# Patient Record
Sex: MALE | Race: WHITE | NOT HISPANIC OR LATINO | Employment: OTHER | URBAN - METROPOLITAN AREA
[De-identification: names, ages, dates, MRNs, and addresses within clinical notes are randomized per-mention and may not be internally consistent; named-entity substitution may affect disease eponyms.]

---

## 2017-04-05 ENCOUNTER — TRANSCRIBE ORDERS (OUTPATIENT)
Dept: ADMINISTRATIVE | Facility: HOSPITAL | Age: 62
End: 2017-04-05

## 2017-04-05 DIAGNOSIS — I61.9 BRAIN BLEED (HCC): Primary | ICD-10-CM

## 2017-04-17 ENCOUNTER — HOSPITAL ENCOUNTER (OUTPATIENT)
Dept: MRI IMAGING | Facility: HOSPITAL | Age: 62
Discharge: HOME | End: 2017-04-17
Payer: COMMERCIAL

## 2017-04-17 DIAGNOSIS — I61.9 BRAIN BLEED (HCC): ICD-10-CM

## 2017-04-17 PROCEDURE — 70553 MRI BRAIN STEM W/O & W/DYE: CPT

## 2017-04-17 PROCEDURE — A9585 GADOBUTROL INJECTION: HCPCS | Performed by: RADIOLOGY

## 2017-04-17 RX ADMIN — GADOBUTROL 10 ML: 604.72 INJECTION INTRAVENOUS at 12:32

## 2018-05-16 ENCOUNTER — TELEPHONE (OUTPATIENT)
Dept: UROLOGY | Facility: AMBULATORY SURGERY CENTER | Age: 63
End: 2018-05-16

## 2018-05-16 NOTE — TELEPHONE ENCOUNTER
Complaint/Diagnosis - ELEVATED PSA - ALREADY HAD PROSTATE BIOPSY IN 2017 FROM DR Peter Adhikari - I HAVE RESULTS IN HAND  Insurance- RUDY - PT CALLED ALREADY TO VERIFY WE ARE IN NETWORK  History of Cancer? NO  If yes, what kind? Previous urologist?YES  DR Toan Tong                  Records requested/where? RECORDS RECEIVED  Outside testing/where? PT IS OKAY WITH GOING TO [place] OFFICE AS WELL   - ONLY WANTS TO SEE Alexis Mcmillan

## 2018-06-04 ENCOUNTER — OFFICE VISIT (OUTPATIENT)
Dept: UROLOGY | Facility: HOSPITAL | Age: 63
End: 2018-06-04
Payer: COMMERCIAL

## 2018-06-04 VITALS — HEART RATE: 60 BPM | SYSTOLIC BLOOD PRESSURE: 110 MMHG | DIASTOLIC BLOOD PRESSURE: 84 MMHG

## 2018-06-04 DIAGNOSIS — R97.20 ELEVATED PSA: Primary | ICD-10-CM

## 2018-06-04 PROCEDURE — 99244 OFF/OP CNSLTJ NEW/EST MOD 40: CPT | Performed by: UROLOGY

## 2018-06-04 RX ORDER — CLONIDINE HYDROCHLORIDE 0.1 MG/1
0.1 TABLET ORAL 2 TIMES DAILY
Refills: 2 | COMMUNITY
Start: 2018-04-03

## 2018-06-04 RX ORDER — LORAZEPAM 0.5 MG/1
0.5 TABLET ORAL EVERY 8 HOURS
COMMUNITY

## 2018-06-04 RX ORDER — WARFARIN SODIUM 1 MG/1
5 TABLET ORAL 2 TIMES DAILY
Refills: 1 | COMMUNITY
Start: 2018-05-17 | End: 2020-01-30 | Stop reason: SDUPTHER

## 2018-06-04 RX ORDER — BIOTIN 1 MG
TABLET ORAL
COMMUNITY

## 2018-06-04 RX ORDER — WARFARIN SODIUM 5 MG/1
5 TABLET ORAL DAILY
Refills: 1 | COMMUNITY
Start: 2018-05-21

## 2018-06-04 RX ORDER — FLUOXETINE HYDROCHLORIDE 20 MG/1
CAPSULE ORAL
Refills: 2 | COMMUNITY
Start: 2018-03-15

## 2018-06-04 RX ORDER — LISINOPRIL 20 MG/1
20 TABLET ORAL DAILY
Refills: 1 | COMMUNITY
Start: 2018-05-21

## 2018-06-04 RX ORDER — GLIPIZIDE 2.5 MG/1
10 TABLET, EXTENDED RELEASE ORAL 2 TIMES DAILY
Refills: 3 | COMMUNITY
Start: 2018-05-12 | End: 2020-11-19 | Stop reason: DRUGHIGH

## 2018-06-04 RX ORDER — FENOFIBRATE 54 MG/1
160 TABLET ORAL DAILY
Refills: 1 | COMMUNITY
Start: 2018-05-21

## 2018-06-04 RX ORDER — THIAMINE MONONITRATE (VIT B1) 100 MG
TABLET ORAL
COMMUNITY

## 2018-06-04 RX ORDER — LEVETIRACETAM 1000 MG/1
TABLET ORAL
COMMUNITY
Start: 2018-02-15 | End: 2018-06-27 | Stop reason: SDUPTHER

## 2018-06-04 NOTE — PROGRESS NOTES
Assessment/Plan:    Elevated PSA  We discussed prostate cancer screening and PSA at depth  We discussed that the MDX confirm test he did showed up 47% chance of having Lexx 7 or greater cancer  We discussed options including continue to observe the PSA or repeating a prostate biopsy  After lengthy discussion the patient wishes to observe the PSA  We will repeat it in July  If the PSA is continuing to rise to greater than 10 we will proceed with biopsy  He will need to come off of Coumadin for the biopsy and we will need discuss this further with his primary doctor  Diagnoses and all orders for this visit:    Elevated PSA  -     PSA Total, Diagnostic; Future    Other orders  -     aspirin 81 MG tablet; Take 81 mg by mouth  -     cloNIDine (CATAPRES) 0 1 mg tablet; Take 0 1 mg by mouth 2 (two) times a day  -     fenofibrate (TRICOR) 54 MG tablet; Take 54 mg by mouth daily  -     FLUoxetine (PROzac) 20 mg capsule; TAKE 1 CAPSULE BY ORAL ROUTE EVERY DAY IN THE MORNING  -     glipiZIDE (GLUCOTROL XL) 2 5 mg 24 hr tablet; TAKE 1 TABLET BY ORAL ROUTE EVERY DAY WITH BREAKFAST  -     lisinopril (ZESTRIL) 20 mg tablet; Take 20 mg by mouth daily  -     levETIRAcetam (KEPPRA) 1000 MG tablet; by oral route take 0 5 tablet in am and 1 tablet in pm daily  -     LORazepam (ATIVAN) 0 5 mg tablet; Take 0 5 mg by mouth every 8 (eight) hours  -     metFORMIN (GLUCOPHAGE) 500 mg tablet; Take 500 mg by mouth  -     thiamine (VITAMIN B1) 100 mg tablet; i po daily  -     Cholecalciferol (VITAMIN D3) 1000 units CAPS; i po daily  -     warfarin (COUMADIN) 5 mg tablet; Take 5 mg by mouth daily  -     warfarin (COUMADIN) 1 mg tablet; Take 1 mg by mouth 2 (two) times a day          Total visit time was 60 minutes of which over 50% was spent on counseling  Subjective:     Patient ID: Tania Santiago is a 60 y o  male    24-year-old male presents for evaluation of elevated PSA    The patient has a history of elevated PSA and was previously seeing another urologist who has retired  He underwent prostate biopsy in March of 2016 when his PSA was over 4  This was an 8 core biopsy which was negative for cancer  The patient's PSA continued to climb  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  During aggressive anticoagulation of this clot he did suffer a hemorrhagic stroke  The patient is currently on Coumadin and aspirin  Since his PSA continued to climb he subsequently had a confrimMDX  test done on his previous biopsy from 2016  This demonstrated a 79% chance of prostate cancer on repeat biopsy, 47% chance of Lexx 7 or greater cancer  The patient subsequently underwent a prostate MRI that did not show any significant lesions  His repeat PSA done in April was over 9  The patient denies any new bone pain or gross hematuria  He has no other complaints  The following portions of the patient's history were reviewed and updated as appropriate: allergies, current medications, past family history, past medical history, past social history, past surgical history and problem list     Review of Systems   Constitutional: Negative  HENT: Negative  Eyes: Negative  Respiratory: Negative  Cardiovascular: Negative  Gastrointestinal: Negative  Endocrine: Negative  Genitourinary:        As noted per HPI   Musculoskeletal: Negative  Skin: Negative  Allergic/Immunologic: Negative  Neurological: Negative  Hematological: Negative  Psychiatric/Behavioral: Negative  Urinary Incontinence Screening      Most Recent Value   Urinary Incontinence   Urinary Incontinence? No   Incomplete emptying? No   Urinary frequency? No   Urinary urgency? No   Urinary hesitancy? No   Dysuria (painful difficult urination)? No   Nocturia (waking up to use the bathroom)?  -- [1-2 x]   Straining (having to push to go)?   No   Weak stream?  No   Intermittent stream?  No Post void dribbling? No          Objective:    Physical Exam   Constitutional: He is oriented to person, place, and time  He appears well-developed and well-nourished  Neck: Normal range of motion  Cardiovascular: Intact distal pulses  Pulmonary/Chest: Effort normal    Abdominal: Soft  Bowel sounds are normal  He exhibits no distension and no mass  There is no tenderness  There is no rebound and no guarding  Genitourinary: Rectum normal    Genitourinary Comments: 30 g, smooth, no nodules, nontender   Musculoskeletal: Normal range of motion  Neurological: He is alert and oriented to person, place, and time  Skin: Skin is warm and dry  Psychiatric: He has a normal mood and affect  Vitals reviewed          Results  No results found for: PSA  No results found for: GLUCOSE, CALCIUM, NA, K, CO2, CL, BUN, CREATININE  No results found for: WBC, HGB, HCT, MCV, PLT    No results found for this or any previous visit (from the past 1 hour(s)) ]

## 2018-06-04 NOTE — ASSESSMENT & PLAN NOTE
We discussed prostate cancer screening and PSA at depth  We discussed that the MDX confirm test he did showed up 47% chance of having [place] 7 or greater cancer  We discussed options including continue to observe the PSA or repeating a prostate biopsy  After lengthy discussion the patient wishes to observe the PSA  We will repeat it in July  If the PSA is continuing to rise to greater than 10 we will proceed with biopsy  He will need to come off of Coumadin for the biopsy and we will need discuss this further with his primary doctor

## 2018-06-04 NOTE — LETTER
June 4, 2018     Franchesca Law MD  [facility]  [address]     Patient: Robinson Pompa   YOB: 1955   Date of Visit: 6/4/2018       Dear Dr Dayanara Vasquez: Thank you for referring Robinson Pomap to me for evaluation  Below are my notes for this consultation  If you have questions, please do not hesitate to call me  I look forward to following your patient along with you  Sincerely,        Arturo Wright MD        CC: No Recipients  Arturo Wright MD  6/4/2018 12:34 PM  Sign at close encounter  Assessment/Plan:    No problem-specific Assessment & Plan notes found for this encounter  Diagnoses and all orders for this visit:    Elevated PSA  -     PSA Total, Diagnostic; Future    Other orders  -     aspirin 81 MG tablet; Take 81 mg by mouth  -     cloNIDine (CATAPRES) 0 1 mg tablet; Take 0 1 mg by mouth 2 (two) times a day  -     fenofibrate (TRICOR) 54 MG tablet; Take 54 mg by mouth daily  -     FLUoxetine (PROzac) 20 mg capsule; TAKE 1 CAPSULE BY ORAL ROUTE EVERY DAY IN THE MORNING  -     glipiZIDE (GLUCOTROL XL) 2 5 mg 24 hr tablet; TAKE 1 TABLET BY ORAL ROUTE EVERY DAY WITH BREAKFAST  -     lisinopril (ZESTRIL) 20 mg tablet; Take 20 mg by mouth daily  -     levETIRAcetam (KEPPRA) 1000 MG tablet; by oral route take 0 5 tablet in am and 1 tablet in pm daily  -     LORazepam (ATIVAN) 0 5 mg tablet; Take 0 5 mg by mouth every 8 (eight) hours  -     metFORMIN (GLUCOPHAGE) 500 mg tablet; Take 500 mg by mouth  -     thiamine (VITAMIN B1) 100 mg tablet; i po daily  -     Cholecalciferol (VITAMIN D3) 1000 units CAPS; i po daily  -     warfarin (COUMADIN) 5 mg tablet; Take 5 mg by mouth daily  -     warfarin (COUMADIN) 1 mg tablet; Take 1 mg by mouth 2 (two) times a day          Total visit time was 60 minutes of which over 50% was spent on counseling      Subjective:     Patient ID: Robinson Pompa is a 60 y o  male    59-year-old male presents for evaluation of elevated PSA  The patient has a history of elevated PSA and was previously seeing another urologist who has retired  He underwent prostate biopsy in March of 2016 when his PSA was over 4  This was an 8 core biopsy which was negative for cancer  The patient's PSA continued to climb  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  During aggressive anticoagulation of this clot he did suffer a hemorrhagic stroke  The patient is currently on Coumadin and aspirin  Since his PSA continued to climb he subsequently had a confrimMDX  test done on his previous biopsy from 2016  This demonstrated a 79% chance of prostate cancer on repeat biopsy, 47% chance of Lexx 7 or greater cancer  The patient subsequently underwent a prostate MRI that did not show any significant lesions  His repeat PSA done in April was over 9  The patient denies any new bone pain or gross hematuria  He has no other complaints  The following portions of the patient's history were reviewed and updated as appropriate: allergies, current medications, past family history, past medical history, past social history, past surgical history and problem list     Review of Systems   Constitutional: Negative  HENT: Negative  Eyes: Negative  Respiratory: Negative  Cardiovascular: Negative  Gastrointestinal: Negative  Endocrine: Negative  Genitourinary:        As noted per HPI   Musculoskeletal: Negative  Skin: Negative  Allergic/Immunologic: Negative  Neurological: Negative  Hematological: Negative  Psychiatric/Behavioral: Negative  Urinary Incontinence Screening      Most Recent Value   Urinary Incontinence   Urinary Incontinence? No   Incomplete emptying? No   Urinary frequency? No   Urinary urgency? No   Urinary hesitancy? No   Dysuria (painful difficult urination)?   No   Nocturia (waking up to use the bathroom)?  -- [1-2 x]   Straining (having to push to go)? No   Weak stream?  No   Intermittent stream?  No   Post void dribbling? No          Objective:    Physical Exam   Constitutional: He is oriented to person, place, and time  He appears well-developed and well-nourished  Neck: Normal range of motion  Cardiovascular: Intact distal pulses  Pulmonary/Chest: Effort normal    Abdominal: Soft  Bowel sounds are normal  He exhibits no distension and no mass  There is no tenderness  There is no rebound and no guarding  Genitourinary: Rectum normal    Genitourinary Comments: 30 g, smooth, no nodules, nontender   Musculoskeletal: Normal range of motion  Neurological: He is alert and oriented to person, place, and time  Skin: Skin is warm and dry  Psychiatric: He has a normal mood and affect  Vitals reviewed          Results  No results found for: PSA  No results found for: GLUCOSE, CALCIUM, NA, K, CO2, CL, BUN, CREATININE  No results found for: WBC, HGB, HCT, MCV, PLT    No results found for this or any previous visit (from the past 1 hour(s)) ]

## 2018-06-27 ENCOUNTER — TELEPHONE (OUTPATIENT)
Dept: NEUROLOGY | Facility: CLINIC | Age: 63
End: 2018-06-27

## 2018-06-27 DIAGNOSIS — G40.009 EPILEPSY WITH SEIZURES OF LOCALIZED ONSET (HCC): Primary | ICD-10-CM

## 2018-06-27 RX ORDER — LEVETIRACETAM 1000 MG/1
TABLET ORAL
Qty: 45 TABLET | Refills: 5 | Status: SHIPPED | OUTPATIENT
Start: 2018-06-27 | End: 2018-07-12 | Stop reason: SDUPTHER

## 2018-07-12 ENCOUNTER — OFFICE VISIT (OUTPATIENT)
Dept: NEUROLOGY | Facility: CLINIC | Age: 63
End: 2018-07-12
Payer: COMMERCIAL

## 2018-07-12 VITALS
WEIGHT: 228.3 LBS | SYSTOLIC BLOOD PRESSURE: 100 MMHG | HEART RATE: 71 BPM | HEIGHT: 71 IN | BODY MASS INDEX: 31.96 KG/M2 | DIASTOLIC BLOOD PRESSURE: 70 MMHG | RESPIRATION RATE: 18 BRPM

## 2018-07-12 DIAGNOSIS — G40.009 EPILEPSY WITH SEIZURES OF LOCALIZED ONSET (HCC): ICD-10-CM

## 2018-07-12 DIAGNOSIS — G40.009 LOCALIZATION-RELATED (FOCAL) (PARTIAL) IDIOPATHIC EPILEPSY AND EPILEPTIC SYNDROMES WITH SEIZURES OF LOCALIZED ONSET, NOT INTRACTABLE, WITHOUT STATUS EPILEPTICUS (HCC): Primary | ICD-10-CM

## 2018-07-12 PROBLEM — Z86.79 HISTORY OF CEREBRAL HEMORRHAGE: Status: ACTIVE | Noted: 2018-07-12

## 2018-07-12 PROCEDURE — 99213 OFFICE O/P EST LOW 20 MIN: CPT | Performed by: PSYCHIATRY & NEUROLOGY

## 2018-07-12 RX ORDER — LEVETIRACETAM 1000 MG/1
TABLET ORAL
Qty: 45 TABLET | Refills: 0 | Status: CANCELLED | OUTPATIENT
Start: 2018-07-12

## 2018-07-12 RX ORDER — LEVETIRACETAM 1000 MG/1
TABLET ORAL
Qty: 45 TABLET | Refills: 5 | Status: SHIPPED | OUTPATIENT
Start: 2018-07-12 | End: 2019-01-24 | Stop reason: SDUPTHER

## 2018-07-12 RX ORDER — LEVETIRACETAM 1000 MG/1
TABLET ORAL
Qty: 45 TABLET | Refills: 5 | Status: SHIPPED | OUTPATIENT
Start: 2018-07-12 | End: 2019-08-22 | Stop reason: SDUPTHER

## 2018-07-12 NOTE — LETTER
July 12, 2018     Blanca Del Real MD  [facility]  [address] [place] [address]     Patient: Cullen Hawkins   YOB: 1955   Date of Visit: 7/12/2018       Dear Dr Tavarez Ada: Thank you for referring Cullen Hawkins to me for evaluation  Below are my notes for this consultation  If you have questions, please do not hesitate to call me  I look forward to following your patient along with you  Sincerely,        Luis Cuellar MD        CC: No Recipients  Luis Cuellar MD  7/12/2018  9:59 AM  Sign at close encounter  Patient is here today for a follow up for his stroke    Patient ID: Cullen Hawkins is a 60 y o  male  Assessment/Plan:    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 localized onset, not intractable, without status epilepticus ([facility] )  Continues to do extremely well with no evident recurrences since his last recorded event on July 2, 2017  Patient related a question with regard to the possibility of reducing or discontinuing his AED  I discussed with him and his wife the fact that in my opinion it is too early to consider doing so given his underlying remote substrate (spontaneous right hemisphere hemorrhage in December 2016) and the fact that it is only been 1 year that he has been free of events  Both agree to the appropriateness of continuing   --continue Keppra 500 mg in a m  and a 1000 mg in p m  daily  --he states that he has upcoming blood work to be performed  I have asked that blood work included CBC, CMP and Keppra level  He will follow up in 6 months  Subjective:    HPI  Patient, now 60years of age, presents with his wife  He continues is follow-up here that given his historical seizure disorder, characterized as partial in onset and referable to his past spontaneous right hemisphere intracerebral hemorrhage  That hemorrhage occurred in December of 2016    He has continued for seizure control on Keppra 500 mg in a m  and 1000 mg in p m  daily  He has had no reported further events since that of July 2, 2017  He expresses no symptoms that would suggest any problems with adverse side effects from his Keppra      Past Medical History:   Diagnosis Date    Diabetes ([facility] )     DVT (deep venous thrombosis) ([place] [address] ) 2016    Dyslipidemia     Factor V deficiency ([place] [address] )     High blood pressure     Lobar cerebral hemorrhage (HCC)     Multiple pulmonary emboli (HCC)     Seizures ([place] [address] ) 07/2017    Stroke ([place] [address] ) 12/14/2016     Past Surgical History:   Procedure Laterality Date    IVC FILTER INSERTION      IVC umbrella     Social History     Social History    Marital status: /Civil Union     Spouse name: N/A    Number of children: N/A    Years of education: N/A     Social History Main Topics    Smoking status: Former Smoker     Quit date: 1989    Smokeless tobacco: Never Used    Alcohol use No      Comment: not for 1/1/2 years    Drug use: No    Sexual activity: Not Asked     Other Topics Concern    None     Social History Narrative    None     Family History   Problem Relation Age of Onset    Kidney disease Father     Hypertension Father     Diabetes Father     Hypertension Mother     Hypertension Brother      Allergies   Allergen Reactions    Penicillins Hives     hives, swelling- from youth       Current Outpatient Prescriptions:     aspirin 81 MG tablet, Take 81 mg by mouth, Disp: , Rfl:     Cholecalciferol (VITAMIN D3) 1000 units CAPS, i po daily, Disp: , Rfl:     cloNIDine (CATAPRES) 0 1 mg tablet, Take 0 1 mg by mouth 2 (two) times a day, Disp: , Rfl: 2    fenofibrate (TRICOR) 54 MG tablet, Take 54 mg by mouth daily, Disp: , Rfl: 1    FLUoxetine (PROzac) 20 mg capsule, TAKE 1 CAPSULE BY ORAL ROUTE EVERY DAY IN THE MORNING, Disp: , Rfl: 2    glipiZIDE (GLUCOTROL XL) 2 5 mg 24 hr tablet, TAKE 1 TABLET BY ORAL ROUTE EVERY DAY WITH BREAKFAST, Disp: , Rfl: 3   lisinopril (ZESTRIL) 20 mg tablet, Take 20 mg by mouth daily, Disp: , Rfl: 1    LORazepam (ATIVAN) 0 5 mg tablet, Take 0 5 mg by mouth every 8 (eight) hours, Disp: , Rfl:     metFORMIN (GLUCOPHAGE) 500 mg tablet, Take 500 mg by mouth 2 (two) times a day  , Disp: , Rfl:     thiamine (VITAMIN B1) 100 mg tablet, i po daily, Disp: , Rfl:     warfarin (COUMADIN) 1 mg tablet, Take 1 mg by mouth 2 (two) times a day, Disp: , Rfl: 1    warfarin (COUMADIN) 5 mg tablet, Take 5 mg by mouth daily, Disp: , Rfl: 1    levETIRAcetam (KEPPRA) 1000 MG tablet, By oral route 0 5 tablet in the a m  and 1 tablets in p m  daily, Disp: 45 tablet, Rfl: 5    levETIRAcetam (KEPPRA) 1000 MG tablet, The oral route take 0 5 tablet at a m  and 1 tablet in p m  daily, Disp: 45 tablet, Rfl: 5    Objective:    Blood pressure 100/70, pulse 71, resp  rate 18, height 5' 11" (1 803 m), weight 104 kg (228 lb 4 8 oz)  Physical Exam  Lungs clear to auscultation  Rhythm regular  Neurological Exam  Alert  Pleasantly interactive  Fully oriented  No dysarthria  Unremarkable spontaneous gait  Cranial nerves 2-12 tested and grossly intact except for a facial asymmetry with slight droop 1 right at rest but with slight droop on the left with smile along with his dense confrontational left homonymous hemianopsia, all of which is unchanged from the past   No lateralized extremity weakness with gross motor testing  Muscle stretch reflexes slightly increased on left as compared to right, also unchanged  ROS:    Review of Systems   Constitutional: Positive for fatigue  Negative for appetite change and fever  HENT: Negative  Negative for hearing loss, tinnitus, trouble swallowing and voice change  Eyes: Negative  Negative for photophobia and pain  Respiratory: Negative  Negative for shortness of breath  Cardiovascular: Negative  Negative for palpitations  Gastrointestinal: Negative  Negative for nausea and vomiting     Endocrine: Negative  Negative for cold intolerance and heat intolerance  Genitourinary: Negative  Negative for dysuria, frequency and urgency  Musculoskeletal: Negative  Negative for myalgias and neck pain  Skin: Negative  Negative for rash  Allergic/Immunologic: Negative  Neurological: Negative  Negative for dizziness, tremors, seizures, syncope, facial asymmetry, speech difficulty, weakness, light-headedness, numbness and headaches  Hematological: Negative  Does not bruise/bleed easily  Psychiatric/Behavioral: Negative  Negative for confusion, hallucinations and sleep disturbance  I personally reviewed the ROS that was entered by the medical assistant

## 2018-07-12 NOTE — ASSESSMENT & PLAN NOTE
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

## 2018-07-12 NOTE — PROGRESS NOTES
Patient is here today for a follow up for his stroke    Patient ID: Shirlene Olszewski is a 60 y o  male  Assessment/Plan:    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 localized onset, not intractable, without status epilepticus ([facility] [address] )  Continues to do extremely well with no evident recurrences since his last recorded event on July 2, 2017  Patient related a question with regard to the possibility of reducing or discontinuing his AED  I discussed with him and his wife the fact that in my opinion it is too early to consider doing so given his underlying remote substrate (spontaneous right hemisphere hemorrhage in December 2016) and the fact that it is only been 1 year that he has been free of events  Both agree to the appropriateness of continuing   --continue Keppra 500 mg in a m  and a 1000 mg in p m  daily  --he states that he has upcoming blood work to be performed  I have asked that blood work included CBC, CMP and Keppra level  He will follow up in 6 months  Subjective:    HPI  Patient, now 60years of age, presents with his wife  He continues is follow-up here that given his historical seizure disorder, characterized as partial in onset and referable to his past spontaneous right hemisphere intracerebral hemorrhage  That hemorrhage occurred in December of 2016  He has continued for seizure control on Keppra 500 mg in a m  and 1000 mg in p m  daily  He has had no reported further events since that of July 2, 2017  He expresses no symptoms that would suggest any problems with adverse side effects from his Keppra      Past Medical History:   Diagnosis Date    Diabetes ([facility] [address] )     DVT (deep venous thrombosis) ([facility] [address] ) 2016    Dyslipidemia     Factor V deficiency ([facility] [address] )     High blood pressure     Lobar cerebral hemorrhage (HCC)     Multiple pulmonary emboli (HCC)     Seizures ([facility] [address] ) 07/2017    [facility] 12[facility]14[facility]2016     Past Surgical History:   Procedure Laterality Date    IVC FILTER INSERTION      IVC umbrella     Social History     Social History    Marital status: /Civil Union     Spouse name: N/A    Number of children: N/A    Years of education: N/A     Social History Main Topics    Smoking status: Former Smoker     Quit date: 1989    Smokeless tobacco: Never Used    Alcohol use No      Comment: not for 1/1/2 years    Drug use: No    Sexual activity: Not Asked     Other Topics Concern    None     Social History Narrative    None     Family History   Problem Relation Age of Onset    Kidney disease Father     Hypertension Father     Diabetes Father     Hypertension Mother     Hypertension Brother      Allergies   Allergen Reactions    Penicillins Hives     hives, swelling- from youth       Current Outpatient Prescriptions:     aspirin 81 MG tablet, Take 81 mg by mouth, Disp: , Rfl:     Cholecalciferol (VITAMIN D3) 1000 units CAPS, i po daily, Disp: , Rfl:     cloNIDine (CATAPRES) 0 1 mg tablet, Take 0 1 mg by mouth 2 (two) times a day, Disp: , Rfl: 2    fenofibrate (TRICOR) 54 MG tablet, Take 54 mg by mouth daily, Disp: , Rfl: 1    FLUoxetine (PROzac) 20 mg capsule, TAKE 1 CAPSULE BY ORAL ROUTE EVERY DAY IN THE MORNING, Disp: , Rfl: 2    glipiZIDE (GLUCOTROL XL) 2 5 mg 24 hr tablet, TAKE 1 TABLET BY ORAL ROUTE EVERY DAY WITH BREAKFAST, Disp: , Rfl: 3    lisinopril (ZESTRIL) 20 mg tablet, Take 20 mg by mouth daily, Disp: , Rfl: 1    LORazepam (ATIVAN) 0 5 mg tablet, Take 0 5 mg by mouth every 8 (eight) hours, Disp: , Rfl:     metFORMIN (GLUCOPHAGE) 500 mg tablet, Take 500 mg by mouth 2 (two) times a day  , Disp: , Rfl:     thiamine (VITAMIN B1) 100 mg tablet, i po daily, Disp: , Rfl:     warfarin (COUMADIN) 1 mg tablet, Take 1 mg by mouth 2 (two) times a day, Disp: , Rfl: 1    warfarin (COUMADIN) 5 mg tablet, Take 5 mg by mouth daily, Disp: , Rfl: 1    levETIRAcetam (KEPPRA) 1000 MG tablet, By oral route 0 5 tablet in the a m  and 1 tablets in p m  daily, Disp: 45 tablet, Rfl: 5    levETIRAcetam (KEPPRA) 1000 MG tablet, The oral route take 0 5 tablet at a m  and 1 tablet in p m  daily, Disp: 45 tablet, Rfl: 5    Objective:    Blood pressure 100/70, pulse 71, resp  rate 18, height 5' 11" (1 803 m), weight 104 kg (228 lb 4 8 oz)  Physical Exam  Lungs clear to auscultation  Rhythm regular  Neurological Exam  Alert  Pleasantly interactive  Fully oriented  No dysarthria  Unremarkable spontaneous gait  Cranial nerves 2-12 tested and grossly intact except for a facial asymmetry with slight droop 1 right at rest but with slight droop on the left with smile along with his dense confrontational left homonymous hemianopsia, all of which is unchanged from the past   No lateralized extremity weakness with gross motor testing  Muscle stretch reflexes slightly increased on left as compared to right, also unchanged  ROS:    Review of Systems   Constitutional: Positive for fatigue  Negative for appetite change and fever  HENT: Negative  Negative for hearing loss, tinnitus, trouble swallowing and voice change  Eyes: Negative  Negative for photophobia and pain  Respiratory: Negative  Negative for shortness of breath  Cardiovascular: Negative  Negative for palpitations  Gastrointestinal: Negative  Negative for nausea and vomiting  Endocrine: Negative  Negative for cold intolerance and heat intolerance  Genitourinary: Negative  Negative for dysuria, frequency and urgency  Musculoskeletal: Negative  Negative for myalgias and neck pain  Skin: Negative  Negative for rash  Allergic/Immunologic: Negative  Neurological: Negative  Negative for dizziness, tremors, seizures, syncope, facial asymmetry, speech difficulty, weakness, light-headedness, numbness and headaches  Hematological: Negative  Does not bruise/bleed easily  Psychiatric/Behavioral: Negative    Negative for confusion, hallucinations and sleep disturbance  I personally reviewed the ROS that was entered by the medical assistant

## 2018-07-23 ENCOUNTER — OFFICE VISIT (OUTPATIENT)
Dept: UROLOGY | Facility: HOSPITAL | Age: 63
End: 2018-07-23
Payer: COMMERCIAL

## 2018-07-23 ENCOUNTER — TELEPHONE (OUTPATIENT)
Dept: UROLOGY | Facility: HOSPITAL | Age: 63
End: 2018-07-23

## 2018-07-23 VITALS
HEART RATE: 70 BPM | BODY MASS INDEX: 32.9 KG/M2 | HEIGHT: 71 IN | DIASTOLIC BLOOD PRESSURE: 70 MMHG | SYSTOLIC BLOOD PRESSURE: 120 MMHG | WEIGHT: 235 LBS

## 2018-07-23 DIAGNOSIS — R97.20 ELEVATED PSA: Primary | ICD-10-CM

## 2018-07-23 PROCEDURE — 99213 OFFICE O/P EST LOW 20 MIN: CPT | Performed by: UROLOGY

## 2018-07-23 RX ORDER — CIPROFLOXACIN 500 MG/1
500 TABLET, FILM COATED ORAL 2 TIMES DAILY
Qty: 6 TABLET | Refills: 0 | Status: SHIPPED | OUTPATIENT
Start: 2018-07-23 | End: 2018-07-26

## 2018-07-23 NOTE — TELEPHONE ENCOUNTER
Called and left message at Dr Ingrid Longo office to see if Sandee James could stop coumadin prior to prostate biopsy on 9/6/8 or if he needed bridging orders

## 2018-07-23 NOTE — ASSESSMENT & PLAN NOTE
We reviewed his PSA results  They have climb to over 10  We discussed options including continued observation of the PSA or proceeding with biopsy  The patient wishes to proceed with biopsy and I think this is very reasonable  Risks and benefits were discussed  Will contact his primary doctor regarding his Coumadin  We will schedule this in the near future

## 2018-07-23 NOTE — PROGRESS NOTES
Assessment/Plan:    Elevated PSA  We reviewed his PSA results  They have climb to over 10  We discussed options including continued observation of the PSA or proceeding with biopsy  The patient wishes to proceed with biopsy and I think this is very reasonable  Risks and benefits were discussed  Will contact his primary doctor regarding his Coumadin  We will schedule this in the near future  Diagnoses and all orders for this visit:    Elevated PSA  -     ciprofloxacin (CIPRO) 500 mg tablet; Take 1 tablet (500 mg total) by mouth 2 (two) times a day for 3 days Start medicine the day before planned procedure  -     bisacodyl (DULCOLAX) 5 mg EC tablet; Take 2 tablets (10 mg total) by mouth once for 1 dose Take the day before procedure  Total visit time was 25 minutes of which over 50% was spent on counseling  Subjective:     Patient ID: Maya No is a 60 y o  male    79-year-old male with history of elevated PSA presents for follow-up  He denies any changes to urination or new gross hematuria  He has no new complaints  He is here to review his new his PSA result  The following portions of the patient's history were reviewed and updated as appropriate: allergies, current medications, past family history, past medical history, past social history, past surgical history and problem list     Review of Systems   Constitutional: Negative  HENT: Negative  Eyes: Negative  Respiratory: Negative  Cardiovascular: Negative  Gastrointestinal: Negative  Endocrine: Negative  Genitourinary:        As noted per HPI   Musculoskeletal: Negative  Skin: Negative  Allergic/Immunologic: Negative  Neurological: Negative  Hematological: Negative  Psychiatric/Behavioral: Negative  AUA SYMPTOM SCORE      Most Recent Value   AUA SYMPTOM SCORE   How often have you had a sensation of not emptying your bladder completely after you finished urinating?   1   How often have you had to urinate again less than two hours after you finished urinating? 0   How often have you found you stopped and started again several times when you urinate? 2   How often have you found it difficult to postpone urination? 0   How often have you had a weak urinary stream?  1   How often have you had to push or strain to begin urination? 1   How many times did you most typically get up to urinate from the time you went to bed at night until the time you got up in the morning? 2   Quality of Life: If you were to spend the rest of your life with your urinary condition just the way it is now, how would you feel about that?  1   AUA SYMPTOM SCORE  7          Urinary Incontinence Screening      Most Recent Value   Urinary Incontinence   Urinary Incontinence? No   Incomplete emptying? No   Urinary frequency? No   Urinary urgency? No   Urinary hesitancy? No   Dysuria (painful difficult urination)? No   Nocturia (waking up to use the bathroom)? Yes [2 times]   Straining (having to push to go)? No   Weak stream?  No   Intermittent stream?  No   Post void dribbling? Yes          Objective:    Physical Exam   Constitutional: He is oriented to person, place, and time  He appears well-developed and well-nourished  Neck: Normal range of motion  Cardiovascular: Intact distal pulses  Pulmonary/Chest: Effort normal    Abdominal: Soft  Bowel sounds are normal  He exhibits no distension and no mass  There is no tenderness  There is no rebound and no guarding  Musculoskeletal: Normal range of motion  Neurological: He is alert and oriented to person, place, and time  Skin: Skin is warm and dry  Psychiatric: He has a normal mood and affect  Vitals reviewed          Results  No results found for: PSA  No results found for: GLUCOSE, CALCIUM, NA, K, CO2, CL, BUN, CREATININE  No results found for: WBC, HGB, HCT, MCV, PLT    No results found for this or any previous visit (from the past 1 hour(s)) ]

## 2018-08-24 ENCOUNTER — TELEPHONE (OUTPATIENT)
Dept: UROLOGY | Facility: HOSPITAL | Age: 63
End: 2018-08-24

## 2018-08-24 NOTE — TELEPHONE ENCOUNTER
Jose Villeda called about the lovenox shots that Alysa Calderon needs prior to his prostate biopsy    I explaind a little to her but I told her it would be better to talk with the PCP since they were the ones to prescribe it

## 2018-09-06 ENCOUNTER — PROCEDURE VISIT (OUTPATIENT)
Dept: UROLOGY | Facility: AMBULATORY SURGERY CENTER | Age: 63
End: 2018-09-06
Payer: COMMERCIAL

## 2018-09-06 VITALS
HEART RATE: 84 BPM | SYSTOLIC BLOOD PRESSURE: 140 MMHG | BODY MASS INDEX: 32.93 KG/M2 | HEIGHT: 71 IN | WEIGHT: 235.2 LBS | DIASTOLIC BLOOD PRESSURE: 84 MMHG

## 2018-09-06 DIAGNOSIS — R97.20 ELEVATED PROSTATE SPECIFIC ANTIGEN (PSA): ICD-10-CM

## 2018-09-06 DIAGNOSIS — C61 MALIGNANT NEOPLASM OF PROSTATE (HCC): Primary | ICD-10-CM

## 2018-09-06 DIAGNOSIS — R97.20 ELEVATED PSA: Primary | ICD-10-CM

## 2018-09-06 PROCEDURE — 88344 IMHCHEM/IMCYTCHM EA MLT ANTB: CPT | Performed by: PATHOLOGY

## 2018-09-06 PROCEDURE — 76872 US TRANSRECTAL: CPT | Performed by: UROLOGY

## 2018-09-06 PROCEDURE — 76942 ECHO GUIDE FOR BIOPSY: CPT | Performed by: UROLOGY

## 2018-09-06 PROCEDURE — G0416 PROSTATE BIOPSY, ANY MTHD: HCPCS | Performed by: PATHOLOGY

## 2018-09-06 PROCEDURE — 55700 PR BIOPSY OF PROSTATE,NEEDLE/PUNCH: CPT | Performed by: UROLOGY

## 2018-09-06 NOTE — PROGRESS NOTES
Biopsy prostate     Date/Time 9/6/2018 11:14 AM     Performed by  Jean Marie Polanco     Authorized by Jean Marie Polanco          Office TRUS-guided Prostate Biopsy Procedure Note    Indication    Elevated PSA    Informed consent   The risks, benefits and alternatives to TRUS-guided prostate biopsy were conveyed to the patient prior to performing the procedure  A discussion of the risks of the procedure included, but was not limited to: pain, hematuria, hematochezia, hematospermia, infection, and the possibility of a non-diagnostic biopsy  The patient was given the opportunity to have his questions answered and there was no perceived barrier to education  Prophylaxis   The patient underwent bowel prep and startedCiprofloxacin yesterday and will continue to complete antibiotic course through tomorrow  Local anesthesia  Topical 2% lidocaine jelly was applied liberally to the anus and rectum and allowed to dwell for at least 5 min prior to starting the procedure  After insertion of the TRUS probe, 20 mL of 1% lidocaine solution was injected with ultrasound guidance at the prostatic apex and at the junction of the prostate and seminal vesicles  The anesthetic was allowed to dwell for at least 2 minutes prior to biopsy  Transrectal ultrasonography  The patient was placed in the left lateral decubitus position  After an attentive digital rectal examination, a 7 5 mHz sidefire ultrasound probe was gently inserted into the rectum and biplanar imaging of the prostate was done with the findings noted below  Images were taken of any abnormal findings and also to document prostate size  Ultrasound Findings    The seminal vesicles were normal in size, shape, and echotexture  No mass or cyst was identified  The prostate had heterogeneous echogenicity with no discrete masses  Prostate volume was calculated as 28 cm3      TRUS-guided needle biopsy  Using an 18 gauge biopsy needle and ultrasound guidance, the following biopsies were taken:    1 core(s) from the left lateral base  1 core(s) from the left lateral mid-gland  1 core(s) from the right middle base  1 core(s) from the right lateral base  1 core(s) from the left lateral mid-gland  1 core(s) from the left middle mid-gland  1 core(s) from the right middle mid-gland  1 core(s) from the right lateral mid-gland  1 core(s) from the left lateral apex  1 core(s) from the left middle apex  1 core(s) from the right middle apex  1 core(s) from the right lateral apex    Total number of cores: 12                Complications  There were no procedural complications  Disposition  The patient was dismissed to home after 30 minutes of observation in stable condition  Post-procedure instructions: Today he underwent an uncomplicated transrectal ultrasound-guided biopsy of the prostate, following a periprosthetic nerve block  I reviewed the normal postprocedure a course including bleeding per recutm, hematuria, and hematospermia  I instructed him to complete his course of antibiotics as prescribed  Instructed him to call with fever greater than 101, chills, nausea, vomiting, poorly controlled pain  His followup was scheduled in approximately 2 weeks' time to review the pathology

## 2018-09-07 ENCOUNTER — TELEPHONE (OUTPATIENT)
Dept: UROLOGY | Facility: HOSPITAL | Age: 63
End: 2018-09-07

## 2018-09-07 NOTE — TELEPHONE ENCOUNTER
Patient called stating he was doing well after Prostate BX but asked for a call back  He said he is doing well, no fever or blood in urine    Asked if he could go for walks and told him he was able

## 2018-09-11 ENCOUNTER — TELEPHONE (OUTPATIENT)
Dept: UROLOGY | Facility: AMBULATORY SURGERY CENTER | Age: 63
End: 2018-09-11

## 2018-09-11 NOTE — TELEPHONE ENCOUNTER
Oralee Dress from Pathology called  Patient's results for biopsy are in  He wanted to let Dr Lew Shelby know

## 2018-09-13 DIAGNOSIS — R56.9 PARTIAL SEIZURES (HCC): Primary | ICD-10-CM

## 2018-09-13 RX ORDER — LEVETIRACETAM 1000 MG/1
TABLET ORAL
Qty: 135 TABLET | Refills: 1 | Status: SHIPPED | OUTPATIENT
Start: 2018-09-13 | End: 2018-10-25 | Stop reason: ALTCHOICE

## 2018-09-17 ENCOUNTER — TELEPHONE (OUTPATIENT)
Dept: UROLOGY | Facility: HOSPITAL | Age: 63
End: 2018-09-17

## 2018-09-17 NOTE — TELEPHONE ENCOUNTER
Patient called for results of his prostate biopsy  He has an appt next Monday and doesn't want to wait  Explained that we don't give results over the phone, but I will discuss with Dr Ivett Menendez  Called Mirna Hopkins back after discussing with Kim Cr that is not something that the office does of the phone that results are discussed in person    He said alright will see Dr Ivett Menendez next week

## 2018-09-24 ENCOUNTER — OFFICE VISIT (OUTPATIENT)
Dept: UROLOGY | Facility: HOSPITAL | Age: 63
End: 2018-09-24
Payer: COMMERCIAL

## 2018-09-24 VITALS
SYSTOLIC BLOOD PRESSURE: 145 MMHG | BODY MASS INDEX: 32.48 KG/M2 | WEIGHT: 232 LBS | DIASTOLIC BLOOD PRESSURE: 88 MMHG | HEART RATE: 62 BPM | HEIGHT: 71 IN

## 2018-09-24 DIAGNOSIS — C61 PROSTATE CANCER (HCC): Primary | ICD-10-CM

## 2018-09-24 PROCEDURE — 99215 OFFICE O/P EST HI 40 MIN: CPT | Performed by: UROLOGY

## 2018-09-24 NOTE — ASSESSMENT & PLAN NOTE
The patient was recently discovered to have clinical stage T1c prostate cancer  His PSA was 10 1  His biopsy shows right [place] Score 3+4= 7 disease  Metastatic work up was not done due to low risk  I had a lengthy discussion with him about the implications of clinically localized prostate cancer and the treatment options of observation, hormonal therapy, radiation therapy, radical prostatectomy and ablative therapy  I did the best I could to describe the relative pros and cons of each approach  With regard to surgery, the nature of the operation, the risks of infection, hemorrhage, impotence, incontinence, thromboembolic disease and other surgical complications were reviewed with him in detail  We talked about open and robotic approaches  With regard to radiation, differences between external beam, interstitial therapy and combined forms of radiation were reviewed  I have spent a total time of 40 minutes with the patient taking a history, doing an exam and treatment planning, 30 minutes of this time was spent in discussing his diagnosis of prostate cancer  After our lengthy discussion the following decision was made: We will proceed with molecular testing to see whether his cancer is more consistent with intermediate risk or low risk disease  If he has low risk disease we would opt for active surveillance  If his disease looks more aggressive we will need to further discuss both surgery and radiation  We discussed that he is at higher risk for surgical complication given his medical comorbidities

## 2018-09-24 NOTE — LETTER
September 24, 2018     Gabby Falcon MD  [facility] [address]  [address] [place] [address] Dr    Patient: Jordan De Jesus   YOB: 1955   Date of Visit: 9/24/2018       Dear Dr Sin Salazar: Thank you for referring Jordan De Jesus to me for evaluation  Below are my notes for this consultation  If you have questions, please do not hesitate to call me  I look forward to following your patient along with you  Sincerely,        Aiyana Ahn MD        CC: No Recipients  Aiyana Ahn MD  9/24/2018 11:57 AM  Sign at close encounter  Assessment/Plan:    Prostate cancer [facility])  The patient was recently discovered to have clinical stage T1c prostate cancer  His PSA was 10 1  His biopsy shows right [place] Score 3+4= 7 disease  Metastatic work up was not done due to low risk  I had a lengthy discussion with him about the implications of clinically localized prostate cancer and the treatment options of observation, hormonal therapy, radiation therapy, radical prostatectomy and ablative therapy  I did the best I could to describe the relative pros and cons of each approach  With regard to surgery, the nature of the operation, the risks of infection, hemorrhage, impotence, incontinence, thromboembolic disease and other surgical complications were reviewed with him in detail  We talked about open and robotic approaches  With regard to radiation, differences between external beam, interstitial therapy and combined forms of radiation were reviewed  I have spent a total time of 40 minutes with the patient taking a history, doing an exam and treatment planning, 30 minutes of this time was spent in discussing his diagnosis of prostate cancer  After our lengthy discussion the following decision was made: We will proceed with molecular testing to see whether his cancer is more consistent with intermediate risk or low risk disease    If he has low risk disease we would opt for active surveillance  If his disease looks more aggressive we will need to further discuss both surgery and radiation  We discussed that he is at higher risk for surgical complication given his medical comorbidities  Diagnoses and all orders for this visit:    Prostate cancer ([facility] [address] )          Total visit time was 40 minutes of which over 50% was spent on counseling  Subjective:     Patient ID: Nando Roberson is a 58 y o  male    39-year-old male presents for follow-up after prostate biopsy  He did well after the procedure and has no new complaints  He is here with his wife to discuss the results  The following portions of the patient's history were reviewed and updated as appropriate: allergies, current medications, past family history, past medical history, past social history, past surgical history and problem list     Review of Systems   Constitutional: Negative  HENT: Negative  Eyes: Negative  Respiratory: Negative  Cardiovascular: Negative  Gastrointestinal: Negative  Endocrine: Negative  Genitourinary:        As noted per HPI   Musculoskeletal: Negative  Skin: Negative  Allergic/Immunologic: Negative  Neurological: Negative  Hematological: Negative  Psychiatric/Behavioral: Negative  Objective:    Physical Exam   Constitutional: He is oriented to person, place, and time  He appears well-developed and well-nourished  Neck: Normal range of motion  Cardiovascular: Intact distal pulses  Pulmonary/Chest: Effort normal    Abdominal: Soft  Bowel sounds are normal  He exhibits no distension and no mass  There is no tenderness  There is no rebound and no guarding  Musculoskeletal: Normal range of motion  Neurological: He is alert and oriented to person, place, and time  Skin: Skin is warm and dry  Psychiatric: He has a normal mood and affect  Vitals reviewed          Results  No results found for: PSA  No results found for: GLUCOSE, CALCIUM, NA, K, CO2, CL, BUN, CREATININE  No results found for: WBC, HGB, HCT, MCV, PLT    No results found for this or any previous visit (from the past 1 hour(s)) ]

## 2018-09-24 NOTE — PROGRESS NOTES
Assessment/Plan:    Prostate cancer [facility])  The patient was recently discovered to have clinical stage T1c prostate cancer  His PSA was 10 1  His biopsy shows right [place] Score 3+4= 7 disease  Metastatic work up was not done due to low risk  I had a lengthy discussion with him about the implications of clinically localized prostate cancer and the treatment options of observation, hormonal therapy, radiation therapy, radical prostatectomy and ablative therapy  I did the best I could to describe the relative pros and cons of each approach  With regard to surgery, the nature of the operation, the risks of infection, hemorrhage, impotence, incontinence, thromboembolic disease and other surgical complications were reviewed with him in detail  We talked about open and robotic approaches  With regard to radiation, differences between external beam, interstitial therapy and combined forms of radiation were reviewed  I have spent a total time of 40 minutes with the patient taking a history, doing an exam and treatment planning, 30 minutes of this time was spent in discussing his diagnosis of prostate cancer  After our lengthy discussion the following decision was made: We will proceed with molecular testing to see whether his cancer is more consistent with intermediate risk or low risk disease  If he has low risk disease we would opt for active surveillance  If his disease looks more aggressive we will need to further discuss both surgery and radiation  We discussed that he is at higher risk for surgical complication given his medical comorbidities  Diagnoses and all orders for this visit:    Prostate cancer ([facility] [address] )          Total visit time was 40 minutes of which over 50% was spent on counseling  Subjective:     Patient ID: Cullen Hawkins is a 58 y o  male    69-year-old male presents for follow-up after prostate biopsy  He did well after the procedure and has no new complaints    He is here with his wife to discuss the results  The following portions of the patient's history were reviewed and updated as appropriate: allergies, current medications, past family history, past medical history, past social history, past surgical history and problem list     Review of Systems   Constitutional: Negative  HENT: Negative  Eyes: Negative  Respiratory: Negative  Cardiovascular: Negative  Gastrointestinal: Negative  Endocrine: Negative  Genitourinary:        As noted per HPI   Musculoskeletal: Negative  Skin: Negative  Allergic/Immunologic: Negative  Neurological: Negative  Hematological: Negative  Psychiatric/Behavioral: Negative  Objective:    Physical Exam   Constitutional: He is oriented to person, place, and time  He appears well-developed and well-nourished  Neck: Normal range of motion  Cardiovascular: Intact distal pulses  Pulmonary/Chest: Effort normal    Abdominal: Soft  Bowel sounds are normal  He exhibits no distension and no mass  There is no tenderness  There is no rebound and no guarding  Musculoskeletal: Normal range of motion  Neurological: He is alert and oriented to person, place, and time  Skin: Skin is warm and dry  Psychiatric: He has a normal mood and affect  Vitals reviewed          Results  No results found for: PSA  No results found for: GLUCOSE, CALCIUM, NA, K, CO2, CL, BUN, CREATININE  No results found for: WBC, HGB, HCT, MCV, PLT    No results found for this or any previous visit (from the past 1 hour(s)) ]

## 2018-10-08 LAB — SCAN RESULT: NORMAL

## 2018-10-25 ENCOUNTER — OFFICE VISIT (OUTPATIENT)
Dept: UROLOGY | Facility: HOSPITAL | Age: 63
End: 2018-10-25
Payer: COMMERCIAL

## 2018-10-25 VITALS
DIASTOLIC BLOOD PRESSURE: 70 MMHG | BODY MASS INDEX: 32.9 KG/M2 | WEIGHT: 235 LBS | SYSTOLIC BLOOD PRESSURE: 110 MMHG | HEIGHT: 71 IN

## 2018-10-25 DIAGNOSIS — C61 PROSTATE CA (HCC): Primary | ICD-10-CM

## 2018-10-25 PROCEDURE — 99215 OFFICE O/P EST HI 40 MIN: CPT | Performed by: UROLOGY

## 2018-10-26 NOTE — ASSESSMENT & PLAN NOTE
Had a lengthy discussion with the patient and his family  We reviewed his Prolaris test results  His cancer appears to be slightly less aggressive then intermediate risk disease  His 10 year prostate cancer specific mortality with conservative treatment was 3 4%  This is just out of the 3 2% range recommended for active surveillance  We again discussed options including active surveillance, surgery, and radiation therapy  Risks and benefits of all these options were discussed  We discussed his molecular testing risk was just outside of normal parameters for active surveillance  We discussed this would still be a reasonable option given his comorbidities  We discussed that with surgery he does have a higher risk of complications including DVT and pulmonary embolism given his history of factor 5 Leiden mutation  Finally we discussed that radiation treatments would also be a reasonable option  The patient is really hoping to get CyberKnife treatments  At this point however he would like to continue to observe cancer  We will get a PSA now and he will follow up in 4 months with repeat PSA testing

## 2018-10-26 NOTE — PROGRESS NOTES
Assessment/Plan:    Prostate CA [facility])  Had a lengthy discussion with the patient and his family  We reviewed his Prolaris test results  His cancer appears to be slightly less aggressive then intermediate risk disease  His 10 year prostate cancer specific mortality with conservative treatment was 3 4%  This is just out of the 3 2% range recommended for active surveillance  We again discussed options including active surveillance, surgery, and radiation therapy  Risks and benefits of all these options were discussed  We discussed his molecular testing risk was just outside of normal parameters for active surveillance  We discussed this would still be a reasonable option given his comorbidities  We discussed that with surgery he does have a higher risk of complications including DVT and pulmonary embolism given his history of factor 5 Leiden mutation  Finally we discussed that radiation treatments would also be a reasonable option  The patient is really hoping to get CyberKnife treatments  At this point however he would like to continue to observe cancer  We will get a PSA now and he will follow up in 4 months with repeat PSA testing  Diagnoses and all orders for this visit:    Prostate CA ([facility] )  -     PSA Total, Diagnostic; Future  -     PSA Total, Diagnostic; Future          Total visit time was 40 minutes of which over 50% was spent on counseling  Subjective:     Patient ID: Jeanne Foss is a 58 y o  male    78-year-old male presents for follow-up of intermediate risk prostate cancer  Patient is here with his wife and daughter to further discuss treatment options and review his molecular testing results  He has no new complaints            The following portions of the patient's history were reviewed and updated as appropriate: allergies, current medications, past family history, past medical history, past social history, past surgical history and problem list     Review of Systems Constitutional: Negative  HENT: Negative  Eyes: Negative  Respiratory: Negative  Cardiovascular: Negative  Gastrointestinal: Negative  Endocrine: Negative  Genitourinary:        As noted per HPI   Musculoskeletal: Negative  Skin: Negative  Allergic/Immunologic: Negative  Neurological: Negative  Hematological: Negative  Psychiatric/Behavioral: Negative  Objective:    Physical Exam   Constitutional: He is oriented to person, place, and time  He appears well-developed and well-nourished  Neck: Normal range of motion  Cardiovascular: Intact distal pulses  Pulmonary/Chest: Effort normal    Abdominal: Soft  Bowel sounds are normal  He exhibits no distension and no mass  There is no tenderness  There is no rebound and no guarding  Musculoskeletal: Normal range of motion  Neurological: He is alert and oriented to person, place, and time  Skin: Skin is warm and dry  Psychiatric: He has a normal mood and affect  Vitals reviewed          Results  No results found for: PSA  No results found for: GLUCOSE, CALCIUM, NA, K, CO2, CL, BUN, CREATININE  No results found for: WBC, HGB, HCT, MCV, PLT    No results found for this or any previous visit (from the past 1 hour(s)) ]

## 2018-11-01 ENCOUNTER — TELEPHONE (OUTPATIENT)
Dept: UROLOGY | Facility: AMBULATORY SURGERY CENTER | Age: 63
End: 2018-11-01

## 2018-11-01 NOTE — TELEPHONE ENCOUNTER
Patient called about how many DVP's the doctor has done, explained that we don't keep track, but he has done many  He is just trying to see if he wasn't surgery or radiation

## 2018-11-27 NOTE — TELEPHONE ENCOUNTER
Pt's daughter Alen Galvez asking for a recommendation from Dr Lise Bahena for Radiation/Oncologist   [phone]

## 2018-11-28 NOTE — TELEPHONE ENCOUNTER
Called Shira back and left number for [facility] radiation oncology #[phone] - gave Dr Perez's name and that the practise that she is in are all competent

## 2018-12-28 ENCOUNTER — TELEPHONE (OUTPATIENT)
Dept: UROLOGY | Facility: MEDICAL CENTER | Age: 63
End: 2018-12-28

## 2019-01-24 ENCOUNTER — OFFICE VISIT (OUTPATIENT)
Dept: NEUROLOGY | Facility: CLINIC | Age: 64
End: 2019-01-24
Payer: COMMERCIAL

## 2019-01-24 VITALS
DIASTOLIC BLOOD PRESSURE: 80 MMHG | RESPIRATION RATE: 18 BRPM | HEIGHT: 69 IN | HEART RATE: 74 BPM | BODY MASS INDEX: 34.96 KG/M2 | WEIGHT: 236 LBS | SYSTOLIC BLOOD PRESSURE: 110 MMHG

## 2019-01-24 DIAGNOSIS — G40.009 LOCALIZATION-RELATED (FOCAL) (PARTIAL) IDIOPATHIC EPILEPSY AND EPILEPTIC SYNDROMES WITH SEIZURES OF LOCALIZED ONSET, NOT INTRACTABLE, WITHOUT STATUS EPILEPTICUS (HCC): Primary | ICD-10-CM

## 2019-01-24 PROCEDURE — 99213 OFFICE O/P EST LOW 20 MIN: CPT | Performed by: PSYCHIATRY & NEUROLOGY

## 2019-01-24 NOTE — LETTER
January 24, 2019     Anu Chavira MD  [facility] [address]  [address]    Patient: Carmen Black   YOB: 1955   Date of Visit: 1/24/2019       Dear Dr Jyoti Mariano: Thank you for referring Carmen Black to me for evaluation  Below are my notes for this consultation  If you have questions, please do not hesitate to call me  I look forward to following your patient along with you  Sincerely,        Holley Padgett MD        CC: No Recipients  Holley Padgett MD  1/24/2019  9:32 AM  Sign at close encounter  Patient is here today for his seizures    Patient ID: Carmen Black is a 58 y o  male  Assessment/Plan:    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 localized onset, not intractable, without status epilepticus ([facility] )  With focus established by a right hemisphere intra cerebral hemorrhage occurring in the presence of venous side thrombolysis December 2016  With stable, unchanged residual neurologic findings  Continues to do well with no seizure or seizure-like symptoms reported the since his last visit  Last recorded event was July 2, 2017  Reports no adverse side effects from his levetiracetam   --continue levetiracetam 500 mg in a m  And 1000 mg in p m  daily  --to complete his recent blood work, ALT, AST and levetiracetam level  He will follow up in six months or p r n     Subjective:    HPI  The patient, 58years of age, continues his ongoing care here with his historical seizure disorder, characterized as partial in origin and referable to a right hemisphere focus established in the aftermath of a past intra cerebral hemorrhage occurring in 2016 (December)  He has continued since last seen on levetiracetam 500 mg in a m  And a 1000 mg in p m  daily  He has had no symptoms to suggest any seizure or seizure-like events    He reports no symptoms to suggest any adverse side effects  He did have recent blood work performed and those results were reviewed  CBC with hemoglobin 14 7, hematocrit 43, white count 8 4 platelet count 929  BMP unremarkable except for a creatinine of 1 42 with a calculated GFR at 50  Unfortunately, recently diagnosed with prostate cancer and is now working with the [address] [place] [address] where he is now still tentatively scheduled for CyberKnife        Past Medical History:   Diagnosis Date    Diabetes ([facility] )     DVT (deep venous thrombosis) ([place] [address] ) 2016    Dyslipidemia     Factor V deficiency ([facility] )     Hypertension     Lobar cerebral hemorrhage ([facility] )     Multiple pulmonary emboli ([facility] )     Seizures ([facility] ) 07/2017    Stroke ([place] [address] ) 12/14/2016     Past Surgical History:   Procedure Laterality Date    IVC FILTER INSERTION      IVC umbrella    PROSTATE BIOPSY Bilateral 09/06/2018    Dr Jennifer Paez      Social History     Social History    Marital status: /Civil Union     Spouse name: N/A    Number of children: N/A    Years of education: N/A     Social History Main Topics    Smoking status: Former Smoker     Quit date: 1989    Smokeless tobacco: Never Used    Alcohol use No      Comment: not for 1/1/2 years    Drug use: No    Sexual activity: Not Asked     Other Topics Concern    None     Social History Narrative    None     Family History   Problem Relation Age of Onset    Kidney disease Father     Hypertension Father     Diabetes Father     Heart attack Father     Stroke Father     Hypertension Mother     Heart attack Mother     Hypertension Brother      Allergies   Allergen Reactions    Penicillins Hives     Other reaction(s): Unknown  hives, swelling- from youth  hives, swelling- from youth  hives, swelling- from youth       Current Outpatient Prescriptions:     aspirin 81 MG tablet, Take 81 mg by mouth, Disp: , Rfl:     Cholecalciferol (VITAMIN D3) 1000 units CAPS, i po daily, Disp: , Rfl:     cloNIDine (CATAPRES) 0 1 mg tablet, Take 0 1 mg by mouth 2 (two) times a day, Disp: , Rfl: 2    fenofibrate (TRICOR) 54 MG tablet, Take 54 mg by mouth daily, Disp: , Rfl: 1    FLUoxetine (PROzac) 20 mg capsule, TAKE 1 CAPSULE BY ORAL ROUTE EVERY DAY IN THE MORNING, Disp: , Rfl: 2    glipiZIDE (GLUCOTROL XL) 2 5 mg 24 hr tablet, TAKE 1 TABLET BY ORAL ROUTE EVERY DAY WITH BREAKFAST, Disp: , Rfl: 3    levETIRAcetam (KEPPRA) 1000 MG tablet, By oral route 0 5 tablet in the a m  and 1 tablets in p m  daily, Disp: 45 tablet, Rfl: 5    lisinopril (ZESTRIL) 20 mg tablet, Take 20 mg by mouth daily, Disp: , Rfl: 1    LORazepam (ATIVAN) 0 5 mg tablet, Take 0 5 mg by mouth every 8 (eight) hours, Disp: , Rfl:     metFORMIN (GLUCOPHAGE) 500 mg tablet, Take 500 mg by mouth 2 (two) times a day  , Disp: , Rfl:     thiamine (VITAMIN B1) 100 mg tablet, i po daily, Disp: , Rfl:     warfarin (COUMADIN) 5 mg tablet, Take 5 mg by mouth daily, Disp: , Rfl: 1    warfarin (COUMADIN) 1 mg tablet, Take 1 mg by mouth 2 (two) times a day, Disp: , Rfl: 1    Objective:    Blood pressure 110/80, pulse 74, resp  rate 18, height 5' 9" (1 753 m), weight 107 kg (236 lb)  Physical Exam  Lungs clear to auscultation  Rhythm regular  Neurological Exam  Alert  Pleasantly interactive  Fully oriented  Appeared in good mood  No dysarthria  No obvious symbolic language difficulties in general conversation  Unremarkable spontaneous gait  Cranial nerves 2-12 tested and grossly intact except for a slight right facial droop at rest but with a droop on left with smile and the presence by confrontation of a dense left homonymous hemianopsia, all unchanged  Accurate finger-to-nose bilaterally  Good symmetrical strength throughout the four extremities  Muscle stretch reflexes again slightly increased on the left as compared to right, unchanged from the past     ROS:    Review of Systems   Constitutional: Negative  Negative for appetite change and fever     HENT: Negative  Negative for hearing loss, tinnitus, trouble swallowing and voice change  Eyes: Negative  Negative for photophobia and pain  Respiratory: Negative  Negative for shortness of breath  Cardiovascular: Negative  Negative for palpitations  Gastrointestinal: Negative  Negative for nausea and vomiting  Endocrine: Negative  Negative for cold intolerance and heat intolerance  Genitourinary: Positive for frequency  Negative for dysuria and urgency  Musculoskeletal: Negative  Negative for myalgias and neck pain  Skin: Negative  Negative for rash  Allergic/Immunologic: Negative  Neurological: Negative  Negative for dizziness, tremors, seizures, syncope, facial asymmetry, speech difficulty, weakness, light-headedness, numbness and headaches  Hematological: Bruises/bleeds easily  Psychiatric/Behavioral: Negative for confusion and hallucinations  The patient is nervous/anxious  I personally reviewed the ROS that was entered by the medical assistant  *Please note this document was created using voice recognition software and may contain sound-alike word errors  *

## 2019-01-24 NOTE — PROGRESS NOTES
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 localized onset, not intractable, without status epilepticus ([facility] )  With focus established by a right hemisphere intra cerebral hemorrhage occurring in the presence of venous side thrombolysis December 2016  With stable, unchanged residual neurologic findings  Continues to do well with no seizure or seizure-like symptoms reported the since his last visit  Last recorded event was July 2, 2017  Reports no adverse side effects from his levetiracetam   --continue levetiracetam 500 mg in a m  And 1000 mg in p m  daily  --to complete his recent blood work, ALT, AST and levetiracetam level  He will follow up in six months or p r n     Subjective:    HPI  The patient, 58years of age, continues his ongoing care here with his historical seizure disorder, characterized as partial in origin and referable to a right hemisphere focus established in the aftermath of a past intra cerebral hemorrhage occurring in 2016 (December)  He has continued since last seen on levetiracetam 500 mg in a m  And a 1000 mg in p m  daily  He has had no symptoms to suggest any seizure or seizure-like events  He reports no symptoms to suggest any adverse side effects  He did have recent blood work performed and those results were reviewed  CBC with hemoglobin 14 7, hematocrit 43, white count 8 4 platelet count 211  BMP unremarkable except for a creatinine of 1 42 with a calculated GFR at 50  Unfortunately, recently diagnosed with prostate cancer and is now working with the [address] [place] [address] where he is now still tentatively scheduled for CyberKnife        Past Medical History:   Diagnosis Date    Diabetes ([facility] )     DVT (deep venous thrombosis) ([facility] ) 2016    Dyslipidemia     Factor V deficiency ([facility] )     Hypertension     [facility] hemorrhage ([facility] )     [facility]     Seizures ([facility] ) 07/2017    Stroke ([facility] ) 12/14/2016     Past Surgical History:   Procedure Laterality Date    IVC FILTER INSERTION      IVC umbrella    PROSTATE BIOPSY Bilateral 09/06/2018    Dr Jennifer Paez      Social History     Social History    Marital status: /Civil Union     Spouse name: N/A    Number of children: N/A    Years of education: N/A     Social History Main Topics    Smoking status: Former Smoker     Quit date: 1989    Smokeless tobacco: Never Used    Alcohol use No      Comment: not for 1/1/2 years    Drug use: No    Sexual activity: Not Asked     Other Topics Concern    None     Social History Narrative    None     Family History   Problem Relation Age of Onset    Kidney disease Father     Hypertension Father     Diabetes Father     Heart attack Father     Stroke Father     Hypertension Mother     Heart attack Mother     Hypertension Brother      Allergies   Allergen Reactions    Penicillins Hives     Other reaction(s): Unknown  hives, swelling- from youth  hives, swelling- from youth  hives, swelling- from youth       Current Outpatient Prescriptions:     aspirin 81 MG tablet, Take 81 mg by mouth, Disp: , Rfl:     Cholecalciferol (VITAMIN D3) 1000 units CAPS, i po daily, Disp: , Rfl:     cloNIDine (CATAPRES) 0 1 mg tablet, Take 0 1 mg by mouth 2 (two) times a day, Disp: , Rfl: 2    fenofibrate (TRICOR) 54 MG tablet, Take 54 mg by mouth daily, Disp: , Rfl: 1    FLUoxetine (PROzac) 20 mg capsule, TAKE 1 CAPSULE BY ORAL ROUTE EVERY DAY IN THE MORNING, Disp: , Rfl: 2    glipiZIDE (GLUCOTROL XL) 2 5 mg 24 hr tablet, TAKE 1 TABLET BY ORAL ROUTE EVERY DAY WITH BREAKFAST, Disp: , Rfl: 3    levETIRAcetam (KEPPRA) 1000 MG tablet, By oral route 0 5 tablet in the a m  and 1 tablets in p m  daily, Disp: 45 tablet, Rfl: 5    lisinopril (ZESTRIL) 20 mg tablet, Take 20 mg by mouth daily, Disp: , Rfl: 1    LORazepam (ATIVAN) 0 5 mg tablet, Take 0 5 mg by mouth every 8 (eight) hours, Disp: , Rfl:     metFORMIN (GLUCOPHAGE) 500 mg tablet, Take 500 mg by mouth 2 (two) times a day  , Disp: , Rfl:     thiamine (VITAMIN B1) 100 mg tablet, i po daily, Disp: , Rfl:     warfarin (COUMADIN) 5 mg tablet, Take 5 mg by mouth daily, Disp: , Rfl: 1    warfarin (COUMADIN) 1 mg tablet, Take 1 mg by mouth 2 (two) times a day, Disp: , Rfl: 1    Objective:    Blood pressure 110/80, pulse 74, resp  rate 18, height 5' 9" (1 753 m), weight 107 kg (236 lb)  Physical Exam  Lungs clear to auscultation  Rhythm regular  Neurological Exam  Alert  Pleasantly interactive  Fully oriented  Appeared in good mood  No dysarthria  No obvious symbolic language difficulties in general conversation  Unremarkable spontaneous gait  Cranial nerves 2-12 tested and grossly intact except for a slight right facial droop at rest but with a droop on left with smile and the presence by confrontation of a dense left homonymous hemianopsia, all unchanged  Accurate finger-to-nose bilaterally  Good symmetrical strength throughout the four extremities  Muscle stretch reflexes again slightly increased on the left as compared to right, unchanged from the past     ROS:    Review of Systems   Constitutional: Negative  Negative for appetite change and fever  HENT: Negative  Negative for hearing loss, tinnitus, trouble swallowing and voice change  Eyes: Negative  Negative for photophobia and pain  Respiratory: Negative  Negative for shortness of breath  Cardiovascular: Negative  Negative for palpitations  Gastrointestinal: Negative  Negative for nausea and vomiting  Endocrine: Negative  Negative for cold intolerance and heat intolerance  Genitourinary: Positive for frequency  Negative for dysuria and urgency  Musculoskeletal: Negative  Negative for myalgias and neck pain  Skin: Negative  Negative for rash  Allergic/Immunologic: Negative  Neurological: Negative  Negative for dizziness, tremors, seizures, syncope, facial asymmetry, speech difficulty, weakness, light-headedness, numbness and headaches  Hematological: Bruises/bleeds easily  Psychiatric/Behavioral: Negative for confusion and hallucinations  The patient is nervous/anxious  I personally reviewed the ROS that was entered by the medical assistant  *Please note this document was created using voice recognition software and may contain sound-alike word errors  *

## 2019-01-24 NOTE — ASSESSMENT & PLAN NOTE
With focus established by a right hemisphere intra cerebral hemorrhage occurring in the presence of venous side thrombolysis December 2016  With stable, unchanged residual neurologic findings  Continues to do well with no seizure or seizure-like symptoms reported the since his last visit  Last recorded event was July 2, 2017  Reports no adverse side effects from his levetiracetam   --continue levetiracetam 500 mg in a m  And 1000 mg in p m  daily  --to complete his recent blood work, ALT, AST and levetiracetam level

## 2019-05-08 ENCOUNTER — TELEPHONE (OUTPATIENT)
Dept: NEUROLOGY | Facility: CLINIC | Age: 64
End: 2019-05-08

## 2019-06-25 ENCOUNTER — TELEPHONE (OUTPATIENT)
Dept: NEUROLOGY | Facility: CLINIC | Age: 64
End: 2019-06-25

## 2019-07-25 ENCOUNTER — OFFICE VISIT (OUTPATIENT)
Dept: NEUROLOGY | Facility: CLINIC | Age: 64
End: 2019-07-25
Payer: MEDICARE

## 2019-07-25 VITALS
HEART RATE: 78 BPM | DIASTOLIC BLOOD PRESSURE: 72 MMHG | WEIGHT: 231 LBS | BODY MASS INDEX: 32.34 KG/M2 | RESPIRATION RATE: 16 BRPM | HEIGHT: 71 IN | SYSTOLIC BLOOD PRESSURE: 104 MMHG

## 2019-07-25 DIAGNOSIS — G40.009 LOCALIZATION-RELATED (FOCAL) (PARTIAL) IDIOPATHIC EPILEPSY AND EPILEPTIC SYNDROMES WITH SEIZURES OF LOCALIZED ONSET, NOT INTRACTABLE, WITHOUT STATUS EPILEPTICUS (HCC): Primary | ICD-10-CM

## 2019-07-25 PROCEDURE — 99213 OFFICE O/P EST LOW 20 MIN: CPT | Performed by: PSYCHIATRY & NEUROLOGY

## 2019-07-25 RX ORDER — ATORVASTATIN CALCIUM 10 MG/1
10 TABLET, FILM COATED ORAL DAILY
Refills: 3 | COMMUNITY
Start: 2019-07-22

## 2019-07-25 NOTE — PROGRESS NOTES
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 localized onset, not intractable, without status epilepticus ([facility] )  Again, focus established by 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  Continues to do well with no seizure or seizure-like symptoms reported  Last recorded event July 2, 2017  Continues on levetiracetam 500 milligrams in the morning and a 1000 milligrams in the evening daily and reports no adverse side effects  Stable neurologic examination with unchanged residual neurologic findings  --continue levetiracetam 1000 milligram tablets taking half tablet in the morning and a full tablet in the evening daily  --with his modestly reduced calculated GFR, follow-up levetiracetam level  He will follow up in 6 months or p r n     Subjective:    HPI  Patient, 58years of age, is followed for his historical seizure disorder, characterized as partial in origin and referable to a right hemisphere focus established in the aftermath of a past intra cerebral hemorrhage occurring in December 2016 (in conjunction with venous side thrombolysis)  Today was again accompanied by his wife  He has continued on his levetiracetam 500 milligrams in t last recorded event July 2, 2017  he morning and a 1000 milligrams in the evening daily  He has had no seizure or seizure-like symptoms since his last appointment  He reports no symptoms that would suggest any adverse side effects from the levetiracetam   Last recorded event July 2, 2017  Blood work from this past April reviewed  CBC with hemoglobin 13 3, hematocrit 38 6, white count 5 2 and platelet count 991  CMP with creatinine 1 35 and a modestly compromised calculated GFR 55       Past Medical History:   Diagnosis Date    Diabetes ([facility] )     DVT (deep venous thrombosis) ([facility] ) 2016    Dyslipidemia     Factor V deficiency ([facility] )     Hypertension     Lobar cerebral hemorrhage ([facility] )     Multiple pulmonary emboli (HCC)     Seizures ([facility] ) 2017    Stroke ([facility] ) 2016     Past Surgical History:   Procedure Laterality Date    IVC FILTER INSERTION      IVC umbrella    PROSTATE BIOPSY Bilateral 2018    Dr Lacie Hale      Social History     Socioeconomic History    Marital status: /Civil Union     Spouse name: None    Number of children: None    Years of education: None    Highest education level: None   Occupational History    None   Social Needs    Financial resource strain: None    Food insecurity:     Worry: None     Inability: None    Transportation needs:     Medical: None     Non-medical: None   Tobacco Use    Smoking status: Former Smoker     Last attempt to quit:      Years since quittin 5    Smokeless tobacco: Never Used   Substance and Sexual Activity    Alcohol use: No     Comment: not for 1/1/2 years    Drug use: No    Sexual activity: None   Lifestyle    Physical activity:     Days per week: None     Minutes per session: None    Stress: None   Relationships    Social connections:     Talks on phone: None     Gets together: None     Attends Adventism service: None     Active member of club or organization: None     Attends meetings of clubs or organizations: None     Relationship status: None    Intimate partner violence:     Fear of current or ex partner: None     Emotionally abused: None     Physically abused: None     Forced sexual activity: None   Other Topics Concern    None   Social History Narrative    None     Family History   Problem Relation Age of Onset    Kidney disease Father     Hypertension Father     Diabetes Father     Heart attack Father     Stroke Father     Hypertension Mother     Heart attack Mother     Hypertension Brother      Allergies   Allergen Reactions    Penicillins Hives     Other reaction(s): Unknown  hives, swelling- from youth  hives, swelling- from youth  hives, swelling- from youth       Current Outpatient Medications:     aspirin 81 MG tablet, Take 81 mg by mouth, Disp: , Rfl:     Cholecalciferol (VITAMIN D3) 1000 units CAPS, i po daily, Disp: , Rfl:     cloNIDine (CATAPRES) 0 1 mg tablet, Take 0 1 mg by mouth 2 (two) times a day, Disp: , Rfl: 2    fenofibrate (TRICOR) 54 MG tablet, Take 160 mg by mouth daily , Disp: , Rfl: 1    FLUoxetine (PROzac) 20 mg capsule, TAKE 1 CAPSULE BY ORAL ROUTE EVERY DAY IN THE MORNING, Disp: , Rfl: 2    glipiZIDE (GLUCOTROL XL) 2 5 mg 24 hr tablet, 10 mg , Disp: , Rfl: 3    levETIRAcetam (KEPPRA) 1000 MG tablet, By oral route 0 5 tablet in the a m  and 1 tablets in p m  daily, Disp: 45 tablet, Rfl: 5    lisinopril (ZESTRIL) 20 mg tablet, Take 20 mg by mouth daily, Disp: , Rfl: 1    LORazepam (ATIVAN) 0 5 mg tablet, Take 0 5 mg by mouth every 8 (eight) hours, Disp: , Rfl:     metFORMIN (GLUCOPHAGE) 500 mg tablet, Take 500 mg by mouth 2 (two) times a day  , Disp: , Rfl:     thiamine (VITAMIN B1) 100 mg tablet, i po daily, Disp: , Rfl:     warfarin (COUMADIN) 5 mg tablet, Take 5 mg by mouth daily Everyday, Disp: , Rfl: 1    atorvastatin (LIPITOR) 10 mg tablet, Take 10 mg by mouth daily, Disp: , Rfl: 3    warfarin (COUMADIN) 1 mg tablet, Take 5 mg by mouth 2 (two) times a day , Disp: , Rfl: 1    Objective:    Blood pressure 104/72, pulse 78, resp  rate 16, height 5' 11" (1 803 m), weight 105 kg (231 lb)  Physical Exam  Head normocephalic  Eyes nonicteric  No audible anterior neck bruits  Lungs clear to auscultation  Rhythm regular  GI (abdomen) soft nontender  Bowel sounds present  No significant lower extremity edema  Neurological Exam  Alert  Pleasantly interactive  Fully oriented  Gait independent    Cranial nerves 2-12 tested and grossly intact except for the following:  Slight right facial asymmetry at rest but with droop on left with smile; by confrontation a persistent dense left homonymous hemianopsia  Both findings unchanged from the past   Funduscopic examination with bilaterally marginated discs  Good symmetrical strength throughout the 4 extremities  Muscle stretch reflexes slightly increased on the left as compared to right, unchanged from prior examinations  ROS:    Review of Systems   Constitutional: Negative  Negative for appetite change and fever  HENT: Negative  Negative for hearing loss, tinnitus, trouble swallowing and voice change  Eyes: Negative  Negative for photophobia and pain  Respiratory: Negative  Negative for shortness of breath  Cardiovascular: Negative  Negative for palpitations  Gastrointestinal: Negative  Negative for nausea and vomiting  Endocrine: Negative  Negative for cold intolerance and heat intolerance  Genitourinary: Negative  Negative for dysuria, frequency and urgency  Musculoskeletal: Negative  Negative for myalgias and neck pain  Skin: Negative  Negative for rash  Allergic/Immunologic: Negative  Neurological: Positive for light-headedness  Negative for dizziness, tremors, seizures, syncope, facial asymmetry, speech difficulty, weakness, numbness and headaches  Hematological: Negative  Does not bruise/bleed easily  Psychiatric/Behavioral: Negative  Negative for confusion, hallucinations and sleep disturbance  I personally reviewed the ROS that was entered by the medical assistant  *Please note this document was created using voice recognition software and may contain sound-alike word errors  *

## 2019-07-25 NOTE — LETTER
July 25, 2019     Amira Borrero MD  [facility] [address]  [address] [place] [address]    Patient: Harsha Avila   YOB: 1955   Date of Visit: 7/25/2019       Dear Dr Ayden Hernandez: Thank you for referring Harsha Avila to me for evaluation  Below are my notes for this consultation  If you have questions, please do not hesitate to call me  I look forward to following your patient along with you  Sincerely,        Thom Ayala MD        CC: No Recipients  Thom Ayala MD  7/25/2019  1:05 PM  Sign at close encounter  Patient ID: Harsha Avila is a 58 y o  male  Assessment/Plan:    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 localized onset, not intractable, without status epilepticus ([facility] )  Again, focus established by 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  Continues to do well with no seizure or seizure-like symptoms reported  Last recorded event July 2, 2017  Continues on levetiracetam 500 milligrams in the morning and a 1000 milligrams in the evening daily and reports no adverse side effects  Stable neurologic examination with unchanged residual neurologic findings  --continue levetiracetam 1000 milligram tablets taking half tablet in the morning and a full tablet in the evening daily  --with his modestly reduced calculated GFR, follow-up levetiracetam level  He will follow up in 6 months or p r n     Subjective:    HPI  Patient, 58years of age, is followed for his historical seizure disorder, characterized as partial in origin and referable to a right hemisphere focus established in the aftermath of a past intra cerebral hemorrhage occurring in December 2016 (in conjunction with venous side thrombolysis)  Today was again accompanied by his wife    He has continued on his levetiracetam 500 milligrams in t last recorded event 2017  he morning and a 1000 milligrams in the evening daily  He has had no seizure or seizure-like symptoms since his last appointment  He reports no symptoms that would suggest any adverse side effects from the levetiracetam   Last recorded event 2017  Blood work from this past April reviewed  CBC with hemoglobin 13 3, hematocrit 38 6, white count 5 2 and platelet count 385  CMP with creatinine 1 35 and a modestly compromised calculated GFR 55       Past Medical History:   Diagnosis Date    Diabetes ([place] [address] )     DVT (deep venous thrombosis) ([place] [address] )     Dyslipidemia     Factor V deficiency ([place] [address] )     Hypertension     Lobar cerebral hemorrhage ([place] [address] )     Multiple pulmonary emboli (HCC)     Seizures ([place] [address] ) 2017    Stroke ([place] [address] ) 2016     Past Surgical History:   Procedure Laterality Date    IVC FILTER INSERTION      IVC umbrella    PROSTATE BIOPSY Bilateral 2018    Dr Allison S[place]      Social History     Socioeconomic History    Marital status: /Civil Union     Spouse name: None    Number of children: None    Years of education: None    Highest education level: None   Occupational History    None   Social Needs    Financial resource strain: None    Food insecurity:     Worry: None     Inability: None    Transportation needs:     Medical: None     Non-medical: None   Tobacco Use    Smoking status: Former Smoker     Last attempt to quit:      Years since quittin 5    Smokeless tobacco: Never Used   Substance and Sexual Activity    Alcohol use: No     Comment: not for 1 years    Drug use: No    Sexual activity: None   Lifestyle    Physical activity:     Days per week: None     Minutes per session: None    Stress: None   Relationships    Social connections:     Talks on phone: None     Gets together: None     Attends Zoroastrian service: None     Active member of club or organization: None     Attends meetings of clubs or organizations: None Relationship status: None    Intimate partner violence:     Fear of current or ex partner: None     Emotionally abused: None     Physically abused: None     Forced sexual activity: None   Other Topics Concern    None   Social History Narrative    None     Family History   Problem Relation Age of Onset    Kidney disease Father     Hypertension Father     Diabetes Father     Heart attack Father     Stroke Father     Hypertension Mother     Heart attack Mother     Hypertension Brother      Allergies   Allergen Reactions    Penicillins Hives     Other reaction(s): Unknown  hives, swelling- from youth  hives, swelling- from youth  hives, swelling- from youth       Current Outpatient Medications:     aspirin 81 MG tablet, Take 81 mg by mouth, Disp: , Rfl:     Cholecalciferol (VITAMIN D3) 1000 units CAPS, i po daily, Disp: , Rfl:     cloNIDine (CATAPRES) 0 1 mg tablet, Take 0 1 mg by mouth 2 (two) times a day, Disp: , Rfl: 2    fenofibrate (TRICOR) 54 MG tablet, Take 160 mg by mouth daily , Disp: , Rfl: 1    FLUoxetine (PROzac) 20 mg capsule, TAKE 1 CAPSULE BY ORAL ROUTE EVERY DAY IN THE MORNING, Disp: , Rfl: 2    glipiZIDE (GLUCOTROL XL) 2 5 mg 24 hr tablet, 10 mg , Disp: , Rfl: 3    levETIRAcetam (KEPPRA) 1000 MG tablet, By oral route 0 5 tablet in the a m  and 1 tablets in p m  daily, Disp: 45 tablet, Rfl: 5    lisinopril (ZESTRIL) 20 mg tablet, Take 20 mg by mouth daily, Disp: , Rfl: 1    LORazepam (ATIVAN) 0 5 mg tablet, Take 0 5 mg by mouth every 8 (eight) hours, Disp: , Rfl:     metFORMIN (GLUCOPHAGE) 500 mg tablet, Take 500 mg by mouth 2 (two) times a day  , Disp: , Rfl:     thiamine (VITAMIN B1) 100 mg tablet, i po daily, Disp: , Rfl:     warfarin (COUMADIN) 5 mg tablet, Take 5 mg by mouth daily Everyday, Disp: , Rfl: 1    atorvastatin (LIPITOR) 10 mg tablet, Take 10 mg by mouth daily, Disp: , Rfl: 3    warfarin (COUMADIN) 1 mg tablet, Take 5 mg by mouth 2 (two) times a day , Disp: , Rfl: 1    Objective:    Blood pressure 104/72, pulse 78, resp  rate 16, height 5' 11" (1 803 m), weight 105 kg (231 lb)  Physical Exam  Head normocephalic  Eyes nonicteric  No audible anterior neck bruits  Lungs clear to auscultation  Rhythm regular  GI (abdomen) soft nontender  Bowel sounds present  No significant lower extremity edema  Neurological Exam  Alert  Pleasantly interactive  Fully oriented  Gait independent  Cranial nerves 2-12 tested and grossly intact except for the following:  Slight right facial asymmetry at rest but with droop on left with smile; by confrontation a persistent dense left homonymous hemianopsia  Both findings unchanged from the past   Funduscopic examination with bilaterally marginated discs  Good symmetrical strength throughout the 4 extremities  Muscle stretch reflexes slightly increased on the left as compared to right, unchanged from prior examinations  ROS:    Review of Systems   Constitutional: Negative  Negative for appetite change and fever  HENT: Negative  Negative for hearing loss, tinnitus, trouble swallowing and voice change  Eyes: Negative  Negative for photophobia and pain  Respiratory: Negative  Negative for shortness of breath  Cardiovascular: Negative  Negative for palpitations  Gastrointestinal: Negative  Negative for nausea and vomiting  Endocrine: Negative  Negative for cold intolerance and heat intolerance  Genitourinary: Negative  Negative for dysuria, frequency and urgency  Musculoskeletal: Negative  Negative for myalgias and neck pain  Skin: Negative  Negative for rash  Allergic/Immunologic: Negative  Neurological: Positive for light-headedness  Negative for dizziness, tremors, seizures, syncope, facial asymmetry, speech difficulty, weakness, numbness and headaches  Hematological: Negative  Does not bruise/bleed easily  Psychiatric/Behavioral: Negative    Negative for confusion, hallucinations and sleep disturbance  I personally reviewed the ROS that was entered by the medical assistant  *Please note this document was created using voice recognition software and may contain sound-alike word errors  *

## 2019-07-25 NOTE — ASSESSMENT & PLAN NOTE
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

## 2019-08-22 DIAGNOSIS — G40.009 EPILEPSY WITH SEIZURES OF LOCALIZED ONSET (HCC): ICD-10-CM

## 2019-08-22 RX ORDER — LEVETIRACETAM 1000 MG/1
TABLET ORAL
Qty: 135 TABLET | Refills: 1 | Status: SHIPPED | OUTPATIENT
Start: 2019-08-22 | End: 2020-02-09

## 2020-01-29 ENCOUNTER — TELEPHONE (OUTPATIENT)
Dept: NEUROLOGY | Facility: CLINIC | Age: 65
End: 2020-01-29

## 2020-01-30 ENCOUNTER — OFFICE VISIT (OUTPATIENT)
Dept: NEUROLOGY | Facility: CLINIC | Age: 65
End: 2020-01-30
Payer: MEDICARE

## 2020-01-30 VITALS
SYSTOLIC BLOOD PRESSURE: 138 MMHG | WEIGHT: 238.6 LBS | DIASTOLIC BLOOD PRESSURE: 82 MMHG | RESPIRATION RATE: 18 BRPM | BODY MASS INDEX: 33.4 KG/M2 | HEART RATE: 78 BPM | HEIGHT: 71 IN

## 2020-01-30 DIAGNOSIS — G40.009 LOCALIZATION-RELATED (FOCAL) (PARTIAL) IDIOPATHIC EPILEPSY AND EPILEPTIC SYNDROMES WITH SEIZURES OF LOCALIZED ONSET, NOT INTRACTABLE, WITHOUT STATUS EPILEPTICUS (HCC): Primary | ICD-10-CM

## 2020-01-30 PROCEDURE — 99213 OFFICE O/P EST LOW 20 MIN: CPT | Performed by: PSYCHIATRY & NEUROLOGY

## 2020-01-30 RX ORDER — WARFARIN SODIUM 1 MG/1
TABLET ORAL
COMMUNITY
Start: 2019-10-07

## 2020-01-30 RX ORDER — WARFARIN SODIUM 4 MG/1
4 TABLET ORAL
COMMUNITY
Start: 2019-09-17

## 2020-01-30 NOTE — PROGRESS NOTES
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 localized onset, not intractable, without status epilepticus ([facility] )  With focus established by 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  Continues to do well  Remains on levetiracetam with no reported seizure or seizure-like symptoms since his last recorded event on July 2, 2017  Reports no symptoms to suggest any adverse side effects from the levetiracetam   --continue levetiracetam 1000 mg tablets taking 1/2 tablet in the morning and 1 full tablet in the evening daily  --recheck BMP, attention creatinine and GFR along with the levetiracetam level given his compromised GFR  He will follow up at years end  Subjective:    HPI  The patient, now 71years of age, follows for his seizure disorder, characterized as partial in onset and referable to a right hemisphere focus  That focus was established in the aftermath of an intra cerebral hemorrhage occurring in December 2016 (in conjunction with venous side thrombolysis)  He was again accompanied by his wife  He has continued on levetiracetam 500 mg in the morning and 1000 mg in the evening daily  He has had no seizure or seizure-like symptoms to report  Last recorded event July 2, 2017  He reports no symptoms that would suggest any adverse side effects from the levetiracetam     Recent blood work results reviewed:  CBC with hemoglobin 13 5, hematocrit 39 5, white count 6 4 and platelet count 514; CMP with AST 20 and ALT 24 and creatinine mildly compromised at 1 53 with a calculated GFR 47       Past Medical History:   Diagnosis Date    Diabetes ([facility] )     DVT (deep venous thrombosis) ([place] [address] ) 2016    Dyslipidemia     Factor V deficiency ([place] [address] )     Hypertension     Lobar cerebral hemorrhage (HCC)     Multiple pulmonary emboli ([facility] )     Seizures ([facility] ) 2017    Stroke ([place] [address] ) 2016     Past Surgical History:   Procedure Laterality Date    IVC FILTER INSERTION      IVC umbrella    PROSTATE BIOPSY Bilateral 2018    Dr Renetta Looney History     Socioeconomic History    Marital status: /Civil Union     Spouse name: None    Number of children: None    Years of education: None    Highest education level: None   Occupational History    None   Social Needs    Financial resource strain: None    Food insecurity:     Worry: None     Inability: None    Transportation needs:     Medical: None     Non-medical: None   Tobacco Use    Smoking status: Former Smoker     Last attempt to quit:      Years since quittin 0    Smokeless tobacco: Never Used   Substance and Sexual Activity    Alcohol use: No     Comment: not for  years    Drug use: No    Sexual activity: None   Lifestyle    Physical activity:     Days per week: None     Minutes per session: None    Stress: None   Relationships    Social connections:     Talks on phone: None     Gets together: None     Attends Bahai service: None     Active member of club or organization: None     Attends meetings of clubs or organizations: None     Relationship status: None    Intimate partner violence:     Fear of current or ex partner: None     Emotionally abused: None     Physically abused: None     Forced sexual activity: None   Other Topics Concern    None   Social History Narrative    None     Family History   Problem Relation Age of Onset    Kidney disease Father     Hypertension Father     Diabetes Father     Heart attack Father     Stroke Father     Hypertension Mother     Heart attack Mother     Hypertension Brother      Allergies   Allergen Reactions    Penicillins Hives     Other reaction(s): Unknown  hives, swelling- from youth  hives, swelling- from youth  hives, swelling- from youth       Current Outpatient Medications:    aspirin 81 MG tablet, Take 81 mg by mouth, Disp: , Rfl:     atorvastatin (LIPITOR) 10 mg tablet, Take 10 mg by mouth daily, Disp: , Rfl: 3    Cholecalciferol (VITAMIN D3) 1000 units CAPS, i po daily, Disp: , Rfl:     cloNIDine (CATAPRES) 0 1 mg tablet, Take 0 1 mg by mouth 2 (two) times a day, Disp: , Rfl: 2    Cyanocobalamin (VITAMIN B-12 PO), use as directed, Disp: , Rfl:     fenofibrate (TRICOR) 54 MG tablet, Take 160 mg by mouth daily , Disp: , Rfl: 1    FLUoxetine (PROzac) 20 mg capsule, TAKE 1 CAPSULE BY ORAL ROUTE EVERY DAY IN THE MORNING, Disp: , Rfl: 2    glipiZIDE (GLUCOTROL XL) 2 5 mg 24 hr tablet, 10 mg , Disp: , Rfl: 3    levETIRAcetam (KEPPRA) 1000 MG tablet, By oral route 0 5 tablet in the a m  and 1 tablets in p m  daily, Disp: 135 tablet, Rfl: 1    lisinopril (ZESTRIL) 20 mg tablet, Take 20 mg by mouth daily, Disp: , Rfl: 1    LORazepam (ATIVAN) 0 5 mg tablet, Take 0 5 mg by mouth every 8 (eight) hours, Disp: , Rfl:     metFORMIN (GLUCOPHAGE) 500 mg tablet, Take 500 mg by mouth 2 (two) times a day  , Disp: , Rfl:     thiamine (VITAMIN B1) 100 mg tablet, i po daily, Disp: , Rfl:     warfarin (COUMADIN) 1 mg tablet, take daily per warfarin protocol, Disp: , Rfl:     warfarin (COUMADIN) 4 mg tablet, TAKE 1 TABLET BY MOUTH EVERY DAY, Disp: , Rfl:     warfarin (COUMADIN) 5 mg tablet, Take 5 mg by mouth daily Everyday, Disp: , Rfl: 1    Objective:    Blood pressure 138/82, pulse 78, resp  rate 18, height 5' 11" (1 803 m), weight 108 kg (238 lb 9 6 oz)  Physical Exam  Head normocephalic  Eyes nonicteric  No audible anterior neck bruits  Lungs clear to auscultation  Rhythm regular  GI (abdomen) soft nontender  Bowel sounds present  No significant lower extremity edema  Neurological Exam  Alert  Very pleasantly interactive and appropriately conversational   Fully oriented  Unremarkable spontaneous gait    Cranial nerves 2-12 tested and grossly intact except for the following: Confrontational dense left homonymous hemianopsia/modest right facial asymmetry at rest but withdrew on left with smile, findings unchanged from prior examination  Funduscopic examination with marginated discs bilaterally  Essentially full symmetrical strength throughout the 4 extremities with no upper extremity drift  With pin testing related a subtle reduction in pin on left as compared to right  Muscle stretch reflexes slightly increased on left as compared to right  ROS:    Review of Systems   Constitutional: Negative  Negative for appetite change and fever  HENT: Negative  Negative for hearing loss, tinnitus, trouble swallowing and voice change  Eyes: Negative  Negative for photophobia and pain  Respiratory: Negative  Negative for shortness of breath  Cardiovascular: Negative  Negative for palpitations  Gastrointestinal: Negative  Negative for nausea and vomiting  Endocrine: Negative  Negative for cold intolerance and heat intolerance  Genitourinary: Negative  Negative for dysuria, frequency and urgency  Musculoskeletal: Negative  Negative for myalgias and neck pain  Skin: Negative  Negative for rash  Allergic/Immunologic: Negative  Neurological: Negative  Negative for dizziness, tremors, seizures, syncope, facial asymmetry, speech difficulty, weakness, light-headedness, numbness and headaches  Hematological: Bruises/bleeds easily  Psychiatric/Behavioral: Negative  Negative for confusion, hallucinations and sleep disturbance  I personally reviewed the ROS as entered by the medical assistant  *Please note this document was created using voice recognition software and may contain sound-alike word errors  *

## 2020-01-30 NOTE — LETTER
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 localized onset, not intractable, without status epilepticus ([facility] [address] )  With focus established by 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  Continues to do well  Remains on levetiracetam with no reported seizure or seizure-like symptoms since his last recorded event on July 2, 2017  Reports no symptoms to suggest any adverse side effects from the levetiracetam   --continue levetiracetam 1000 mg tablets taking 1/2 tablet in the morning and 1 full tablet in the evening daily  --recheck BMP, attention creatinine and GFR along with the levetiracetam level given his compromised GFR  He will follow up at years end  Subjective:    HPI  The patient, now 71years of age, follows for his seizure disorder, characterized as partial in onset and referable to a right hemisphere focus  That focus was established in the aftermath of an intra cerebral hemorrhage occurring in December 2016 (in conjunction with venous side thrombolysis)  He was again accompanied by his wife  He has continued on levetiracetam 500 mg in the morning and 1000 mg in the evening daily  He has had no seizure or seizure-like symptoms to report    Last recorded event 2017  He reports no symptoms that would suggest any adverse side effects from the levetiracetam     Recent blood work results reviewed:  CBC with hemoglobin 13 5, hematocrit 39 5, white count 6 4 and platelet count 070; CMP with AST 20 and ALT 24 and creatinine mildly compromised at 1 53 with a calculated GFR 47       Past Medical History:   Diagnosis Date    Diabetes ([place] [address] )     DVT (deep venous thrombosis) ([place] [address] )     Dyslipidemia     Factor V deficiency ([place] [address] )     Hypertension     Lobar cerebral hemorrhage ([place] [address] )     Multiple pulmonary emboli (HCC)     Seizures ([place] [address] ) 2017    Stroke ([place] [address] ) 2016     Past Surgical History:   Procedure Laterality Date    IVC FILTER INSERTION      IVC umbrella    PROSTATE BIOPSY Bilateral 2018    Dr Anitha Bolden History     Socioeconomic History    Marital status: /Civil Union     Spouse name: None    Number of children: None    Years of education: None    Highest education level: None   Occupational History    None   Social Needs    Financial resource strain: None    Food insecurity:     Worry: None     Inability: None    Transportation needs:     Medical: None     Non-medical: None   Tobacco Use    Smoking status: Former Smoker     Last attempt to quit:      Years since quittin 0    Smokeless tobacco: Never Used   Substance and Sexual Activity    Alcohol use: No     Comment: not for 12 years    Drug use: No    Sexual activity: None   Lifestyle    Physical activity:     Days per week: None     Minutes per session: None    Stress: None   Relationships    Social connections:     Talks on phone: None     Gets together: None     Attends Christianity service: None     Active member of club or organization: None     Attends meetings of clubs or organizations: None     Relationship status: None    Intimate partner violence:     Fear of current or ex partner: None     Emotionally abused: None Physically abused: None     Forced sexual activity: None   Other Topics Concern    None   Social History Narrative    None     Family History   Problem Relation Age of Onset    Kidney disease Father     Hypertension Father     Diabetes Father     Heart attack Father     Stroke Father     Hypertension Mother     Heart attack Mother     Hypertension Brother      Allergies   Allergen Reactions    Penicillins Hives     Other reaction(s): Unknown  hives, swelling- from youth  hives, swelling- from youth  hives, swelling- from youth       Current Outpatient Medications:     aspirin 81 MG tablet, Take 81 mg by mouth, Disp: , Rfl:     atorvastatin (LIPITOR) 10 mg tablet, Take 10 mg by mouth daily, Disp: , Rfl: 3    Cholecalciferol (VITAMIN D3) 1000 units CAPS, i po daily, Disp: , Rfl:     cloNIDine (CATAPRES) 0 1 mg tablet, Take 0 1 mg by mouth 2 (two) times a day, Disp: , Rfl: 2    Cyanocobalamin (VITAMIN B-12 PO), use as directed, Disp: , Rfl:     fenofibrate (TRICOR) 54 MG tablet, Take 160 mg by mouth daily , Disp: , Rfl: 1    FLUoxetine (PROzac) 20 mg capsule, TAKE 1 CAPSULE BY ORAL ROUTE EVERY DAY IN THE MORNING, Disp: , Rfl: 2    glipiZIDE (GLUCOTROL XL) 2 5 mg 24 hr tablet, 10 mg , Disp: , Rfl: 3    levETIRAcetam (KEPPRA) 1000 MG tablet, By oral route 0 5 tablet in the a m  and 1 tablets in p m  daily, Disp: 135 tablet, Rfl: 1    lisinopril (ZESTRIL) 20 mg tablet, Take 20 mg by mouth daily, Disp: , Rfl: 1    LORazepam (ATIVAN) 0 5 mg tablet, Take 0 5 mg by mouth every 8 (eight) hours, Disp: , Rfl:     metFORMIN (GLUCOPHAGE) 500 mg tablet, Take 500 mg by mouth 2 (two) times a day  , Disp: , Rfl:     thiamine (VITAMIN B1) 100 mg tablet, i po daily, Disp: , Rfl:     warfarin (COUMADIN) 1 mg tablet, take daily per warfarin protocol, Disp: , Rfl:     warfarin (COUMADIN) 4 mg tablet, TAKE 1 TABLET BY MOUTH EVERY DAY, Disp: , Rfl:     warfarin (COUMADIN) 5 mg tablet, Take 5 mg by mouth daily Everyday, Disp: , Rfl: 1    Objective:    Blood pressure 138/82, pulse 78, resp  rate 18, height 5' 11" (1 803 m), weight 108 kg (238 lb 9 6 oz)  Physical Exam  Head normocephalic  Eyes nonicteric  No audible anterior neck bruits  Lungs clear to auscultation  Rhythm regular  GI (abdomen) soft nontender  Bowel sounds present  No significant lower extremity edema  Neurological Exam  Alert  Very pleasantly interactive and appropriately conversational   Fully oriented  Unremarkable spontaneous gait  Cranial nerves 2-12 tested and grossly intact except for the following:  Confrontational dense left homonymous hemianopsia/modest right facial asymmetry at rest but withdrew on left with smile, findings unchanged from prior examination  Funduscopic examination with marginated discs bilaterally  Essentially full symmetrical strength throughout the 4 extremities with no upper extremity drift  With pin testing related a subtle reduction in pin on left as compared to right  Muscle stretch reflexes slightly increased on left as compared to right  ROS:    Review of Systems   Constitutional: Negative  Negative for appetite change and fever  HENT: Negative  Negative for hearing loss, tinnitus, trouble swallowing and voice change  Eyes: Negative  Negative for photophobia and pain  Respiratory: Negative  Negative for shortness of breath  Cardiovascular: Negative  Negative for palpitations  Gastrointestinal: Negative  Negative for nausea and vomiting  Endocrine: Negative  Negative for cold intolerance and heat intolerance  Genitourinary: Negative  Negative for dysuria, frequency and urgency  Musculoskeletal: Negative  Negative for myalgias and neck pain  Skin: Negative  Negative for rash  Allergic/Immunologic: Negative  Neurological: Negative    Negative for dizziness, tremors, seizures, syncope, facial asymmetry, speech difficulty, weakness, light-headedness, numbness and headaches  Hematological: Bruises/bleeds easily  Psychiatric/Behavioral: Negative  Negative for confusion, hallucinations and sleep disturbance  I personally reviewed the ROS as entered by the medical assistant  *Please note this document was created using voice recognition software and may contain sound-alike word errors  *

## 2020-01-30 NOTE — ASSESSMENT & PLAN NOTE
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

## 2020-02-09 DIAGNOSIS — G40.009 EPILEPSY WITH SEIZURES OF LOCALIZED ONSET (HCC): ICD-10-CM

## 2020-02-09 RX ORDER — LEVETIRACETAM 1000 MG/1
TABLET ORAL
Qty: 135 TABLET | Refills: 1 | Status: SHIPPED | OUTPATIENT
Start: 2020-02-09 | End: 2020-08-10

## 2020-02-13 ENCOUNTER — TELEPHONE (OUTPATIENT)
Dept: NEUROLOGY | Facility: CLINIC | Age: 65
End: 2020-02-13

## 2020-02-13 NOTE — TELEPHONE ENCOUNTER
Patient calling regarding his keppra level  He advised that he has received his results  I did let him know that we have not yet received these  He is going to fax these over to us  Patient did state that he took medication approximately 1 hour prior to having blood work completed

## 2020-02-17 NOTE — TELEPHONE ENCOUNTER
Patient calling again regarding below  He is concerned as his Keppra level was 12 1 in the end of August 2019  Recent level in February 2020 was 22 9  He denies change in dose  States he did take his medication 1 hour before lab was drawn  Wants to confirm lab is acceptable  Please advise       [phone]  Okay to leave detailed message

## 2020-02-17 NOTE — TELEPHONE ENCOUNTER
Both levels are fine  It depends on the time of the day when the levels are drawn as well as a number of other factors but both are within the appropriate reference range

## 2020-02-17 NOTE — TELEPHONE ENCOUNTER
Called  Patient back to make him aware of Dr Jolynn Lainez' message  Yifan Lovett, wife (ok per communication form), concerned about kidney function because she saw that creatinine was elevated  "Should we be concerned? "Please advise       Yifan Lovett requesting to speak with her regarding Kelvin Vasquez as she takes care of everything for him    [phone]  Papo Sheppard to leave detailed message

## 2020-02-17 NOTE — TELEPHONE ENCOUNTER
Spoke by phone with patient's wife  Reviewed the fact that the levetiracetam level is perfectly fine  He has a history of a mildly compromised creatinine and still does  She will have her  a discuss specifically the creatinine circumstance with the PCP

## 2020-08-08 DIAGNOSIS — G40.009 EPILEPSY WITH SEIZURES OF LOCALIZED ONSET (HCC): ICD-10-CM

## 2020-08-10 RX ORDER — LEVETIRACETAM 1000 MG/1
TABLET ORAL
Qty: 135 TABLET | Refills: 1 | Status: SHIPPED | OUTPATIENT
Start: 2020-08-10 | End: 2020-11-19 | Stop reason: SDUPTHER

## 2020-11-19 ENCOUNTER — OFFICE VISIT (OUTPATIENT)
Dept: NEUROLOGY | Facility: CLINIC | Age: 65
End: 2020-11-19
Payer: MEDICARE

## 2020-11-19 VITALS
SYSTOLIC BLOOD PRESSURE: 153 MMHG | TEMPERATURE: 97.3 F | RESPIRATION RATE: 18 BRPM | HEART RATE: 77 BPM | BODY MASS INDEX: 33.15 KG/M2 | DIASTOLIC BLOOD PRESSURE: 82 MMHG | WEIGHT: 236.8 LBS | HEIGHT: 71 IN

## 2020-11-19 DIAGNOSIS — G47.9 SLEEP DISTURBANCE: ICD-10-CM

## 2020-11-19 DIAGNOSIS — G40.009 LOCALIZATION-RELATED (FOCAL) (PARTIAL) IDIOPATHIC EPILEPSY AND EPILEPTIC SYNDROMES WITH SEIZURES OF LOCALIZED ONSET, NOT INTRACTABLE, WITHOUT STATUS EPILEPTICUS (HCC): Primary | ICD-10-CM

## 2020-11-19 DIAGNOSIS — G40.009 EPILEPSY WITH SEIZURES OF LOCALIZED ONSET (HCC): ICD-10-CM

## 2020-11-19 PROCEDURE — 99213 OFFICE O/P EST LOW 20 MIN: CPT | Performed by: PSYCHIATRY & NEUROLOGY

## 2020-11-19 RX ORDER — LEVETIRACETAM 1000 MG/1
TABLET ORAL
Qty: 135 TABLET | Refills: 1 | Status: SHIPPED | OUTPATIENT
Start: 2020-11-19 | End: 2021-05-25 | Stop reason: SDUPTHER

## 2020-11-19 RX ORDER — SILDENAFIL 100 MG/1
TABLET, FILM COATED ORAL
COMMUNITY
Start: 2020-09-03

## 2020-11-19 RX ORDER — GLIPIZIDE 10 MG/1
10 TABLET, FILM COATED, EXTENDED RELEASE ORAL
COMMUNITY
Start: 2020-09-16

## 2021-02-08 ENCOUNTER — TRANSCRIBE ORDERS (OUTPATIENT)
Dept: ADMINISTRATIVE | Facility: HOSPITAL | Age: 66
End: 2021-02-08

## 2021-02-08 DIAGNOSIS — N18.31 CHRONIC KIDNEY DISEASE (CKD) STAGE G3A/A1, MODERATELY DECREASED GLOMERULAR FILTRATION RATE (GFR) BETWEEN 45-59 ML/MIN/1.73 SQUARE METER AND ALBUMINURIA CREATININE RATIO LESS THAN 30 MG/G (HCC): Primary | ICD-10-CM

## 2021-03-10 DIAGNOSIS — Z23 ENCOUNTER FOR IMMUNIZATION: ICD-10-CM

## 2021-03-23 ENCOUNTER — HOSPITAL ENCOUNTER (OUTPATIENT)
Dept: ULTRASOUND IMAGING | Facility: HOSPITAL | Age: 66
Discharge: HOME | End: 2021-03-23
Payer: MEDICARE

## 2021-03-23 DIAGNOSIS — N18.31 CHRONIC KIDNEY DISEASE (CKD) STAGE G3A/A1, MODERATELY DECREASED GLOMERULAR FILTRATION RATE (GFR) BETWEEN 45-59 ML/MIN/1.73 SQUARE METER AND ALBUMINURIA CREATININE RATIO LESS THAN 30 MG/G (HCC): ICD-10-CM

## 2021-03-23 PROCEDURE — 76770 US EXAM ABDO BACK WALL COMP: CPT

## 2021-04-02 ENCOUNTER — TELEPHONE (OUTPATIENT)
Dept: UROLOGY | Facility: MEDICAL CENTER | Age: 66
End: 2021-04-02

## 2021-04-02 NOTE — TELEPHONE ENCOUNTER
Patient of Dr Jad Emanuel in [facility]    Patient's wife called stating patient was seen by Nephrologist and stated he had an Ultrasound done and he was to follow up with Urology  Patient has kidney cysts and wants to know how to proceed       Patient can be reached at [phone] (U)

## 2021-04-05 NOTE — TELEPHONE ENCOUNTER
Patient last seen by Dr Rob Lisa in 2018 for Prostate cancer  Never followed up  After molecular testing results  Patient has been seeing neurology and had u/s completed and patient had renal ultrasound completed which shows few b/l renal cysts   Please advise follow up appointment

## 2021-04-05 NOTE — TELEPHONE ENCOUNTER
Patient has not been seen since 2018 when he was diagnosed with  Intermediate risk prostate cancer  Per review of his Care everywhere documentation he had appears he underwent CyberKnife intervention in 2019  Patient will need any this consultation with physician for transfer of care prostate cancer  Renal cysts can be reviewed at this time

## 2021-04-05 NOTE — TELEPHONE ENCOUNTER
Called Jane huerta with [facility] in the background and scheduled with dr Kelly Hensley in [address] [place] [address] for 4/27 emaled directions to them

## 2021-04-27 ENCOUNTER — OFFICE VISIT (OUTPATIENT)
Dept: UROLOGY | Facility: MEDICAL CENTER | Age: 66
End: 2021-04-27
Payer: MEDICARE

## 2021-04-27 VITALS
SYSTOLIC BLOOD PRESSURE: 116 MMHG | BODY MASS INDEX: 30.8 KG/M2 | HEIGHT: 71 IN | DIASTOLIC BLOOD PRESSURE: 80 MMHG | HEART RATE: 65 BPM | WEIGHT: 220 LBS

## 2021-04-27 DIAGNOSIS — N28.1 RENAL CYST: ICD-10-CM

## 2021-04-27 DIAGNOSIS — Z85.46 HISTORY OF PROSTATE CANCER: Primary | ICD-10-CM

## 2021-04-27 LAB
SL AMB  POCT GLUCOSE, UA: NORMAL
SL AMB LEUKOCYTE ESTERASE,UA: NORMAL
SL AMB POCT BILIRUBIN,UA: NORMAL
SL AMB POCT BLOOD,UA: NORMAL
SL AMB POCT CLARITY,UA: CLEAR
SL AMB POCT COLOR,UA: YELLOW
SL AMB POCT KETONES,UA: NORMAL
SL AMB POCT NITRITE,UA: NORMAL
SL AMB POCT PH,UA: 6
SL AMB POCT SPECIFIC GRAVITY,UA: 1.01
SL AMB POCT URINE PROTEIN: NORMAL
SL AMB POCT UROBILINOGEN: 0.2

## 2021-04-27 PROCEDURE — 81003 URINALYSIS AUTO W/O SCOPE: CPT | Performed by: UROLOGY

## 2021-04-27 PROCEDURE — 99214 OFFICE O/P EST MOD 30 MIN: CPT | Performed by: UROLOGY

## 2021-04-27 RX ORDER — REPAGLINIDE 1 MG/1
TABLET ORAL
COMMUNITY
Start: 2021-03-12

## 2021-04-27 NOTE — PROGRESS NOTES
HISTORY:    History of prostate cancer, detected in 2018  Underwent CyberKnife at [facility]  PSA low ever since  PSA in February 2021 is 0 3    Renal cysts on recent ultrasound done as part of nephrology workup  Simple cyst on left, thinly septated cyst on right  Moderate renal insufficiency chronic  Prior info: The patient was recently discovered to have clinical stage T1c prostate cancer  His PSA was 10 1  His biopsy shows right Lexx Score 3+4= 7 disease  Metastatic work up was not done due to low risk             ASSESSMENT / PLAN:    1  Renal cysts were seen on CT scan in 2016 at [facility]  These do not need further evaluation at present, simple cyst, and one thin septation is no risk for cancer    He sees Radiation Oncology at [facility] every six months, just virtual visit    We will see him back in one year for a exam and rectal       The following portions of the patient's history were reviewed and updated as appropriate: allergies, current medications, past family history, past medical history, past social history, past surgical history and problem list     Review of Systems   All other systems reviewed and are negative  Objective:     Physical Exam  Constitutional:       General: He is not in acute distress  Appearance: He is well-developed  He is not diaphoretic  HENT:      Head: Normocephalic and atraumatic  Eyes:      General: No scleral icterus  Pulmonary:      Effort: Pulmonary effort is normal    Genitourinary:     Comments: Penis testes some atrophy but no nodules    Prostate minimally enlarged no nodules  Skin:     Coloration: Skin is not pale  Neurological:      Mental Status: He is alert and oriented to person, place, and time  Psychiatric:         Behavior: Behavior normal          Thought Content:  Thought content normal          Judgment: Judgment normal            No results found for: PSA]  No results found for: BUN  No results found for: CREATININE  No components found for: CBC      Patient Active Problem List   Diagnosis    Elevated PSA    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 localized onset, not intractable, without status epilepticus ([facility] [address] )    History of cerebral hemorrhage    [facility]    Sleep disturbance    History of prostate cancer        Diagnoses and all orders for this visit:    History of prostate cancer  -     POCT urine dip auto non-scope    Renal cyst    Other orders  -     repaglinide (PRANDIN) 1 mg tablet; TAKE 1 TABLET BY MOUTH 3 TIMES EVERY DAY 15 TO 30 MINUTES BEFORE MEALS           Patient ID: Abram Bonner is a 76 y o  male        Current Outpatient Medications:     aspirin 81 MG tablet, Take 81 mg by mouth, Disp: , Rfl:     atorvastatin (LIPITOR) 10 mg tablet, Take 10 mg by mouth daily, Disp: , Rfl: 3    Cholecalciferol (VITAMIN D3) 1000 units CAPS, i po daily, Disp: , Rfl:     cloNIDine (CATAPRES) 0 1 mg tablet, Take 0 1 mg by mouth 2 (two) times a day, Disp: , Rfl: 2    Cyanocobalamin (VITAMIN B-12 PO), Take 1,500 Units by mouth 3 (three) times a week , Disp: , Rfl:     fenofibrate (TRICOR) 54 MG tablet, Take 160 mg by mouth daily , Disp: , Rfl: 1    glipiZIDE (GLUCOTROL XL) 10 mg 24 hr tablet, Take 10 mg by mouth daily with breakfast, Disp: , Rfl:     levETIRAcetam (KEPPRA) 1000 MG tablet, By mouth take half tablet in the morning and 1 full tablet in the evening daily, Disp: 135 tablet, Rfl: 1    lisinopril (ZESTRIL) 20 mg tablet, Take 20 mg by mouth daily, Disp: , Rfl: 1    LORazepam (ATIVAN) 0 5 mg tablet, Take 0 5 mg by mouth every 8 (eight) hours, Disp: , Rfl:     metFORMIN (GLUCOPHAGE) 500 mg tablet, Take 500 mg by mouth 2 (two) times a day  , Disp: , Rfl:     repaglinide (PRANDIN) 1 mg tablet, TAKE 1 TABLET BY MOUTH 3 TIMES EVERY DAY 15 TO 30 MINUTES BEFORE MEALS, Disp: , Rfl:     sildenafil (VIAGRA) 100 mg tablet, As needed for ED , Disp: , Rfl:    thiamine (VITAMIN B1) 100 mg tablet, i po daily, Disp: , Rfl:     warfarin (COUMADIN) 1 mg tablet, take daily per warfarin protocol, Disp: , Rfl:     warfarin (COUMADIN) 4 mg tablet, 4 mg 4 1/2 tablets 3 times a week, Disp: , Rfl:     FLUoxetine (PROzac) 20 mg capsule, TAKE 1 CAPSULE BY ORAL ROUTE EVERY DAY IN THE MORNING, Disp: , Rfl: 2    warfarin (COUMADIN) 5 mg tablet, Take 5 mg by mouth daily Everyday, Disp: , Rfl: 1    Past Medical History:   Diagnosis Date    Diabetes ([facility] )     DVT (deep venous thrombosis) ([facility] ) 2016    Dyslipidemia     [facility]     Hypertension     [facility]     Multiple pulmonary emboli [facility] )     Seizures ([facility] ) 07/2017    Stroke ([place] [address] ) 12/14/2016       Past Surgical History:   Procedure Laterality Date    IVC FILTER INSERTION      IVC umbrella    PROSTATE BIOPSY Bilateral 09/06/2018    Dr Badillo Husbands        Social History

## 2021-05-12 ENCOUNTER — TELEPHONE (OUTPATIENT)
Dept: NEUROLOGY | Facility: CLINIC | Age: 66
End: 2021-05-12

## 2021-05-25 ENCOUNTER — OFFICE VISIT (OUTPATIENT)
Dept: NEUROLOGY | Facility: CLINIC | Age: 66
End: 2021-05-25
Payer: MEDICARE

## 2021-05-25 VITALS
DIASTOLIC BLOOD PRESSURE: 87 MMHG | WEIGHT: 225 LBS | HEIGHT: 71 IN | SYSTOLIC BLOOD PRESSURE: 127 MMHG | HEART RATE: 70 BPM | BODY MASS INDEX: 31.5 KG/M2

## 2021-05-25 DIAGNOSIS — G40.009 EPILEPSY WITH SEIZURES OF LOCALIZED ONSET (HCC): ICD-10-CM

## 2021-05-25 DIAGNOSIS — G40.009 LOCALIZATION-RELATED (FOCAL) (PARTIAL) IDIOPATHIC EPILEPSY AND EPILEPTIC SYNDROMES WITH SEIZURES OF LOCALIZED ONSET, NOT INTRACTABLE, WITHOUT STATUS EPILEPTICUS (HCC): Primary | ICD-10-CM

## 2021-05-25 PROCEDURE — 99215 OFFICE O/P EST HI 40 MIN: CPT | Performed by: NURSE PRACTITIONER

## 2021-05-25 RX ORDER — LEVETIRACETAM 1000 MG/1
TABLET ORAL
Qty: 135 TABLET | Refills: 3 | Status: SHIPPED | OUTPATIENT
Start: 2021-05-25 | End: 2021-11-18 | Stop reason: SDUPTHER

## 2021-05-25 NOTE — PATIENT INSTRUCTIONS
1  Continue current dose of levetiracetam 500 mg in the morning and 1000 mg at night  2  Call the office with any possible seizures or concerns  3  Follow up with Dr Hi Foster in 6 months

## 2021-05-25 NOTE — ASSESSMENT & PLAN NOTE
Patient with a single lifetime seizure after his right hemisphere intracerebral hemorrhage after venous thrombolysis  He has been on levetiracetam 500-1000 mg for many years without side effects or concerns  While he does have compromised kidney function, his levetiracetam level was 12 6 in December on his current dose  The patient and wife had questions regarding possibly coming off of levetiracetam  We discussed that given his prior stroke that was likely extensive given residual deficits, he would be at risk for seizures  We reviewed that if he would like to persue this we would need a normal EEG prior but could not guarantee that even with a normal EEG, he would not have another seizure  He does not want to have an EEG and is agreeable to remain on levetiracetam      On exam, he does have a left homonymous hemianopsia, brisk reflexes in left upper and lower extremities, slight left sided hemiparesis with abnormal gait  finger taps and FNF impaired on left  These are chronic findings per the patient and wife  MRI brain in 2017 did reveal "Chronic right occipital and central malacia and tissue loss, most likely secondary to an old infarct within the posterior cerebral artery vascular territory  Chronic microhemorrhage in the right parietal lobe  White matter disease " most recent EEG in 2017 with right hemisphere slowing, likely due to above  Patient will continue on levetiracetam 500 mg in the morning and 100 mg at night  Patient or wife will call the office with any possible seizures or concerns  Follow up with Dr Za Wood in 6 months

## 2021-05-25 NOTE — PROGRESS NOTES
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  He has been on levetiracetam 500-1000 mg for many years without side effects or concerns  While he does have compromised kidney function, his levetiracetam level was 12 6 in December on his current dose  The patient and wife had questions regarding possibly coming off of levetiracetam  We discussed that given his prior stroke that was likely extensive given residual deficits, he would be at risk for seizures  We reviewed that if he would like to persue this we would need a normal EEG prior but could not guarantee that even with a normal EEG, he would not have another seizure  He does not want to have an EEG and is agreeable to remain on levetiracetam      On exam, he does have a left homonymous hemianopsia, brisk reflexes in left upper and lower extremities, slight left sided hemiparesis with abnormal gait  finger taps and FNF impaired on left  These are chronic findings per the patient and wife  MRI brain in 2017 did reveal "Chronic right occipital and central malacia and tissue loss, most likely secondary to an old infarct within the posterior cerebral artery vascular territory  Chronic microhemorrhage in the right parietal lobe  White matter disease " most recent EEG in 2017 with right hemisphere slowing, likely due to above  Patient will continue on levetiracetam 500 mg in the morning and 100 mg at night  Patient or wife will call the office with any possible seizures or concerns  Follow up with Dr Dilip Jara in 6 months         He will Return for 6 months with Dr Sherry Guerrero  Subjective:    Sindy Lua is a 76 y o  male with localization related epilepsy, who is returning to Neurology office for follow up of his seizures  The patient also has a PMH of diabetes, hypertension, hyperlipidemia and past pulmonary emboli  He was last seen in the office by Dr Annabelle Garcia on 11/19/2020 who has since retired  It was noted at that time that the focus of his seizures was established by right hemisphere intra cerebral hemorrhage which occurred in the presence of venous thrombolysis December 2016  There were no reported seizures since July of 2017  He had continued on levetiracetam 500-1000 mg which he was tolerating well  Due to mild compromise in renal function, levetiracetam level was to be checked  Since his last visit, he has remained seizure free  Continues on levetiracetam 500 mg in the morning and 1000 mg at night  No side effects  No mood changes  He has lost about 25 lbs and had been able to come off of one of his BP medications  No longer snores  In the past he would have episodes of moaning and jerking in his sleep  This has not occurred in over 4 months since he has started losing weight  Patient notes that he would not have done the sleep study  Wife notes that all prior EEGs have been normal      Single event in 4 years  Stroke occurred 5 years ago in December  Had a huge clot in his leg and they were trying to break it down with heparin  They did a procedure and caused a bleed in his brain  He had a right IVC filter  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  Does not drive due to left eye vision deficit  He does have cysts on his kidneys  His father did have kidney disease  No staring spells  No evidence of nocturnal seizure  No concerning myoclonus  Current seizure medications:  - levetiracetam 500 mg in the morning and 1000 mg at night  Other medications as per Epic      Levetiracetam level 12/8/2020 12 8    Semiology:   - Single event 1 year after his stroke- On a hot day in July he went for a walk and went upstairs to change  When he came downstairs, was very confused and had both legs in one pant hole  He couldn't understand why he could not put his pants on  Wife called an ambulance in case it was a stroke  He was getting better once EMT came  Since he did not have a stroke on his scan they were believing it was likely a seizure and was started on levetiracetam      Special features:  - none    Prior AEDs:  - none    Epilepsy risk factors:  No seizures in childhood  No family history  + stroke  No brain surgeries  No brain infections  No brain tumors  Psychiatric history:  -none     Prior Workup:  - MRI/MRA brain w wo contrast 7/02/2017:  MR brain: There is no acute infarct, hemorrhage, mass or  extra-axial collection  There is tissue loss and encephalomalacia  in the right occipital lobe associated with expected dilatation of  the atrium and occipital horn of the right lateral ventricle and  chronic blood products in the parenchyma  This represents an old  infarct  There is cerebral atrophy  Multiple foci of increased T2  signal intensity are noted in the cerebral white matter, most  likely secondary to small vessel disease  The posterior fossa  structures are normal  There is a chronic microhemorrhage in the  right parietal lobe  There is no pathologic enhancement  MRA neck: There is no hemodynamically significant stenosis of the  internal carotid arteries by NASCET criteria  The normal distal  internal carotid artery diameters measure 5 mm  There is no  hemodynamically significant stenosis in the innominate, common  carotid or subclavian arteries  The origins of the vertebral  arteries are not well visualized  Otherwise, the vertebral arteries  are unremarkable  MRA head: There is no major branch occlusion  There are multiple  stenoses of the proximal and middle thirds of the basilar artery  and of the distal right vertebral artery   There is a diminution in  the number and caliber of the branches of the left middle cerebral  artery compared to the right  There are stenoses of the distal  branches of the right posterior cerebral artery  No aneurysm or  vascular malformation is detected  Impression:  1  MR brain: No acute infarct or hemorrhage  Chronic right  occipital and central malacia and tissue loss, most likely  secondary to an old infarct within the posterior cerebral artery  vascular territory  Chronic microhemorrhage in the right parietal  lobe  White matter disease  2  MRA neck: No hemodynamically significant stenoses of the  internal carotid arteries by NASCET criteria  Suboptimal  visualization of the origins of the vertebral arteries  Otherwise  unremarkable vertebral arteries  3  MRA head: No major branch occlusion  Stenoses of the proximal  and middle thirds of the basilar artery, the distal branches of the  left middle cerebral artery and the distal branches of the right  posterior cerebral artery           -EEG 3/27/2017      His history was also obtained from his wife, who was present at today's visit  I reviewed prior neurology ntoes, prior brain images, EEG reports, and recent blood work , as documented in [email or web address], and summarized above  Objective:    Blood pressure 127/87, pulse 70, height 5' 11" (1 803 m), weight 102 kg (225 lb)  Physical Exam  No apparent distress  Appears well nourished  Mood appropriate for situation     Neurologic Exam  Mental status- alert and oriented to person, place, and time  Speech appropriate for conversation  Good attention and knowledge  Cranial Nerves- PERRL, Left homonymous hemianopsia, EOMS normal, facial sensation and muscles symmetric, hearing intact bilaterally to finger rubs, tongue midline, palate rise symmetrical, shoulder shrug symmetrical     Motor- No pronator drift  5/5 strength in all extremities though slightly weaker in left upper and lower extremities  Moves all extremities freely  No tremor  Sensory-  Intact distally in all extremities to light touch  DTRs- 2+ and symmetric in all extremities  Slightly more brisk in left upper and lower extremity  Gait- Left hemiparetic gait  Unable to tandem gait  Negative rhomberg  Coordination- finger taps slightly slower on left  FNF intact on right  Slightly impaired on left  ROS:  Review of Systems   Constitutional: Negative  Negative for appetite change and fever  HENT: Negative  Negative for hearing loss, tinnitus, trouble swallowing and voice change  Eyes: Negative  Negative for photophobia and pain  Respiratory: Negative  Negative for shortness of breath  Cardiovascular: Negative  Negative for palpitations  Gastrointestinal: Negative  Negative for nausea and vomiting  Endocrine: Negative  Negative for cold intolerance  Genitourinary: Negative  Negative for dysuria, frequency and urgency  Musculoskeletal: Negative  Negative for myalgias and neck pain  Skin: Negative  Negative for rash  Neurological: Negative  Negative for dizziness, tremors, seizures, syncope, facial asymmetry, speech difficulty, weakness, light-headedness, numbness and headaches  Hematological: Negative  Does not bruise/bleed easily  Psychiatric/Behavioral: Negative  Negative for confusion, hallucinations and sleep disturbance  All other systems reviewed and are negative  ROS obtained by MA and reviewed by myself

## 2021-11-10 ENCOUNTER — TELEPHONE (OUTPATIENT)
Dept: NEUROLOGY | Facility: CLINIC | Age: 66
End: 2021-11-10

## 2021-11-18 ENCOUNTER — CONSULT (OUTPATIENT)
Dept: NEUROLOGY | Facility: CLINIC | Age: 66
End: 2021-11-18
Payer: MEDICARE

## 2021-11-18 VITALS
WEIGHT: 225 LBS | HEIGHT: 71 IN | SYSTOLIC BLOOD PRESSURE: 130 MMHG | BODY MASS INDEX: 31.5 KG/M2 | DIASTOLIC BLOOD PRESSURE: 89 MMHG | HEART RATE: 68 BPM

## 2021-11-18 DIAGNOSIS — G40.009 EPILEPSY WITH SEIZURES OF LOCALIZED ONSET (HCC): ICD-10-CM

## 2021-11-18 DIAGNOSIS — G40.009 LOCALIZATION-RELATED (FOCAL) (PARTIAL) IDIOPATHIC EPILEPSY AND EPILEPTIC SYNDROMES WITH SEIZURES OF LOCALIZED ONSET, NOT INTRACTABLE, WITHOUT STATUS EPILEPTICUS (HCC): Primary | ICD-10-CM

## 2021-11-18 PROCEDURE — 99215 OFFICE O/P EST HI 40 MIN: CPT | Performed by: PSYCHIATRY & NEUROLOGY

## 2021-11-18 RX ORDER — LEVETIRACETAM 1000 MG/1
TABLET ORAL
Qty: 135 TABLET | Refills: 3 | Status: SHIPPED | OUTPATIENT
Start: 2021-11-18

## 2022-06-09 ENCOUNTER — TELEPHONE (OUTPATIENT)
Dept: UROLOGY | Facility: AMBULATORY SURGERY CENTER | Age: 67
End: 2022-06-09

## 2022-06-09 ENCOUNTER — TELEPHONE (OUTPATIENT)
Dept: UROLOGY | Facility: MEDICAL CENTER | Age: 67
End: 2022-06-09

## 2022-06-09 DIAGNOSIS — N28.1 RENAL CYST: Primary | ICD-10-CM

## 2022-06-09 NOTE — TELEPHONE ENCOUNTER
Pt under care of Dr Emy Chu    Pt wife called and stated pt needs to have an US done before 1 yr follow up   Please have US script put in system and call back pt wife      Pt call [phone] Linnea(wife)

## 2022-06-10 NOTE — TELEPHONE ENCOUNTER
Patient given number for central scheduling  They will call and schedule at their convenience  Patient in recall for one year follow up  Told office will call with results once he has ultrasound

## 2022-06-16 ENCOUNTER — HOSPITAL ENCOUNTER (OUTPATIENT)
Dept: ULTRASOUND IMAGING | Facility: HOSPITAL | Age: 67
Discharge: HOME | End: 2022-06-16
Attending: UROLOGY
Payer: MEDICARE

## 2022-06-16 DIAGNOSIS — N28.1 RENAL CYST: ICD-10-CM

## 2022-06-16 PROCEDURE — 76770 US EXAM ABDO BACK WALL COMP: CPT

## 2022-06-22 NOTE — RESULT ENCOUNTER NOTE
Call pt, no change in bilateral renal cysts, no concern whatsoever  Also, he was treated for prostate cancer with radiation at [facility] years ago  His PSA in March 2022 was 0 5, that is perfectly fine  Not a significant change from 0 4 in August 2021  So, he does not need visit at present, unless he wants    I would recommend PSA in six months, phone report

## 2022-11-10 ENCOUNTER — TELEPHONE (OUTPATIENT)
Dept: NEUROLOGY | Facility: CLINIC | Age: 67
End: 2022-11-10

## 2022-11-14 NOTE — PROGRESS NOTES
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 localized onset, not intractable, without status epilepticus ([facility] [address] )  Overall, he continues to be seizure-free on levetiracetam monotherapy  We again discussed that because of his prior stroke and residual neurologic deficits, he would benefit from staying on seizure medications going forward  Because of this injury, he would be at a high risk of having additional seizures if not taking his seizure medications  Because his levetiracetam is at low dose and overall this is a safe medication, he likely would benefit from staying on this unchanged  --he will continue levetiracetam 500 mg in the morning and 1000 mg at night  If he would have additional seizures, this dose could be increased    History of cerebral hemorrhage  We discussed that his neurologic deficits with left-sided neglect, slight left-sided hemiparesis, mild changes with his cognition etc   are a result of his prior stroke  Fortunately these neurologic episodes have not been changing  At this point, I would expect that these will be permanent changes that he will continue to experience, but I would not expect any worsening unless he would have any new injury  It will be important for him to continue follow-up with his PCP to assure ongoing control of his blood pressure, cholesterol, blood glucose, etc  He will Return in about 1 year (around 11/16/2023)  Subjective:    HPI  Current seizure medications:  1  Levetiracetam 500 mg in the morning and 1000 mg at night  Other medications as per Epic  Since his last visit, he has not had any additional seizures and he denies any side effects   He continues to have some cognitive changes since his stroke and continues to have left sided neglect and difficulty with his vision  Overall, he has been doing well, with ongoing sequela of his prior stroke, but no new or changing symptoms  Prior Seizure Medications: none    His history was also obtained from his wife, who was present at today's visit  Objective:    Blood pressure 107/72, pulse 86, height 5' 11" (1 803 m), weight 99 8 kg (220 lb)  Physical Exam    Neurological Exam      ROS:    Review of Systems   Constitutional: Negative  Negative for appetite change and fever  HENT: Negative  Negative for hearing loss, tinnitus, trouble swallowing and voice change  Eyes: Negative  Negative for photophobia, pain and visual disturbance  Respiratory: Negative  Negative for shortness of breath  Cardiovascular: Negative  Negative for palpitations  Gastrointestinal: Negative  Negative for nausea and vomiting  Endocrine: Negative  Negative for cold intolerance  Genitourinary: Negative  Negative for dysuria, frequency and urgency  Musculoskeletal: Negative  Negative for gait problem, myalgias and neck pain  Skin: Negative  Negative for rash  Allergic/Immunologic: Negative  Neurological: Negative  Negative for dizziness, tremors, seizures, syncope, facial asymmetry, speech difficulty, weakness, light-headedness, numbness and headaches  Hematological: Negative  Does not bruise/bleed easily  Psychiatric/Behavioral: Negative  Negative for confusion, hallucinations and sleep disturbance  All other systems reviewed and are negative  I personally reviewed the ROS that was entered by the medical assistant    Voice recognition software was used in the generation of this note  There may be unintentional errors including grammatical errors, spelling errors, or pronoun errors

## 2022-11-16 ENCOUNTER — OFFICE VISIT (OUTPATIENT)
Dept: NEUROLOGY | Facility: CLINIC | Age: 67
End: 2022-11-16

## 2022-11-16 VITALS
DIASTOLIC BLOOD PRESSURE: 72 MMHG | SYSTOLIC BLOOD PRESSURE: 107 MMHG | BODY MASS INDEX: 30.8 KG/M2 | WEIGHT: 220 LBS | HEART RATE: 86 BPM | HEIGHT: 71 IN

## 2022-11-16 DIAGNOSIS — G40.009 LOCALIZATION-RELATED (FOCAL) (PARTIAL) IDIOPATHIC EPILEPSY AND EPILEPTIC SYNDROMES WITH SEIZURES OF LOCALIZED ONSET, NOT INTRACTABLE, WITHOUT STATUS EPILEPTICUS (HCC): Primary | ICD-10-CM

## 2022-11-16 DIAGNOSIS — G40.009 EPILEPSY WITH SEIZURES OF LOCALIZED ONSET (HCC): ICD-10-CM

## 2022-11-16 DIAGNOSIS — Z86.79 HISTORY OF CEREBRAL HEMORRHAGE: ICD-10-CM

## 2022-11-16 RX ORDER — LEVETIRACETAM 1000 MG/1
TABLET ORAL
Qty: 135 TABLET | Refills: 3 | Status: SHIPPED | OUTPATIENT
Start: 2022-11-16

## 2022-11-16 NOTE — ASSESSMENT & PLAN NOTE
We discussed that his neurologic deficits with left-sided neglect, slight left-sided hemiparesis, mild changes with his cognition etc   are a result of his prior stroke  Fortunately these neurologic episodes have not been changing  At this point, I would expect that these will be permanent changes that he will continue to experience, but I would not expect any worsening unless he would have any new injury  It will be important for him to continue follow-up with his PCP to assure ongoing control of his blood pressure, cholesterol, blood glucose, etc

## 2022-11-16 NOTE — ASSESSMENT & PLAN NOTE
Overall, he continues to be seizure-free on levetiracetam monotherapy  We again discussed that because of his prior stroke and residual neurologic deficits, he would benefit from staying on seizure medications going forward  Because of this injury, he would be at a high risk of having additional seizures if not taking his seizure medications  Because his levetiracetam is at low dose and overall this is a safe medication, he likely would benefit from staying on this unchanged  --he will continue levetiracetam 500 mg in the morning and 1000 mg at night    If he would have additional seizures, this dose could be increased

## 2023-01-18 ENCOUNTER — HOSPITAL ENCOUNTER (OUTPATIENT)
Dept: NON INVASIVE DIAGNOSTICS | Facility: HOSPITAL | Age: 68
Discharge: HOME | End: 2023-01-18

## 2023-01-18 DIAGNOSIS — Z86.73 PERSONAL HISTORY OF TRANSIENT ISCHEMIC ATTACK (TIA), AND CEREBRAL INFARCTION WITHOUT RESIDUAL DEFICITS: ICD-10-CM

## 2023-03-14 DIAGNOSIS — G40.009 EPILEPSY WITH SEIZURES OF LOCALIZED ONSET (HCC): ICD-10-CM

## 2023-03-15 NOTE — TELEPHONE ENCOUNTER
Fax received from [facility] pharmacy requesting refill of levetiracetam      Please sign if agreeable

## 2023-03-16 RX ORDER — LEVETIRACETAM 1000 MG/1
TABLET ORAL
Qty: 135 TABLET | Refills: 3 | Status: SHIPPED | OUTPATIENT
Start: 2023-03-16

## 2023-11-13 ENCOUNTER — TELEPHONE (OUTPATIENT)
Dept: NEUROLOGY | Facility: CLINIC | Age: 68
End: 2023-11-13

## 2023-11-20 ENCOUNTER — OFFICE VISIT (OUTPATIENT)
Dept: NEUROLOGY | Facility: CLINIC | Age: 68
End: 2023-11-20
Payer: MEDICARE

## 2023-11-20 VITALS
WEIGHT: 220 LBS | BODY MASS INDEX: 30.8 KG/M2 | DIASTOLIC BLOOD PRESSURE: 80 MMHG | HEART RATE: 73 BPM | SYSTOLIC BLOOD PRESSURE: 114 MMHG | TEMPERATURE: 98 F | HEIGHT: 71 IN

## 2023-11-20 DIAGNOSIS — G40.009 EPILEPSY WITH SEIZURES OF LOCALIZED ONSET (HCC): ICD-10-CM

## 2023-11-20 PROCEDURE — 99214 OFFICE O/P EST MOD 30 MIN: CPT | Performed by: PSYCHIATRY & NEUROLOGY

## 2023-11-20 RX ORDER — METOPROLOL SUCCINATE 25 MG/1
25 TABLET, EXTENDED RELEASE ORAL DAILY
COMMUNITY
Start: 2023-11-02

## 2023-11-20 RX ORDER — EMPAGLIFLOZIN 25 MG/1
25 TABLET, FILM COATED ORAL DAILY
COMMUNITY
Start: 2023-10-10

## 2023-11-20 RX ORDER — LEVETIRACETAM 1000 MG/1
TABLET ORAL
Qty: 135 TABLET | Refills: 3 | Status: SHIPPED | OUTPATIENT
Start: 2023-11-20

## 2023-11-20 NOTE — ASSESSMENT & PLAN NOTE
Overall, he continues to be seizure-free on levetiracetam monotherapy. The patient inquired about lowering his medication dose. We again discussed that because of his prior stroke and residual neurologic deficits, he would benefit from staying on seizure medications going forward. Because of this injury, he would be at a high risk of having additional seizures if not taking his seizure medications. Because his levetiracetam is at low dose and overall this is a safe medication, he likely would benefit from staying on this unchanged. - He will continue levetiracetam 500 mg in the morning and 1000 mg at night.

## 2023-11-20 NOTE — PROGRESS NOTES
Patient ID: Gisella Colbert is a 70 y.o. male with a history of a stroke, cerebral hemorrhage, and focal idiopathic epilepsy, who is returning to Neurology office for follow up of his seizure. Assessment/Plan:    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 localized onset, not intractable, without status epilepticus ([address])  Overall, he continues to be seizure-free on levetiracetam monotherapy. The patient inquired about lowering his medication dose. We again discussed that because of his prior stroke and residual neurologic deficits, he would benefit from staying on seizure medications going forward. Because of this injury, he would be at a high risk of having additional seizures if not taking his seizure medications. Because his levetiracetam is at low dose and overall this is a safe medication, he likely would benefit from staying on this unchanged. - He will continue levetiracetam 500 mg in the morning and 1000 mg at night. History of cerebral hemorrhage  We discussed that his neurologic deficits with left-sided neglect, slight left homonymous hemianopsia, mild changes with his cognition, and left fine motor skill deficit etc.  are a result of his prior stroke. Fortunately these neurologic episodes have not been changing. At this point, I would expect that these will be permanent changes that he will continue to experience, but I would not expect any worsening unless he would have any new injury. It will be important for him to continue follow-up with his PCP to assure ongoing control of his blood pressure, cholesterol, blood glucose, etc.. He will follow up in about 1 year. Subjective:    HPI    Current seizure medications:  1. Keppra 500 mg in the AM and 1000 mg at night. Other medications as per Epic. Since his last visit, he has not had any seizures.  The patient has had some cognitive changes since his stroke and continues to have left homonymous hemianopsia and left sided visual neglect. Sensation neglect has improved. The patient also continues to have left sided sensation deficits which have persisted since his stroke. Prior Seizure Medications: None    His history was also obtained from his wife, who was present at today's visit. Objective:    Blood pressure 114/80, pulse 73, temperature 98 °F (36.7 °C), height 5' 11" (1.803 m), weight 99.8 kg (220 lb). Physical Exam  Vitals reviewed. Eyes:      General: Lids are normal.      Extraocular Movements: Extraocular movements intact. Pupils: Pupils are equal, round, and reactive to light. Neurological:      Motor: Motor strength is normal.     Deep Tendon Reflexes: Reflexes are normal and symmetric. Neurological Exam  Mental Status  Awake, alert and oriented to person, place and time. Difficulty with math counting down by 7 from 100 and by 3 from 20. .    Cranial Nerves  CN II: Visual acuity is normal. Left homonymous hemianopsia. CN III, IV, VI: Extraocular movements intact bilaterally. Normal lids and orbits bilaterally. Pupils equal round and reactive to light bilaterally. CN V: Facial sensation is normal.  CN VII: Full and symmetric facial movement. CN VIII: Hearing is normal.  CN IX, X: Palate elevates symmetrically. Normal gag reflex. CN XI: Shoulder shrug strength is normal.  CN XII: Tongue midline without atrophy or fasciculations. Motor  Normal muscle bulk throughout. Normal muscle tone. Strength is 5/5 throughout all four extremities. Sensory  Light touch is normal in upper and lower extremities. Temperature abnormality: Decreased temperature sensation in left lower leg. Vibration is normal in upper and lower extremities. Vibration sense diminished left lower extremity. Reflexes  Deep tendon reflexes are 2+ and symmetric in all four extremities.         ROS:    Review of Systems   Constitutional:  Negative for activity change, appetite change, chills, diaphoresis, fatigue, fever and unexpected weight change. HENT:  Negative for congestion, ear discharge, ear pain, facial swelling and hearing loss. Respiratory:  Negative for shortness of breath and wheezing. Gastrointestinal:  Negative for blood in stool. Genitourinary:  Negative for decreased urine volume, difficulty urinating, dysuria and hematuria. Musculoskeletal:  Negative for arthralgias, joint swelling and myalgias. Skin:  Negative for color change and rash. Neurological:  Negative for dizziness, seizures, speech difficulty and numbness. Psychiatric/Behavioral:  Negative for agitation and confusion. I personally reviewed the ROS that was entered by the medical assistant in a separate note. Voice recognition software was used in the generation of this note. There may be unintentional errors including grammatical errors, spelling errors, or pronoun errors.

## 2023-11-20 NOTE — ASSESSMENT & PLAN NOTE
We discussed that his neurologic deficits with left-sided neglect, slight left homonymous hemianopsia, mild changes with his cognition, and left fine motor skill deficit etc.  are a result of his prior stroke. Fortunately these neurologic episodes have not been changing. At this point, I would expect that these will be permanent changes that he will continue to experience, but I would not expect any worsening unless he would have any new injury. It will be important for him to continue follow-up with his PCP to assure ongoing control of his blood pressure, cholesterol, blood glucose, etc..

## 2024-01-22 ENCOUNTER — EMERGENCY (EMERGENCY)
Facility: HOSPITAL | Age: 69
LOS: 0 days | Discharge: HOME | End: 2024-01-22
Attending: EMERGENCY MEDICINE
Payer: MEDICARE

## 2024-01-22 VITALS
WEIGHT: 205.03 LBS | DIASTOLIC BLOOD PRESSURE: 88 MMHG | HEIGHT: 71 IN | HEART RATE: 76 BPM | OXYGEN SATURATION: 98 % | SYSTOLIC BLOOD PRESSURE: 180 MMHG | RESPIRATION RATE: 19 BRPM | TEMPERATURE: 98 F

## 2024-01-22 VITALS
TEMPERATURE: 98 F | RESPIRATION RATE: 18 BRPM | SYSTOLIC BLOOD PRESSURE: 124 MMHG | HEART RATE: 90 BPM | DIASTOLIC BLOOD PRESSURE: 84 MMHG | OXYGEN SATURATION: 98 %

## 2024-01-22 DIAGNOSIS — S06.9XAA UNSPECIFIED INTRACRANIAL INJURY WITH LOSS OF CONSCIOUSNESS STATUS UNKNOWN, INITIAL ENCOUNTER: ICD-10-CM

## 2024-01-22 DIAGNOSIS — N17.9 ACUTE KIDNEY FAILURE, UNSPECIFIED: ICD-10-CM
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
## 2024-01-22 DIAGNOSIS — R79.89 OTHER SPECIFIED ABNORMAL FINDINGS OF BLOOD CHEMISTRY: ICD-10-CM

## 2024-01-22 DIAGNOSIS — S22.31XA FRACTURE OF ONE RIB, RIGHT SIDE, INITIAL ENCOUNTER FOR CLOSED FRACTURE: ICD-10-CM
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
## 2024-01-22 DIAGNOSIS — F10.120 ALCOHOL ABUSE WITH INTOXICATION, UNCOMPLICATED: ICD-10-CM

## 2024-01-22 DIAGNOSIS — S22.32XA FRACTURE OF ONE RIB, LEFT SIDE, INITIAL ENCOUNTER FOR CLOSED FRACTURE: ICD-10-CM

## 2024-01-22 DIAGNOSIS — Z79.01 LONG TERM (CURRENT) USE OF ANTICOAGULANTS: ICD-10-CM

## 2024-01-22 DIAGNOSIS — E86.0 DEHYDRATION: ICD-10-CM
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
## 2024-01-22 LAB
ALBUMIN SERPL ELPH-MCNC: 4.1 G/DL — SIGNIFICANT CHANGE UP (ref 3.3–5)
ALP SERPL-CCNC: 42 U/L — SIGNIFICANT CHANGE UP (ref 40–120)
ALT FLD-CCNC: 44 U/L — SIGNIFICANT CHANGE UP (ref 12–78)
ANION GAP SERPL CALC-SCNC: 7 MMOL/L — SIGNIFICANT CHANGE UP (ref 5–17)
APPEARANCE UR: CLEAR — SIGNIFICANT CHANGE UP
APTT BLD: 41.2 SEC — HIGH (ref 24.5–35.6)
AST SERPL-CCNC: 67 U/L — HIGH (ref 15–37)
BASOPHILS # BLD AUTO: 0.04 K/UL — SIGNIFICANT CHANGE UP (ref 0–0.2)
BASOPHILS NFR BLD AUTO: 0.4 % — SIGNIFICANT CHANGE UP (ref 0–2)
BILIRUB SERPL-MCNC: 0.5 MG/DL — SIGNIFICANT CHANGE UP (ref 0.2–1.2)
BILIRUB UR-MCNC: NEGATIVE — SIGNIFICANT CHANGE UP
BUN SERPL-MCNC: 26 MG/DL — HIGH (ref 7–23)
CALCIUM SERPL-MCNC: 9.8 MG/DL — SIGNIFICANT CHANGE UP (ref 8.5–10.1)
CHLORIDE SERPL-SCNC: 106 MMOL/L — SIGNIFICANT CHANGE UP (ref 96–108)
CO2 SERPL-SCNC: 24 MMOL/L — SIGNIFICANT CHANGE UP (ref 22–31)
COLOR SPEC: YELLOW — SIGNIFICANT CHANGE UP
CREAT SERPL-MCNC: 1.81 MG/DL — HIGH (ref 0.5–1.3)
DIFF PNL FLD: NEGATIVE — SIGNIFICANT CHANGE UP
EGFR: 40 ML/MIN/1.73M2 — LOW
EOSINOPHIL # BLD AUTO: 0.01 K/UL — SIGNIFICANT CHANGE UP (ref 0–0.5)
EOSINOPHIL NFR BLD AUTO: 0.1 % — SIGNIFICANT CHANGE UP (ref 0–6)
ETHANOL SERPL-MCNC: 89 MG/DL — HIGH (ref 0–10)
GLUCOSE SERPL-MCNC: 191 MG/DL — HIGH (ref 70–99)
GLUCOSE UR QL: >=1000 MG/DL
HCT VFR BLD CALC: 49.1 % — SIGNIFICANT CHANGE UP (ref 39–50)
HGB BLD-MCNC: 16.8 G/DL — SIGNIFICANT CHANGE UP (ref 13–17)
IMM GRANULOCYTES NFR BLD AUTO: 0.7 % — SIGNIFICANT CHANGE UP (ref 0–0.9)
INR BLD: 2.77 RATIO — HIGH (ref 0.85–1.18)
KETONES UR-MCNC: NEGATIVE MG/DL — SIGNIFICANT CHANGE UP
LACTATE SERPL-SCNC: 1.5 MMOL/L — SIGNIFICANT CHANGE UP (ref 0.7–2)
LACTATE SERPL-SCNC: 3.8 MMOL/L — HIGH (ref 0.7–2)
LEUKOCYTE ESTERASE UR-ACNC: NEGATIVE — SIGNIFICANT CHANGE UP
LIDOCAIN IGE QN: 37 U/L — SIGNIFICANT CHANGE UP (ref 13–75)
LYMPHOCYTES # BLD AUTO: 1.5 K/UL — SIGNIFICANT CHANGE UP (ref 1–3.3)
LYMPHOCYTES # BLD AUTO: 13.1 % — SIGNIFICANT CHANGE UP (ref 13–44)
MCHC RBC-ENTMCNC: 30.5 PG — SIGNIFICANT CHANGE UP (ref 27–34)
MCHC RBC-ENTMCNC: 34.2 GM/DL — SIGNIFICANT CHANGE UP (ref 32–36)
MCV RBC AUTO: 89.3 FL — SIGNIFICANT CHANGE UP (ref 80–100)
MONOCYTES # BLD AUTO: 0.65 K/UL — SIGNIFICANT CHANGE UP (ref 0–0.9)
MONOCYTES NFR BLD AUTO: 5.7 % — SIGNIFICANT CHANGE UP (ref 2–14)
NEUTROPHILS # BLD AUTO: 9.14 K/UL — HIGH (ref 1.8–7.4)
NEUTROPHILS NFR BLD AUTO: 80 % — HIGH (ref 43–77)
NITRITE UR-MCNC: NEGATIVE — SIGNIFICANT CHANGE UP
PH UR: 6 — SIGNIFICANT CHANGE UP (ref 5–8)
PLATELET # BLD AUTO: 207 K/UL — SIGNIFICANT CHANGE UP (ref 150–400)
POTASSIUM SERPL-MCNC: 3.6 MMOL/L — SIGNIFICANT CHANGE UP (ref 3.5–5.3)
POTASSIUM SERPL-SCNC: 3.6 MMOL/L — SIGNIFICANT CHANGE UP (ref 3.5–5.3)
PROT SERPL-MCNC: 7.8 GM/DL — SIGNIFICANT CHANGE UP (ref 6–8.3)
PROT UR-MCNC: NEGATIVE MG/DL — SIGNIFICANT CHANGE UP
PROTHROM AB SERPL-ACNC: 30.4 SEC — HIGH (ref 9.5–13)
RBC # BLD: 5.5 M/UL — SIGNIFICANT CHANGE UP (ref 4.2–5.8)
RBC # FLD: 13 % — SIGNIFICANT CHANGE UP (ref 10.3–14.5)
SODIUM SERPL-SCNC: 137 MMOL/L — SIGNIFICANT CHANGE UP (ref 135–145)
SP GR SPEC: 1.01 — SIGNIFICANT CHANGE UP (ref 1–1.03)
UROBILINOGEN FLD QL: 0.2 MG/DL — SIGNIFICANT CHANGE UP (ref 0.2–1)
WBC # BLD: 11.42 K/UL — HIGH (ref 3.8–10.5)
WBC # FLD AUTO: 11.42 K/UL — HIGH (ref 3.8–10.5)

## 2024-01-22 PROCEDURE — 85025 COMPLETE CBC W/AUTO DIFF WBC: CPT

## 2024-01-22 PROCEDURE — 81003 URINALYSIS AUTO W/O SCOPE: CPT

## 2024-01-22 PROCEDURE — 83605 ASSAY OF LACTIC ACID: CPT

## 2024-01-22 PROCEDURE — 99285 EMERGENCY DEPT VISIT HI MDM: CPT | Mod: 25

## 2024-01-22 PROCEDURE — 83690 ASSAY OF LIPASE: CPT

## 2024-01-22 PROCEDURE — 71260 CT THORAX DX C+: CPT | Mod: MA

## 2024-01-22 PROCEDURE — 72125 CT NECK SPINE W/O DYE: CPT | Mod: MA

## 2024-01-22 PROCEDURE — 72125 CT NECK SPINE W/O DYE: CPT | Mod: 26,MA

## 2024-01-22 PROCEDURE — 80307 DRUG TEST PRSMV CHEM ANLYZR: CPT

## 2024-01-22 PROCEDURE — 36415 COLL VENOUS BLD VENIPUNCTURE: CPT

## 2024-01-22 PROCEDURE — 93010 ELECTROCARDIOGRAM REPORT: CPT

## 2024-01-22 PROCEDURE — 85610 PROTHROMBIN TIME: CPT

## 2024-01-22 PROCEDURE — 85730 THROMBOPLASTIN TIME PARTIAL: CPT

## 2024-01-22 PROCEDURE — 70450 CT HEAD/BRAIN W/O DYE: CPT | Mod: 26,MA

## 2024-01-22 PROCEDURE — 74177 CT ABD & PELVIS W/CONTRAST: CPT | Mod: 26,MA

## 2024-01-22 PROCEDURE — 93005 ELECTROCARDIOGRAM TRACING: CPT

## 2024-01-22 PROCEDURE — 70450 CT HEAD/BRAIN W/O DYE: CPT | Mod: MA

## 2024-01-22 PROCEDURE — 99284 EMERGENCY DEPT VISIT MOD MDM: CPT

## 2024-01-22 PROCEDURE — 80053 COMPREHEN METABOLIC PANEL: CPT

## 2024-01-22 PROCEDURE — 71260 CT THORAX DX C+: CPT | Mod: 26,MA

## 2024-01-22 PROCEDURE — 74177 CT ABD & PELVIS W/CONTRAST: CPT | Mod: MA

## 2024-01-22 RX ORDER — PREGABALIN 225 MG/1
3 CAPSULE ORAL
Refills: 0 | DISCHARGE

## 2024-01-22 RX ORDER — EMPAGLIFLOZIN 10 MG/1
1 TABLET, FILM COATED ORAL
Refills: 0 | DISCHARGE

## 2024-01-22 RX ORDER — THIAMINE MONONITRATE (VIT B1) 100 MG
1 TABLET ORAL
Refills: 0 | DISCHARGE

## 2024-01-22 RX ORDER — SODIUM CHLORIDE 9 MG/ML
1000 INJECTION INTRAMUSCULAR; INTRAVENOUS; SUBCUTANEOUS ONCE
Refills: 0 | Status: COMPLETED | OUTPATIENT
Start: 2024-01-22 | End: 2024-01-22

## 2024-01-22 RX ORDER — ATORVASTATIN CALCIUM 80 MG/1
1 TABLET, FILM COATED ORAL
Refills: 0 | DISCHARGE

## 2024-01-22 RX ORDER — LISINOPRIL 2.5 MG/1
1 TABLET ORAL
Refills: 0 | DISCHARGE

## 2024-01-22 RX ORDER — CHOLECALCIFEROL (VITAMIN D3) 125 MCG
1 CAPSULE ORAL
Refills: 0 | DISCHARGE

## 2024-01-22 RX ORDER — WARFARIN SODIUM 2.5 MG/1
1 TABLET ORAL
Refills: 0 | DISCHARGE

## 2024-01-22 RX ORDER — LEVETIRACETAM 250 MG/1
1 TABLET, FILM COATED ORAL
Refills: 0 | DISCHARGE

## 2024-01-22 RX ORDER — FENOFIBRATE,MICRONIZED 130 MG
1 CAPSULE ORAL
Refills: 0 | DISCHARGE

## 2024-01-22 RX ORDER — METOPROLOL TARTRATE 50 MG
1 TABLET ORAL
Refills: 0 | DISCHARGE

## 2024-01-22 RX ORDER — SODIUM CHLORIDE 9 MG/ML
250 INJECTION INTRAMUSCULAR; INTRAVENOUS; SUBCUTANEOUS ONCE
Refills: 0 | Status: COMPLETED | OUTPATIENT
Start: 2024-01-22 | End: 2024-01-22

## 2024-01-22 RX ADMIN — SODIUM CHLORIDE 1000 MILLILITER(S): 9 INJECTION INTRAMUSCULAR; INTRAVENOUS; SUBCUTANEOUS at 18:00

## 2024-01-22 RX ADMIN — SODIUM CHLORIDE 1000 MILLILITER(S): 9 INJECTION INTRAMUSCULAR; INTRAVENOUS; SUBCUTANEOUS at 20:16

## 2024-01-22 RX ADMIN — SODIUM CHLORIDE 250 MILLILITER(S): 9 INJECTION INTRAMUSCULAR; INTRAVENOUS; SUBCUTANEOUS at 18:05

## 2024-01-22 NOTE — ED PROVIDER NOTE - CLINICAL SUMMARY MEDICAL DECISION MAKING FREE TEXT BOX
67 y/o M with mechanical fall possibly secondary to alcohol use. On Coumadin. Will get pan scan to r/o abd injury, labs, reassess closely.

## 2024-01-22 NOTE — ED ADULT TRIAGE NOTE - CHIEF COMPLAINT QUOTE
patient brought in by EMS s/p trip and fall.  patient fell in a garage, hit face on ground.  + LOC.  denies pain.  on coumadin.  ekg in progress.  neuro alert initiated.

## 2024-01-22 NOTE — ED ADULT NURSE REASSESSMENT NOTE - NS ED NURSE REASSESS COMMENT FT1
as per request of MD foy- ambulation trial. pt was ambulated with 2 person stand by assist. pt ambulated with steady gait, pt denies feeling lightheadedness/dizziness. pt reports feeling steady on his feet.

## 2024-01-22 NOTE — ED ADULT NURSE REASSESSMENT NOTE - NS ED NURSE REASSESS COMMENT FT1
received pt from day RN bruna campa. pt resting comfortably at this time. no signs of distress noted. VSS as charted. no further complaints or discomforts reported at this time. safety and comfort maintained.

## 2024-01-22 NOTE — ED ADULT NURSE NOTE - NSFALLRISKINTERV_ED_ALL_ED
Assistance OOB with selected safe patient handling equipment if applicable/Assistance with ambulation/Communicate fall risk and risk factors to all staff, patient, and family/Monitor gait and stability/Monitor for mental status changes and reorient to person, place, and time, as needed/Provide visual cue: yellow wristband, yellow gown, etc/Reinforce activity limits and safety measures with patient and family/Toileting schedule using arm’s reach rule for commode and bathroom/Use of alarms - bed, stretcher, chair and/or video monitoring/Call bell, personal items and telephone in reach/Instruct patient to call for assistance before getting out of bed/chair/stretcher/Non-slip footwear applied when patient is off stretcher/[place] to call system/Physically safe environment - no spills, clutter or unnecessary equipment/Purposeful Proactive Rounding/Room/bathroom lighting operational, light cord in reach

## 2024-01-22 NOTE — ED PROVIDER NOTE - PATIENT PORTAL LINK FT
You can access the FollowMyHealth Patient Portal offered by [facility] by registering at the following website: [email or web address]. By joining [employer]’s FollowMyHealth portal, you will also be able to view your health information using other applications (apps) compatible with our system.

## 2024-01-22 NOTE — ED PROVIDER NOTE - PHYSICAL EXAMINATION
GENERAL: A&Ox4, mildly intoxicated appearing, no acute distress  HEENT: NCAT, EOMI, oral mucosa moist, normal conjunctiva  RESP: CTAB, no respiratory distress, no wheezes/rhonchi/rales, speaking in full sentences  CV: RRR, no murmurs/rubs/gallops  ABDOMEN: soft, non-tender, non-distended, no guarding, no rebound tenderness  MSK: no visible deformities, c-collar in place, neck non tender to palpation  NEURO: no focal sensory or motor deficits, CN 2-12 grossly intact  SKIN: warm, normal color, well perfused, no rash  PSYCH: normal affect GENERAL:  mildly intoxicated appearing, no acute distress  HEENT: NCAT, EOMI, oral mucosa moist, normal conjunctiva  RESP: CTAB, no respiratory distress, no wheezes/rhonchi/rales, speaking in full sentences  CV: RRR, no murmurs/rubs/gallops  ABDOMEN: soft, non-tender, non-distended, no guarding, no rebound tenderness  MSK: no visible deformities, c-collar in place, neck non tender to palpation  NEURO: no focal sensory or motor deficits  SKIN: warm, normal color, well perfused, no rash  PSYCH: normal affect

## 2024-01-22 NOTE — ED ADULT NURSE NOTE - OBJECTIVE STATEMENT
Patient presents to the ER with complaints of mechanical trip and fall in the garage sustaining head strike on the ground. Patient had + LOC, on coumadin, denies pain at this time. Patient awake and alert, c-collar in place.

## 2024-01-22 NOTE — ED PROVIDER NOTE - OBJECTIVE STATEMENT
69 y/o M presents to the ED BIBEMS s/p trip and fall. Pt fell in a garage and hit his face on the ground. + LOC. Denies pain. Pt on Coumadin. NA initiated. Pt was drinking today.

## 2024-01-22 NOTE — ED PROVIDER NOTE - NSFOLLOWUPINSTRUCTIONS_ED_ALL_ED_FT
Rib Fracture    Take Tylenol 650-1000 mg every 4-6 hours as needed for pain. Do not exceed 4,000 mg in a 24 hour period. Take Ibuprofen 600-800 mg every 8 hours as needed for moderate pain -- take with food.     Perform incentive spirometry every hour as directed.    A rib fracture is a break or crack in one of the bones of the ribs. The ribs are long, curved bones that wrap around your chest and attach to your spine and your breastbone. The ribs protect your heart, lungs, and other organs in the chest.    A broken or cracked rib is often painful but is not usually serious. Most rib fractures heal on their own over time. However, rib fractures can be more serious if multiple ribs are broken or if broken ribs move out of place and push against other structures or organs.    What are the causes?  This condition is caused by:  Repetitive movements with high force, such as pitching a baseball or having very bad coughing spells.  A direct hit the chest, such as a sports injury, a car crash, or a fall.  Cancer that has spread to the bones, which can weaken bones and cause them to break.  What are the signs or symptoms?  Symptoms of this condition include:  Pain when you breathe in or cough.  Pain when someone presses on the injured area.  Feeling short of breath.  How is this diagnosed?  This condition is diagnosed with a physical exam and medical history. You may also have imaging tests, such as:  Chest X-ray.  CT scan.  MRI.  Bone scan.  Chest ultrasound.  How is this treated?  Treatment for this condition depends on the severity of the fracture. Most rib fractures usually heal on their own in 1–3 months. Healing may take longer if you have a cough or if you do activities that make the injury worse. While you heal, you may be given medicines to control the pain. You will also be taught deep breathing exercises.    Severe injuries may require hospitalization or surgery.    Follow these instructions at home:  Managing pain, stiffness, and swelling    If directed, put ice on the injured area. To do this:  Put ice in a plastic bag.  Place a towel between your skin and the bag.  Leave the ice on for 20 minutes, 2–3 times a day.  Remove the ice if your skin turns bright red. This is very important. If you cannot feel pain, heat, or cold, you have a greater risk of damage to the area.  Take over-the-counter and prescription medicines only as told by your health care provider.  Activity    Avoid doing activities or movements that cause pain. Be careful during activities and avoid bumping the injured rib.  Slowly increase your activity as told by your health care provider.  General instructions    Do deep breathing exercises as told by your health care provider. This helps prevent pneumonia, which is a common complication of a broken rib. Your health care provider may instruct you to:  Take deep breaths several times a day.  Try to cough several times a day, holding a pillow against the injured area.  Use a device called incentive spirometer to practice deep breathing several times a day.  Drink enough fluid to keep your urine pale yellow.  Do not wear a rib belt or binder. These restrict breathing, which can lead to pneumonia.  Keep all follow-up visits. This is important.  Contact a health care provider if:  You have a fever.  Get help right away if:  You have difficulty breathing or you are short of breath.  You develop a cough that does not stop, or you cough up thick or bloody sputum.  You have nausea, vomiting, or pain in your abdomen.  Your pain gets worse and medicine does not help.  These symptoms may represent a serious problem that is an emergency. Do not wait to see if the symptoms will go away. Get medical help right away. Call your local emergency services (911 in the [place] Do not drive yourself to the hospital.    Summary  A rib fracture is a break or crack in one of the bones of the ribs.  A broken or cracked rib is often painful but is not usually serious.  Most rib fractures heal on their own over time.  Treatment for this condition depends on the severity of the fracture.  Avoid doing any activities or movements that cause pain.  This information is not intended to replace advice given to you by your health care provider. Make sure you discuss any questions you have with your health care provider.    -------------------  Abuse of Alcohol    WHAT YOU NEED TO KNOW:    What is alcohol abuse? Alcohol abuse means you drink more than the recommended daily or weekly limits. You may be drinking alcohol regularly or drinking large amounts in a short period of time (binge drinking). You continue to drink even though it causes legal, work, or relationship problems.    What do I need to know about recommended alcohol limits? One drink is defined as 12 ounces (oz) of beer, 5 oz of wine, or 1.5 oz of liquor such as whiskey.    Men 21 to 64 years should limit alcohol to 2 drinks a day. Do not have more than 4 drinks in 1 day or more than 14 in 1 week.    All women, and men 65 or older should limit alcohol to 1 drink in a day. Do not have more than 3 drinks in 1 day or more than 7 in 1 week. Do not drink alcohol if you are pregnant.  What are the signs and symptoms of alcohol abuse?    Loss of interest in activities, work, and school    Hiding alcohol, or drinking in private    Depression, or guilt about drinking    Constant thoughts about alcohol    Drinking in the morning to relieve the effects of a hangover    Not being able to control the amount you drink    Restlessness, or erratic and violent behavior  What health problems can alcohol abuse cause?    Cancer in your liver, pancreas, stomach, colon, kidney, or breast    Stroke or a heart attack    Liver, kidney, or lung disease    Blackouts, memory loss, brain damage, or dementia    Diabetes, immune system problems, or thiamine (vitamin B1) deficiency    Problems for you and your baby if you drink while pregnant  How is alcohol abuse treated? Treatment can help you understand the reasons you abuse alcohol. Counselors and therapists provide you with support and help you find ways to cope instead of drinking. You may need inpatient treatment to provide a controlled environment. You may need outpatient treatment after your inpatient treatment is complete.    Detoxification (detox) is a program used to flush alcohol from your body. During detox, medicines are given to help prevent withdrawal symptoms when you stop drinking alcohol.    Brief intervention therapy helps you think about your alcohol use differently. A healthcare provider helps you set goals to decrease the amount of alcohol you drink. Therapy may continue after you leave the hospital.    Vitamin supplements such as B1 may be needed. Alcohol can make it hard for your body to absorb enough vitamin B1. You may be given vitamin B1 if you have low levels. It is also given to prevent brain damage from alcohol use.  What can I do to manage my alcohol use?    Work with healthcare providers on goals to drink less. This can help prevent health problems. For example, you may start by planning your weekly alcohol use. It will be easier to have fewer drinks if you plan ahead.    Have food when you drink alcohol. Food will prevent alcohol from getting into your system too quickly. Eat before you have your first alcohol drink.    Time your drinks carefully. Have no more than 1 drink in an hour. Have a liquid such as water, coffee, or a soft drink between alcohol drinks.    Do not drive if you have had alcohol. Plan ahead so you have a safe ride home. Make sure someone who has not been drinking can help you get home safely. Plan to use a taxi or other ride service, if needed.    Do not drink alcohol if you are taking medicine. Alcohol is dangerous when you combine it with certain medicines, such as acetaminophen or blood pressure medicine. Talk to your healthcare provider about all the medicines you currently take.  Where can I find support and more information?    Alcoholics Anonymous  Web Address: http://www.aa.org  Substance Abuse and Mental Health Services Administration  [address]  [place] [address]  Web Address: http://www.[facility].gov  Call your local emergency number (911 in the [place]), or have someone call if:    You have sudden chest pain or trouble breathing.    You want to harm yourself or others.    You have a seizure.  When should I seek care immediately?    You cannot stop vomiting or you vomit blood.    When should I call my doctor?    You have hallucinations (you see or hear things that are not real).    You have questions or concerns about your condition or care.  CARE AGREEMENT:    You have the right to help plan your care. Learn about your health condition and how it may be treated. Discuss treatment options with your healthcare providers to decide what care you want to receive. You always have the right to refuse treatment.

## 2024-01-22 NOTE — ED PROVIDER NOTE - PROGRESS NOTE DETAILS
Gaines, DO: Patient signed out to me by Dr. Trujillo, currently awaiting repeat lactate and can be discharged if improved.  Patient ambulatory in the ED, is a little bit shaky but is walking with a steady gait without assistance.  Labs reviewed, does have elevated creatinine likely in the setting of dehydration, alcohol 89, CT with single rib fracture, no pneumothorax, no hemothorax.  Patient given incentive spirometer and instructed on its use, advised to use every hour.  Pain is well-controlled at this time.  Family feels comfortable taking patient home, educated regarding alcohol reduction at home.  Discussed return precautions and all questions answered. Pt in agreement w/ plan. Patient demonstrates decision making capacity, NAD, VSS. Stable for d/c.

## 2024-01-22 NOTE — ED PROVIDER NOTE - CARE PLAN
1 Principal Discharge DX:	Fracture of single rib  Secondary Diagnosis:	KIMMIE (acute kidney injury)  Secondary Diagnosis:	Dehydration  Secondary Diagnosis:	Alcohol abuse

## 2024-02-23 NOTE — ED ADULT NURSE NOTE - NSSUHOSCREENINGYN_ED_ALL_ED
Yes - the patient is able to be screened General Sunscreen Counseling: I recommended a broad spectrum sunscreen with a SPF of 50 or higher.  I explained that SPF 50 sunscreens block approximately 98 percent of the sun's harmful rays.  Sunscreens should be applied at least 15 minutes prior to expected sun exposure and then every 2 hours after that as long as sun exposure continues. If swimming or exercising sunscreen should be reapplied every 45 minutes to an hour after getting wet or sweating.  One ounce, or the equivalent of a shot glass full of sunscreen, is adequate to protect the skin not covered by a bathing suit. I also recommended a lip balm with a sunscreen as well. Sun protective clothing can be used in lieu of sunscreen but must be worn the entire time you are exposed to the sun's rays. Detail Level: Detailed

## 2024-05-12 ENCOUNTER — OFFICE VISIT (OUTPATIENT)
Dept: URGENT CARE | Facility: CLINIC | Age: 69
End: 2024-05-12

## 2024-05-12 ENCOUNTER — HOSPITAL ENCOUNTER (EMERGENCY)
Facility: HOSPITAL | Age: 69
Discharge: HOME | End: 2024-05-12
Attending: EMERGENCY MEDICINE
Payer: MEDICARE

## 2024-05-12 ENCOUNTER — APPOINTMENT (EMERGENCY)
Dept: CT IMAGING | Facility: HOSPITAL | Age: 69
End: 2024-05-12
Payer: MEDICARE

## 2024-05-12 ENCOUNTER — APPOINTMENT (EMERGENCY)
Dept: RADIOLOGY | Facility: HOSPITAL | Age: 69
End: 2024-05-12
Payer: MEDICARE

## 2024-05-12 VITALS
OXYGEN SATURATION: 93 % | DIASTOLIC BLOOD PRESSURE: 58 MMHG | RESPIRATION RATE: 16 BRPM | BODY MASS INDEX: 30.68 KG/M2 | HEART RATE: 100 BPM | SYSTOLIC BLOOD PRESSURE: 108 MMHG | WEIGHT: 220 LBS | TEMPERATURE: 98.4 F

## 2024-05-12 VITALS
TEMPERATURE: 98.7 F | DIASTOLIC BLOOD PRESSURE: 64 MMHG | SYSTOLIC BLOOD PRESSURE: 90 MMHG | RESPIRATION RATE: 20 BRPM | HEART RATE: 84 BPM | OXYGEN SATURATION: 92 %

## 2024-05-12 DIAGNOSIS — R05.9 COUGH: ICD-10-CM

## 2024-05-12 DIAGNOSIS — I48.91 NEW ONSET ATRIAL FIBRILLATION (HCC): ICD-10-CM

## 2024-05-12 DIAGNOSIS — R53.1 WEAKNESS: Primary | ICD-10-CM

## 2024-05-12 DIAGNOSIS — J18.9 PNEUMONIA: Primary | ICD-10-CM

## 2024-05-12 LAB
ALBUMIN SERPL BCP-MCNC: 3.9 G/DL (ref 3.5–5)
ALP SERPL-CCNC: 38 U/L (ref 34–104)
ALT SERPL W P-5'-P-CCNC: 21 U/L (ref 7–52)
ANION GAP SERPL CALCULATED.3IONS-SCNC: 9 MMOL/L (ref 4–13)
AST SERPL W P-5'-P-CCNC: 21 U/L (ref 13–39)
BASOPHILS # BLD AUTO: 0.03 THOUSANDS/ÂΜL (ref 0–0.1)
BASOPHILS NFR BLD AUTO: 0 % (ref 0–1)
BILIRUB SERPL-MCNC: 1.25 MG/DL (ref 0.2–1)
BILIRUB UR QL STRIP: NEGATIVE
BUN SERPL-MCNC: 31 MG/DL (ref 5–25)
CALCIUM SERPL-MCNC: 9.7 MG/DL (ref 8.4–10.2)
CARDIAC TROPONIN I PNL SERPL HS: 6 NG/L
CHLORIDE SERPL-SCNC: 100 MMOL/L (ref 96–108)
CLARITY UR: CLEAR
CO2 SERPL-SCNC: 23 MMOL/L (ref 21–32)
COLOR UR: YELLOW
CREAT SERPL-MCNC: 1.67 MG/DL (ref 0.6–1.3)
EOSINOPHIL # BLD AUTO: 0.01 THOUSAND/ÂΜL (ref 0–0.61)
EOSINOPHIL NFR BLD AUTO: 0 % (ref 0–6)
ERYTHROCYTE [DISTWIDTH] IN BLOOD BY AUTOMATED COUNT: 13.1 % (ref 11.6–15.1)
FLUAV RNA RESP QL NAA+PROBE: NEGATIVE
FLUBV RNA RESP QL NAA+PROBE: NEGATIVE
GFR SERPL CREATININE-BSD FRML MDRD: 41 ML/MIN/1.73SQ M
GLUCOSE SERPL-MCNC: 123 MG/DL (ref 65–140)
GLUCOSE UR STRIP-MCNC: ABNORMAL MG/DL
HCT VFR BLD AUTO: 45.7 % (ref 36.5–49.3)
HGB BLD-MCNC: 14.7 G/DL (ref 12–17)
HGB UR QL STRIP.AUTO: NEGATIVE
IMM GRANULOCYTES # BLD AUTO: 0.16 THOUSAND/UL (ref 0–0.2)
IMM GRANULOCYTES NFR BLD AUTO: 1 % (ref 0–2)
KETONES UR STRIP-MCNC: NEGATIVE MG/DL
LEUKOCYTE ESTERASE UR QL STRIP: NEGATIVE
LYMPHOCYTES # BLD AUTO: 1.76 THOUSANDS/ÂΜL (ref 0.6–4.47)
LYMPHOCYTES NFR BLD AUTO: 14 % (ref 14–44)
MCH RBC QN AUTO: 28.8 PG (ref 26.8–34.3)
MCHC RBC AUTO-ENTMCNC: 32.2 G/DL (ref 31.4–37.4)
MCV RBC AUTO: 89 FL (ref 82–98)
MONOCYTES # BLD AUTO: 1.13 THOUSAND/ÂΜL (ref 0.17–1.22)
MONOCYTES NFR BLD AUTO: 9 % (ref 4–12)
NEUTROPHILS # BLD AUTO: 9.67 THOUSANDS/ÂΜL (ref 1.85–7.62)
NEUTS SEG NFR BLD AUTO: 76 % (ref 43–75)
NITRITE UR QL STRIP: NEGATIVE
NRBC BLD AUTO-RTO: 0 /100 WBCS
PH UR STRIP.AUTO: 5.5 [PH]
PLATELET # BLD AUTO: 240 THOUSANDS/UL (ref 149–390)
PMV BLD AUTO: 10.2 FL (ref 8.9–12.7)
POTASSIUM SERPL-SCNC: 4.2 MMOL/L (ref 3.5–5.3)
PROT SERPL-MCNC: 7.7 G/DL (ref 6.4–8.4)
PROT UR STRIP-MCNC: NEGATIVE MG/DL
RBC # BLD AUTO: 5.11 MILLION/UL (ref 3.88–5.62)
RSV RNA RESP QL NAA+PROBE: NEGATIVE
SARS-COV-2 RNA RESP QL NAA+PROBE: NEGATIVE
SODIUM SERPL-SCNC: 132 MMOL/L (ref 135–147)
SP GR UR STRIP.AUTO: 1.01 (ref 1–1.03)
UROBILINOGEN UR STRIP-ACNC: <2 MG/DL
WBC # BLD AUTO: 12.76 THOUSAND/UL (ref 4.31–10.16)

## 2024-05-12 PROCEDURE — 99285 EMERGENCY DEPT VISIT HI MDM: CPT

## 2024-05-12 PROCEDURE — 70450 CT HEAD/BRAIN W/O DYE: CPT

## 2024-05-12 PROCEDURE — 71045 X-RAY EXAM CHEST 1 VIEW: CPT

## 2024-05-12 PROCEDURE — 36415 COLL VENOUS BLD VENIPUNCTURE: CPT | Performed by: EMERGENCY MEDICINE

## 2024-05-12 PROCEDURE — 99285 EMERGENCY DEPT VISIT HI MDM: CPT | Performed by: EMERGENCY MEDICINE

## 2024-05-12 PROCEDURE — 99203 OFFICE O/P NEW LOW 30 MIN: CPT

## 2024-05-12 PROCEDURE — 0241U HB NFCT DS VIR RESP RNA 4 TRGT: CPT | Performed by: EMERGENCY MEDICINE

## 2024-05-12 PROCEDURE — 93005 ELECTROCARDIOGRAM TRACING: CPT

## 2024-05-12 PROCEDURE — 80053 COMPREHEN METABOLIC PANEL: CPT | Performed by: EMERGENCY MEDICINE

## 2024-05-12 PROCEDURE — 84484 ASSAY OF TROPONIN QUANT: CPT | Performed by: EMERGENCY MEDICINE

## 2024-05-12 PROCEDURE — G0463 HOSPITAL OUTPT CLINIC VISIT: HCPCS

## 2024-05-12 PROCEDURE — 85025 COMPLETE CBC W/AUTO DIFF WBC: CPT | Performed by: EMERGENCY MEDICINE

## 2024-05-12 RX ORDER — LISINOPRIL 5 MG/1
5 TABLET ORAL DAILY
Status: ON HOLD | COMMUNITY
Start: 2024-04-30

## 2024-05-12 RX ORDER — DOXYCYCLINE HYCLATE 100 MG/1
100 CAPSULE ORAL 2 TIMES DAILY
Qty: 10 CAPSULE | Refills: 0 | Status: SHIPPED | OUTPATIENT
Start: 2024-05-12 | End: 2024-05-17

## 2024-05-12 RX ORDER — METOPROLOL SUCCINATE 50 MG/1
50 TABLET, EXTENDED RELEASE ORAL DAILY
Status: ON HOLD | COMMUNITY
Start: 2024-02-20

## 2024-05-12 RX ORDER — FENOFIBRATE 160 MG/1
160 TABLET ORAL DAILY
Status: ON HOLD | COMMUNITY
Start: 2024-04-26

## 2024-05-12 NOTE — ED PROVIDER NOTES
History  Chief Complaint   Patient presents with    Weakness - Generalized     Pt to ED from home, states he has had a dry cough and went to urgent care today. Urgent care told him to come to ED, spouse mentioned that he seemed a little confused today. Urgent care also told him his BP was too low and needed to be evaluated.      Patient is a 68-year-old male who presents with dry cough x almost 1 week now. Patient reports that his wife and daughter both had colds last week, and then he started to have a dry cough. He has been a little more tired than usual the last few days. Wife reports that he had a few episodes in the last few days where he was transiently confused, like she asked him to throw away the recycling and he went to the trash, or she asked him to go to their master bedroom, but he went to a spare bedroom. States that he has cognitive decline post stroke, but this is a bit more than usual. They went to urgent care earlier today to get evaluated for the dry cough, and had a low blood pressure reading and thus was referred to the ER for evaluation.           Prior to Admission Medications   Prescriptions Last Dose Informant Patient Reported? Taking?   Cholecalciferol (VITAMIN D3) 1000 units CAPS  Spouse/Significant Other Yes No   Sig: i po daily   Cyanocobalamin (VITAMIN B-12 PO)  Spouse/Significant Other Yes No   Sig: Take 1,500 Units by mouth 3 (three) times a week    FLUoxetine (PROzac) 20 mg capsule  Spouse/Significant Other Yes No   Sig: TAKE 1 CAPSULE BY ORAL ROUTE EVERY DAY IN THE MORNING   Patient not taking: Reported on 11/18/2021   Jardiance 25 MG TABS   Yes No   Sig: Take 25 mg by mouth daily   LORazepam (ATIVAN) 0.5 mg tablet  Spouse/Significant Other Yes No   Sig: Take 0.5 mg by mouth if needed   aspirin 81 MG tablet  Spouse/Significant Other Yes No   Sig: Take 81 mg by mouth   atorvastatin (LIPITOR) 10 mg tablet  Spouse/Significant Other Yes No   Sig: Take 10 mg by mouth daily   cloNIDine  (CATAPRES) 0.1 mg tablet  Spouse/Significant Other Yes No   Sig: Take 0.1 mg by mouth 2 (two) times a day   Patient not taking: Reported on 11/18/2021    fenofibrate (TRICOR) 54 MG tablet  Spouse/Significant Other Yes No   Sig: Take 160 mg by mouth daily    fenofibrate 160 MG tablet   Yes No   Sig: Take 160 mg by mouth daily   glipiZIDE (GLUCOTROL XL) 10 mg 24 hr tablet  Spouse/Significant Other Yes No   Sig: Take 10 mg by mouth daily with breakfast 10 mg in am, 5mg in PM   levETIRAcetam (KEPPRA) 1000 MG tablet   No No   Sig: By mouth take half tablet in the morning and 1 full tablet in the evening daily   lisinopril (ZESTRIL) 20 mg tablet  Spouse/Significant Other Yes No   Sig: Take 10 mg by mouth daily   lisinopril (ZESTRIL) 5 mg tablet   Yes No   Sig: Take 5 mg by mouth daily   metFORMIN (GLUCOPHAGE) 500 mg tablet  Spouse/Significant Other Yes No   Sig: Take 500 mg by mouth 2 (two) times a day     Patient not taking: Reported on 11/18/2021    metoprolol succinate (TOPROL-XL) 25 mg 24 hr tablet   Yes No   Sig: Take 25 mg by mouth daily   metoprolol succinate (TOPROL-XL) 50 mg 24 hr tablet   Yes No   Sig: Take 50 mg by mouth daily   repaglinide (PRANDIN) 1 mg tablet  Spouse/Significant Other Yes No   Sig: TAKE 1 TABLET BY MOUTH 3 TIMES EVERY DAY 15 TO 30 MINUTES BEFORE MEALS   sildenafil (VIAGRA) 100 mg tablet  Spouse/Significant Other Yes No   Sig: As needed for ED.   Patient not taking: Reported on 11/18/2021   thiamine (VITAMIN B1) 100 mg tablet  Spouse/Significant Other Yes No   Sig: i po daily   warfarin (COUMADIN) 1 mg tablet  Spouse/Significant Other Yes No   Sig: take daily per warfarin protocol   Patient not taking: Reported on 11/18/2021   warfarin (COUMADIN) 4 mg tablet  Spouse/Significant Other Yes No   Sig: Take 4 mg by mouth 3.5   3 times a week, 4mg 4 x a week   warfarin (COUMADIN) 5 mg tablet  Spouse/Significant Other Yes No   Sig: Take 5 mg by mouth daily Everyday   Patient not taking: Reported on  2021       Facility-Administered Medications: None       Past Medical History:   Diagnosis Date    Diabetes (HCC)     Diabetes mellitus (HCC)     DVT (deep venous thrombosis) (HCC)     Dyslipidemia     Factor V deficiency (HCC)     Hypertension     Lobar cerebral hemorrhage (HCC)     Multiple pulmonary emboli (HCC)     Seizures (HCC) 2017    Stroke (HCC) 2016       Past Surgical History:   Procedure Laterality Date    IVC FILTER INSERTION      IVC umbrella    PROSTATE BIOPSY Bilateral 2018    Dr. Riley        Family History   Problem Relation Age of Onset    Kidney disease Father     Hypertension Father     Diabetes Father     Heart attack Father     Stroke Father     Hypertension Mother     Heart attack Mother     Hypertension Brother      I have reviewed and agree with the history as documented.    E-Cigarette/Vaping     E-Cigarette/Vaping Substances     Social History     Tobacco Use    Smoking status: Former     Current packs/day: 0.00     Types: Cigarettes     Quit date: 1989     Years since quittin.3    Smokeless tobacco: Never   Substance Use Topics    Alcohol use: No     Comment: 10 beer/week    Drug use: No       Review of Systems   Constitutional:  Positive for fatigue. Negative for chills and fever.   HENT:  Negative for congestion.    Respiratory:  Positive for cough. Negative for shortness of breath.    Cardiovascular:  Negative for chest pain and palpitations.   Gastrointestinal:  Negative for abdominal pain, diarrhea, nausea and vomiting.   Genitourinary:  Negative for dysuria, flank pain and hematuria.   Musculoskeletal:  Negative for back pain and neck pain.   Neurological:  Negative for dizziness, weakness, light-headedness, numbness and headaches.   Psychiatric/Behavioral:  Positive for confusion.        Physical Exam  Physical Exam  Vitals and nursing note reviewed.   Constitutional:       General: He is not in acute distress.     Appearance: Normal appearance.  He is not ill-appearing, toxic-appearing or diaphoretic.   HENT:      Head: Normocephalic and atraumatic.      Mouth/Throat:      Mouth: Mucous membranes are moist.   Eyes:      Conjunctiva/sclera: Conjunctivae normal.      Pupils: Pupils are equal, round, and reactive to light.   Cardiovascular:      Rate and Rhythm: Normal rate and regular rhythm.      Pulses: Normal pulses.      Heart sounds: Normal heart sounds. No murmur heard.  Pulmonary:      Effort: Pulmonary effort is normal. No respiratory distress.      Breath sounds: Normal breath sounds. No stridor. No wheezing, rhonchi or rales.   Chest:      Chest wall: No tenderness.   Abdominal:      General: Bowel sounds are normal. There is no distension.      Palpations: Abdomen is soft.      Tenderness: There is no abdominal tenderness. There is no guarding or rebound.   Musculoskeletal:      Right lower leg: No edema.      Left lower leg: No edema.   Skin:     General: Skin is warm and dry.   Neurological:      General: No focal deficit present.      Mental Status: He is alert and oriented to person, place, and time. Mental status is at baseline.         Vital Signs  ED Triage Vitals [05/12/24 0938]   Temperature Pulse Respirations Blood Pressure SpO2   98.4 °F (36.9 °C) 77 18 140/87 96 %      Temp src Heart Rate Source Patient Position - Orthostatic VS BP Location FiO2 (%)   -- Monitor Sitting Left arm --      Pain Score       No Pain           Vitals:    05/12/24 0938 05/12/24 1015 05/12/24 1226   BP: 140/87 108/69 108/58   Pulse: 77 71 100   Patient Position - Orthostatic VS: Sitting Sitting Sitting         Visual Acuity  Visual Acuity      Flowsheet Row Most Recent Value   L Pupil Size (mm) 3   R Pupil Size (mm) 3            ED Medications  Medications - No data to display    Diagnostic Studies  Results Reviewed       Procedure Component Value Units Date/Time    UA w Reflex to Microscopic w Reflex to Culture [[id number]]  (Abnormal) Collected: 05/12/24 9914     Lab Status: Final result Specimen: Urine, Clean Catch Updated: 05/12/24 1201     Color, UA Yellow     Clarity, UA Clear     Specific Gravity, UA 1.010     pH, UA 5.5     Leukocytes, UA Negative     Nitrite, UA Negative     Protein, UA Negative mg/dl      Glucose, UA 1000 (1%) mg/dl      Ketones, UA Negative mg/dl      Urobilinogen, UA <2.0 mg/dl      Bilirubin, UA Negative     Occult Blood, UA Negative    HS Troponin I 4hr [[id number]]     Lab Status: No result Specimen: Blood     FLU/RSV/COVID - if FLU/RSV clinically relevant [[id number]]  (Normal) Collected: 05/12/24 0956    Lab Status: Final result Specimen: Nares from Nose Updated: 05/12/24 1038     SARS-CoV-2 Negative     INFLUENZA A PCR Negative     INFLUENZA B PCR Negative     RSV PCR Negative    Narrative:      FOR PEDIATRIC PATIENTS - copy/paste COVID Guidelines URL to browser: [email or web address]    SARS-CoV-2 assay is a Nucleic Acid Amplification assay intended for the  qualitative detection of nucleic acid from SARS-CoV-2 in nasopharyngeal  swabs. Results are for the presumptive identification of SARS-CoV-2 RNA.    Positive results are indicative of infection with SARS-CoV-2, the virus  causing COVID-19, but do not rule out bacterial infection or co-infection  with other viruses. Laboratories within the [place] and its  territories are required to report all positive results to the appropriate  public health authorities. Negative results do not preclude SARS-CoV-2  infection and should not be used as the sole basis for treatment or other  patient management decisions. Negative results must be combined with  clinical observations, patient history, and epidemiological information.  This test has not been FDA cleared or approved.    This test has been authorized by FDA under an Emergency Use Authorization  (EUA). This test is only authorized for the duration of time the  declaration that  circumstances exist justifying the authorization of the  emergency use of an in vitro diagnostic tests for detection of SARS-CoV-2  virus and/or diagnosis of COVID-19 infection under section 564(b)(1) of  the Act, 21 U.S.C. 360bbb-3(b)(1), unless the authorization is terminated  or revoked sooner. The test has been validated but independent review by FDA  and CLIA is pending.    Test performed using [employer] GeneXpert: This RT-PCR assay targets N2,  a region unique to SARS-CoV-2. A conserved region in the E-gene was chosen  for pan-Sarbecovirus detection which includes SARS-CoV-2.    According to CMS-2020-01-R, this platform meets the definition of high-throughput technology.    HS Troponin I 2hr [[id number]]     Lab Status: No result Specimen: Blood     HS Troponin 0hr (reflex protocol) [[id number]]  (Normal) Collected: 05/12/24 0956    Lab Status: Final result Specimen: Blood from Arm, Left Updated: 05/12/24 1024     hs TnI 0hr 6 ng/L     Comprehensive metabolic panel [[id number]]  (Abnormal) Collected: 05/12/24 0956    Lab Status: Final result Specimen: Blood from Arm, Left Updated: 05/12/24 1017     Sodium 132 mmol/L      Potassium 4.2 mmol/L      Chloride 100 mmol/L      CO2 23 mmol/L      ANION GAP 9 mmol/L      BUN 31 mg/dL      Creatinine 1.67 mg/dL      Glucose 123 mg/dL      Calcium 9.7 mg/dL      AST 21 U/L      ALT 21 U/L      Alkaline Phosphatase 38 U/L      Total Protein 7.7 g/dL      Albumin 3.9 g/dL      Total Bilirubin 1.25 mg/dL      eGFR 41 ml/min/1.73sq m     Narrative:      National Kidney Disease Foundation guidelines for Chronic Kidney Disease (CKD):     Stage 1 with normal or high GFR (GFR > 90 mL/min/1.73 square meters)    Stage 2 Mild CKD (GFR = 60-89 mL/min/1.73 square meters)    Stage 3A Moderate CKD (GFR = 45-59 mL/min/1.73 square meters)    Stage 3B Moderate CKD (GFR = 30-44 mL/min/1.73 square meters)    Stage 4 Severe CKD (GFR = 15-29 mL/min/1.73 square meters)    Stage 5 End Stage CKD  (GFR <15 mL/min/1.73 square meters)  Note: GFR calculation is accurate only with a steady state creatinine    CBC and differential [[id number]]  (Abnormal) Collected: 05/12/24 0956    Lab Status: Final result Specimen: Blood from Arm, Left Updated: 05/12/24 1002     WBC 12.76 Thousand/uL      RBC 5.11 Million/uL      Hemoglobin 14.7 g/dL      Hematocrit 45.7 %      MCV 89 fL      MCH 28.8 pg      MCHC 32.2 g/dL      RDW 13.1 %      MPV 10.2 fL      Platelets 240 Thousands/uL      nRBC 0 /100 WBCs      Segmented % 76 %      Immature Grans % 1 %      Lymphocytes % 14 %      Monocytes % 9 %      Eosinophils Relative 0 %      Basophils Relative 0 %      Absolute Neutrophils 9.67 Thousands/µL      Absolute Immature Grans 0.16 Thousand/uL      Absolute Lymphocytes 1.76 Thousands/µL      Absolute Monocytes 1.13 Thousand/µL      Eosinophils Absolute 0.01 Thousand/µL      Basophils Absolute 0.03 Thousands/µL                    CT head without contrast   Final Result by Freddie Peralta MD (05/12 1216)   Sequela of old infarct in the right posterior parietal-occipital lobe.   No acute intracranial abnormality.                  Workstation performed: [id number]         XR chest 1 view portable   ED Interpretation by Nancy Altman DO (05/12 1015)   Abnormal   Small area of consolidation starting in the RLL concerning for PNA as interpreted by me independently       Final Result by Danna Son MD (05/12 1155)      No acute cardiopulmonary disease.            Workstation performed: [id number]                    Procedures  Procedures         ED Course  ED Course as of 05/12/24 1251   Sun May 12, 2024   1020 Creatinine(!): 1.67  Within baseline range     1021 Sodium(!): 132  Mild hyponatremia   1214 Patient noted to be in new onset atrial fibrillation.  Review of medical records from the [facility] from 4/30/2024 shows that the patient has a past medical history significant for coronary artery disease, premature  beats, hypertension, type 2 diabetes, hypercoagulable state, history of prior DVT, CVA with left-sided hemiparesis, chronic kidney disease stage III.  Patient takes Coumadin and is already on 50 mg of metoprolol.  He had a MI IBI that showed possible inferior wall scar but no ischemia in April 2024 as well as an echocardiogram that was grossly normal with normal wall motion.  As patient is already on the treatment that I would place the patient in new onset atrial fibrillation, I had a lengthy discussion with the patient and his wife at bedside that he needs to follow-up with cardiologist in the setting of this new onset atrial fibrillation.   1245 I also had a lengthy discussion with the patient and his wife about doxycycline for the pneumonia and how doxycycline can affect INR levels.  Patient's INR is fairly stable per patient's wife and she has a prescription to get it checked as often as she needs.  I recommended rechecking 1 week from now.  Reviewed strict return precautions which patient and wife verbalized understanding of.       [id number] SCORE      Flowsheet Row Most Recent Value   [id number]    Age 1 Filed at: 05/12/2024 1250   Sex 0 Filed at: 05/12/2024 1250   CHF History 0 Filed at: 05/12/2024 1250   HTN History 1 Filed at: 05/12/2024 1250   Stroke or TIA Symptoms Previously 2 Filed at: 05/12/2024 1250   Vascular Disease History 1 Filed at: 05/12/2024 1250   Diabetes History 1 Filed at: 05/12/2024 1250   [id number] Score 6 Filed at: 05/12/2024 1250              HEART Risk Score      Flowsheet Row Most Recent Value   Heart Score Risk Calculator    History 0 Filed at: 05/12/2024 1250   ECG 1 Filed at: 05/12/2024 1250   Age 2 Filed at: 05/12/2024 1250   Risk Factors 2 Filed at: 05/12/2024 1250   Troponin 0 Filed at: 05/12/2024 1250   HEART Score 5 Filed at: 05/12/2024 1250                          SBIRT 22yo+      Flowsheet Row Most Recent Value   Initial Alcohol Screen: US AUDIT-C     1. How often  do you have a drink containing alcohol? 0 Filed at: 05/12/2024 0938   2. How many drinks containing alcohol do you have on a typical day you are drinking?  0 Filed at: 05/12/2024 0938   3a. Male UNDER 65: How often do you have five or more drinks on one occasion? 0 Filed at: 05/12/2024 0938   3b. FEMALE Any Age, or MALE 65+: How often do you have 4 or more drinks on one occassion? 0 Filed at: 05/12/2024 0938   Audit-C Score 0 Filed at: 05/12/2024 0938   SULEMAN: How many times in the past year have you...    Used an illegal drug or used a prescription medication for non-medical reasons? Never Filed at: 05/12/2024 0938                      Medical Decision Making    Assessment and plan:  Differential includes URI v. PNA.  Check labs to evaluate for leukocytosis, anemia, electrolyte imbalance, kidney and liver function; urinalysis to rule out urinary tract infection; chest x-ray to evaluate for pneumonia; EKG/troponin to evaluate for arrhythmia/ischemia; CT head to rule out ICH.    Review of medical records, specifically from neurology note from 11/20/2023 shows that the patient has a past medical history significant for stroke, cerebral hemorrhage, focal idiopathic epilepsy.  Review of outside hospital medical records from 2/13/2024 shows that the patient additionally has a past medical history significant for hypertension, diabetes, hyperlipidemia, CKD stage III.  Review of outside hospital radiation oncology notes from 4/4/2024 shows that the patient also has a history of prostate cancer status post radiation.    Amount and/or Complexity of Data Reviewed  Labs: ordered. Decision-making details documented in ED Course.  Radiology: ordered and independent interpretation performed.    Risk  Prescription drug management.             Disposition  Final diagnoses:   Pneumonia   Cough   New onset atrial fibrillation (HCC)     Time reflects when diagnosis was documented in both MDM as applicable and the Disposition within  this note       Time User Action Codes Description Comment    5/12/2024 12:27 PM Nancy Altman [J18.9] Pneumonia     5/12/2024 12:27 PM Nancy Altman Add [R05.9] Cough     5/12/2024 12:27 PM Nancy Altman [I48.91] New onset atrial fibrillation (HCC)           ED Disposition       ED Disposition   Discharge    Condition   Stable    Date/Time   Sun May 12, 2024 1227    Comment   Brian Lal discharge to home/self care.                   Follow-up Information       Follow up With Specialties Details Why Contact Info Additional Information     [facility] Emergency Medicine Go to  As needed, If symptoms worsen, for re-evaluation [address] [place] [id number] [phone] [facility] Emergency Department, [address] [place] [id number]    Douglas Charles Shoenberger, MD Family Medicine Schedule an appointment as soon as possible for a visit in 3 days for re-evaluation [address] [place] [id number]  [phone]       [facility]  Call in 1 day for re-evaluation              Patient's Medications   Discharge Prescriptions    DOXYCYCLINE HYCLATE (VIBRAMYCIN) 100 MG CAPSULE    Take 1 capsule (100 mg total) by mouth 2 (two) times a day for 5 days       Start Date: 5/12/2024 End Date: 5/17/2024       Order Dose: 100 mg       Quantity: 10 capsule    Refills: 0       No discharge procedures on file.    PDMP Review       None            ED Provider  Electronically Signed by             Nancy Altman DO  05/12/24 6682

## 2024-05-12 NOTE — PROGRESS NOTES
[facility]        NAME: Brian Lal is a 68 y.o. male  : 1955    MRN: [id number]  DATE: May 12, 2024  TIME: 9:19 AM    Assessment and Plan   Weakness [R53.1]  1. Weakness  Transfer to other facility            Patient Instructions       Follow up with PCP in 3-5 days.  Proceed to  ER if symptoms worsen.    If tests have been performed at [facility], our office will contact you with results if changes need to be made to the care plan discussed with you at the visit.  [facility]    Chief Complaint     Chief Complaint   Patient presents with    Cough     Patient has had a dry cough for the past 2 weeks. Also reports generalized weakness and nasal congestion. Wife states he seems a little more confused than usual          History of Present Illness       Patient presents with chest congestion, dry cough, feeling weak, unsteady, and more confused than usual for the past 2 weeks.   Denies SOB, dyspnea, chest pains, fevers.     Cough  Pertinent negatives include no chest pain, chills, ear pain, fever, myalgias, postnasal drip, rhinorrhea, sore throat, shortness of breath or wheezing.       Review of Systems   Review of Systems   Constitutional:  Positive for fatigue. Negative for chills and fever.   HENT:  Positive for congestion. Negative for ear discharge, ear pain, postnasal drip, rhinorrhea, sinus pressure, sinus pain and sore throat.    Respiratory:  Positive for cough. Negative for chest tightness, shortness of breath and wheezing.    Cardiovascular:  Negative for chest pain and palpitations.   Musculoskeletal:  Negative for arthralgias and myalgias.   Neurological:  Positive for weakness.   Psychiatric/Behavioral:  Positive for confusion.          Current Medications       Current Outpatient Medications:     aspirin 81 MG tablet, Take 81 mg by mouth, Disp: , Rfl:     atorvastatin (LIPITOR) 10 mg tablet, Take 10 mg by mouth daily, Disp: , Rfl: 3     Cholecalciferol (VITAMIN D3) 1000 units CAPS, i po daily, Disp: , Rfl:     Cyanocobalamin (VITAMIN B-12 PO), Take 1,500 Units by mouth 3 (three) times a week , Disp: , Rfl:     fenofibrate (TRICOR) 54 MG tablet, Take 160 mg by mouth daily , Disp: , Rfl: 1    fenofibrate 160 MG tablet, Take 160 mg by mouth daily, Disp: , Rfl:     glipiZIDE (GLUCOTROL XL) 10 mg 24 hr tablet, Take 10 mg by mouth daily with breakfast 10 mg in am, 5mg in PM, Disp: , Rfl:     Jardiance 25 MG TABS, Take 25 mg by mouth daily, Disp: , Rfl:     levETIRAcetam (KEPPRA) 1000 MG tablet, By mouth take half tablet in the morning and 1 full tablet in the evening daily, Disp: 135 tablet, Rfl: 3    lisinopril (ZESTRIL) 20 mg tablet, Take 10 mg by mouth daily, Disp: , Rfl: 1    lisinopril (ZESTRIL) 5 mg tablet, Take 5 mg by mouth daily, Disp: , Rfl:     LORazepam (ATIVAN) 0.5 mg tablet, Take 0.5 mg by mouth if needed, Disp: , Rfl:     metoprolol succinate (TOPROL-XL) 25 mg 24 hr tablet, Take 25 mg by mouth daily, Disp: , Rfl:     metoprolol succinate (TOPROL-XL) 50 mg 24 hr tablet, Take 50 mg by mouth daily, Disp: , Rfl:     thiamine (VITAMIN B1) 100 mg tablet, i po daily, Disp: , Rfl:     warfarin (COUMADIN) 4 mg tablet, Take 4 mg by mouth 3.5   3 times a week, 4mg 4 x a week, Disp: , Rfl:     cloNIDine (CATAPRES) 0.1 mg tablet, Take 0.1 mg by mouth 2 (two) times a day (Patient not taking: Reported on 11/18/2021 ), Disp: , Rfl: 2    FLUoxetine (PROzac) 20 mg capsule, TAKE 1 CAPSULE BY ORAL ROUTE EVERY DAY IN THE MORNING (Patient not taking: Reported on 11/18/2021), Disp: , Rfl: 2    metFORMIN (GLUCOPHAGE) 500 mg tablet, Take 500 mg by mouth 2 (two) times a day   (Patient not taking: Reported on 11/18/2021 ), Disp: , Rfl:     repaglinide (PRANDIN) 1 mg tablet, TAKE 1 TABLET BY MOUTH 3 TIMES EVERY DAY 15 TO 30 MINUTES BEFORE MEALS, Disp: , Rfl:     sildenafil (VIAGRA) 100 mg tablet, As needed for ED. (Patient not taking: Reported on 11/18/2021), Disp: ,  Rfl:     warfarin (COUMADIN) 1 mg tablet, take daily per warfarin protocol (Patient not taking: Reported on 11/18/2021), Disp: , Rfl:     warfarin (COUMADIN) 5 mg tablet, Take 5 mg by mouth daily Everyday (Patient not taking: Reported on 11/18/2021 ), Disp: , Rfl: 1    Current Allergies     Allergies as of 05/12/2024 - Reviewed 05/12/2024   Allergen Reaction Noted    Penicillins Hives 09/14/2012            The following portions of the patient's history were reviewed and updated as appropriate: allergies, current medications, past family history, past medical history, past social history, past surgical history and problem list.     Past Medical History:   Diagnosis Date    Diabetes (HCC)     Diabetes mellitus (HCC)     DVT (deep venous thrombosis) (HCC) 2016    Dyslipidemia     Factor V deficiency (HCC)     Hypertension     Lobar cerebral hemorrhage (HCC)     Multiple pulmonary emboli (HCC)     Seizures (HCC) 07/2017    Stroke (HCC) 12/14/2016       Past Surgical History:   Procedure Laterality Date    IVC FILTER INSERTION      IVC umbrella    PROSTATE BIOPSY Bilateral 09/06/2018    Dr. Riley        Family History   Problem Relation Age of Onset    Kidney disease Father     Hypertension Father     Diabetes Father     Heart attack Father     Stroke Father     Hypertension Mother     Heart attack Mother     Hypertension Brother          Medications have been verified.        Objective   BP 90/64   Pulse 84   Temp 98.7 °F (37.1 °C)   Resp 20   SpO2 92%   No LMP for male patient.       Physical Exam     Physical Exam  Constitutional:       General: He is not in acute distress.     Appearance: Normal appearance. He is not ill-appearing or diaphoretic.   HENT:      Right Ear: Tympanic membrane, ear canal and external ear normal.      Left Ear: Tympanic membrane, ear canal and external ear normal.      Nose: Nose normal.      Mouth/Throat:      Mouth: Mucous membranes are moist.      Pharynx: Oropharynx is clear.    Eyes:      Conjunctiva/sclera: Conjunctivae normal.   Cardiovascular:      Rate and Rhythm: Normal rate and regular rhythm.      Heart sounds: Normal heart sounds.   Pulmonary:      Effort: Pulmonary effort is normal.      Breath sounds: Rhonchi present.   Skin:     General: Skin is warm and dry.   Neurological:      Mental Status: He is alert.   Psychiatric:         Mood and Affect: Mood normal.         Behavior: Behavior normal.

## 2024-05-12 NOTE — DISCHARGE INSTRUCTIONS
Please get your INR rechecked in 5-7 days as the doxycycline can effect the INR.   Follow-up with your cardiologist tomorrow in the setting of new onset atrial fibrillation.  Return to the emergency department for the following, but not limited to fevers, shortness of breath, chest pain, lightheadedness, dizziness.

## 2024-05-13 LAB
ATRIAL RATE: 187 BPM
ATRIAL RATE: 81 BPM
P AXIS: 50 DEGREES
PR INTERVAL: 166 MS
QRS AXIS: -41 DEGREES
QRS AXIS: -55 DEGREES
QRSD INTERVAL: 100 MS
QRSD INTERVAL: 104 MS
QT INTERVAL: 372 MS
QT INTERVAL: 406 MS
QTC INTERVAL: 471 MS
QTC INTERVAL: 498 MS
T WAVE AXIS: 48 DEGREES
T WAVE AXIS: 55 DEGREES
VENTRICULAR RATE: 108 BPM
VENTRICULAR RATE: 81 BPM

## 2024-05-13 PROCEDURE — 93010 ELECTROCARDIOGRAM REPORT: CPT | Performed by: INTERNAL MEDICINE

## 2024-05-22 ENCOUNTER — ANESTHESIA EVENT (INPATIENT)
Dept: RADIOLOGY | Facility: HOSPITAL | Age: 69
DRG: 981 | End: 2024-05-22
Payer: MEDICARE

## 2024-05-22 ENCOUNTER — APPOINTMENT (INPATIENT)
Dept: RADIOLOGY | Facility: HOSPITAL | Age: 69
DRG: 981 | End: 2024-05-22
Payer: MEDICARE

## 2024-05-22 ENCOUNTER — ANESTHESIA (INPATIENT)
Dept: RADIOLOGY | Facility: HOSPITAL | Age: 69
DRG: 981 | End: 2024-05-22
Payer: MEDICARE

## 2024-05-22 ENCOUNTER — APPOINTMENT (EMERGENCY)
Dept: RADIOLOGY | Facility: HOSPITAL | Age: 69
DRG: 981 | End: 2024-05-22
Payer: MEDICARE

## 2024-05-22 ENCOUNTER — HOSPITAL ENCOUNTER (INPATIENT)
Facility: HOSPITAL | Age: 69
LOS: 6 days | Discharge: HOME HEALTH | DRG: 981 | End: 2024-05-28
Attending: EMERGENCY MEDICINE | Admitting: SURGERY
Payer: MEDICARE

## 2024-05-22 DIAGNOSIS — F10.10 ALCOHOL ABUSE: ICD-10-CM

## 2024-05-22 DIAGNOSIS — T14.8XXA SUBCUTANEOUS HEMATOMA: Primary | ICD-10-CM

## 2024-05-22 DIAGNOSIS — Z86.73 HISTORY OF STROKE: ICD-10-CM

## 2024-05-22 DIAGNOSIS — Z86.718 HISTORY OF DVT (DEEP VEIN THROMBOSIS): ICD-10-CM

## 2024-05-22 DIAGNOSIS — E11.9 DM (DIABETES MELLITUS) (HCC): ICD-10-CM

## 2024-05-22 DIAGNOSIS — I10 HTN (HYPERTENSION): ICD-10-CM

## 2024-05-22 DIAGNOSIS — E78.5 HLD (HYPERLIPIDEMIA): ICD-10-CM

## 2024-05-22 LAB
2HR DELTA HS TROPONIN: 1 NG/L
4HR DELTA HS TROPONIN: 1 NG/L
ABO GROUP BLD: NORMAL
ABO GROUP BLD: NORMAL
ALBUMIN SERPL BCP-MCNC: 3.2 G/DL (ref 3.5–5)
ALBUMIN SERPL BCP-MCNC: 3.4 G/DL (ref 3.5–5)
ALP SERPL-CCNC: 24 U/L (ref 34–104)
ALP SERPL-CCNC: 28 U/L (ref 34–104)
ALT SERPL W P-5'-P-CCNC: 13 U/L (ref 7–52)
ALT SERPL W P-5'-P-CCNC: 14 U/L (ref 7–52)
AMMONIA PLAS-SCNC: 24 UMOL/L (ref 18–72)
ANION GAP SERPL CALCULATED.3IONS-SCNC: 11 MMOL/L (ref 4–13)
ANION GAP SERPL CALCULATED.3IONS-SCNC: 7 MMOL/L (ref 4–13)
APAP SERPL-MCNC: <2 UG/ML (ref 10–20)
AST SERPL W P-5'-P-CCNC: 14 U/L (ref 13–39)
AST SERPL W P-5'-P-CCNC: 17 U/L (ref 13–39)
BACTERIA UR QL AUTO: NORMAL /HPF
BASE EXCESS BLDA CALC-SCNC: -3.5 MMOL/L
BASE EXCESS BLDA CALC-SCNC: -8 MMOL/L (ref -2–3)
BASOPHILS # BLD AUTO: 0.01 THOUSANDS/ÂΜL (ref 0–0.1)
BASOPHILS # BLD AUTO: 0.02 THOUSANDS/ÂΜL (ref 0–0.1)
BASOPHILS NFR BLD AUTO: 0 % (ref 0–1)
BASOPHILS NFR BLD AUTO: 0 % (ref 0–1)
BILIRUB SERPL-MCNC: 0.71 MG/DL (ref 0.2–1)
BILIRUB SERPL-MCNC: 1.53 MG/DL (ref 0.2–1)
BILIRUB UR QL STRIP: NEGATIVE
BLD GP AB SCN SERPL QL: NEGATIVE
BUN SERPL-MCNC: 18 MG/DL (ref 5–25)
BUN SERPL-MCNC: 23 MG/DL (ref 5–25)
CA-I BLD-SCNC: 1.23 MMOL/L (ref 1.12–1.32)
CALCIUM ALBUM COR SERPL-MCNC: 8.7 MG/DL (ref 8.3–10.1)
CALCIUM ALBUM COR SERPL-MCNC: 8.8 MG/DL (ref 8.3–10.1)
CALCIUM SERPL-MCNC: 8.1 MG/DL (ref 8.4–10.2)
CALCIUM SERPL-MCNC: 8.3 MG/DL (ref 8.4–10.2)
CARDIAC TROPONIN I PNL SERPL HS: 3 NG/L
CARDIAC TROPONIN I PNL SERPL HS: 4 NG/L
CARDIAC TROPONIN I PNL SERPL HS: 4 NG/L
CFFMA (FUNCTIONAL FIBRINOGEN MAX AMPLITUDE): 26 MM (ref 15–32)
CHLORIDE SERPL-SCNC: 107 MMOL/L (ref 96–108)
CHLORIDE SERPL-SCNC: 99 MMOL/L (ref 96–108)
CKLY30: 0.1 % (ref 0–2.6)
CKR(REACTION TIME): 7 MIN (ref 4.6–9.1)
CLARITY UR: CLEAR
CO2 SERPL-SCNC: 17 MMOL/L (ref 21–32)
CO2 SERPL-SCNC: 21 MMOL/L (ref 21–32)
COLOR UR: COLORLESS
CREAT SERPL-MCNC: 1.19 MG/DL (ref 0.6–1.3)
CREAT SERPL-MCNC: 1.5 MG/DL (ref 0.6–1.3)
CRTMA(RAPIDTEG MAX AMPLITUDE): 66.3 MM (ref 52–70)
EOSINOPHIL # BLD AUTO: 0 THOUSAND/ÂΜL (ref 0–0.61)
EOSINOPHIL # BLD AUTO: 0 THOUSAND/ÂΜL (ref 0–0.61)
EOSINOPHIL NFR BLD AUTO: 0 % (ref 0–6)
EOSINOPHIL NFR BLD AUTO: 0 % (ref 0–6)
ERYTHROCYTE [DISTWIDTH] IN BLOOD BY AUTOMATED COUNT: 13.2 % (ref 11.6–15.1)
ERYTHROCYTE [DISTWIDTH] IN BLOOD BY AUTOMATED COUNT: 13.2 % (ref 11.6–15.1)
ETHANOL SERPL-MCNC: 30 MG/DL
GFR SERPL CREATININE-BSD FRML MDRD: 46 ML/MIN/1.73SQ M
GFR SERPL CREATININE-BSD FRML MDRD: 61 ML/MIN/1.73SQ M
GLUCOSE SERPL-MCNC: 104 MG/DL (ref 65–140)
GLUCOSE SERPL-MCNC: 138 MG/DL (ref 65–140)
GLUCOSE SERPL-MCNC: 55 MG/DL (ref 65–140)
GLUCOSE SERPL-MCNC: 73 MG/DL (ref 65–140)
GLUCOSE SERPL-MCNC: 73 MG/DL (ref 65–140)
GLUCOSE SERPL-MCNC: 77 MG/DL (ref 65–140)
GLUCOSE SERPL-MCNC: 92 MG/DL (ref 65–140)
GLUCOSE SERPL-MCNC: 98 MG/DL (ref 65–140)
GLUCOSE UR STRIP-MCNC: ABNORMAL MG/DL
HCO3 BLDA-SCNC: 17.7 MMOL/L (ref 22–28)
HCO3 BLDA-SCNC: 20.5 MMOL/L (ref 22–28)
HCT VFR BLD AUTO: 29.8 % (ref 36.5–49.3)
HCT VFR BLD AUTO: 31.4 % (ref 36.5–49.3)
HCT VFR BLD AUTO: 31.7 % (ref 36.5–49.3)
HCT VFR BLD CALC: 27 % (ref 36.5–49.3)
HGB BLD-MCNC: 10 G/DL (ref 12–17)
HGB BLD-MCNC: 10.6 G/DL (ref 12–17)
HGB BLD-MCNC: 10.6 G/DL (ref 12–17)
HGB BLDA-MCNC: 9.2 G/DL (ref 12–17)
HGB UR QL STRIP.AUTO: NEGATIVE
IMM GRANULOCYTES # BLD AUTO: 0.06 THOUSAND/UL (ref 0–0.2)
IMM GRANULOCYTES # BLD AUTO: 0.1 THOUSAND/UL (ref 0–0.2)
IMM GRANULOCYTES NFR BLD AUTO: 1 % (ref 0–2)
IMM GRANULOCYTES NFR BLD AUTO: 1 % (ref 0–2)
INR PPP: 2.15 (ref 0.84–1.19)
INR PPP: 4.57 (ref 0.84–1.19)
KETONES UR STRIP-MCNC: NEGATIVE MG/DL
LEUKOCYTE ESTERASE UR QL STRIP: ABNORMAL
LYMPHOCYTES # BLD AUTO: 1.38 THOUSANDS/ÂΜL (ref 0.6–4.47)
LYMPHOCYTES # BLD AUTO: 1.46 THOUSANDS/ÂΜL (ref 0.6–4.47)
LYMPHOCYTES NFR BLD AUTO: 15 % (ref 14–44)
LYMPHOCYTES NFR BLD AUTO: 21 % (ref 14–44)
MAGNESIUM SERPL-MCNC: 1.8 MG/DL (ref 1.9–2.7)
MCH RBC QN AUTO: 29.4 PG (ref 26.8–34.3)
MCH RBC QN AUTO: 29.9 PG (ref 26.8–34.3)
MCHC RBC AUTO-ENTMCNC: 33.4 G/DL (ref 31.4–37.4)
MCHC RBC AUTO-ENTMCNC: 33.8 G/DL (ref 31.4–37.4)
MCV RBC AUTO: 88 FL (ref 82–98)
MCV RBC AUTO: 89 FL (ref 82–98)
MONOCYTES # BLD AUTO: 0.61 THOUSAND/ÂΜL (ref 0.17–1.22)
MONOCYTES # BLD AUTO: 0.82 THOUSAND/ÂΜL (ref 0.17–1.22)
MONOCYTES NFR BLD AUTO: 8 % (ref 4–12)
MONOCYTES NFR BLD AUTO: 9 % (ref 4–12)
NEUTROPHILS # BLD AUTO: 4.66 THOUSANDS/ÂΜL (ref 1.85–7.62)
NEUTROPHILS # BLD AUTO: 7.35 THOUSANDS/ÂΜL (ref 1.85–7.62)
NEUTS SEG NFR BLD AUTO: 69 % (ref 43–75)
NEUTS SEG NFR BLD AUTO: 76 % (ref 43–75)
NITRITE UR QL STRIP: NEGATIVE
NON-SQ EPI CELLS URNS QL MICRO: NORMAL /HPF
NRBC BLD AUTO-RTO: 0 /100 WBCS
NRBC BLD AUTO-RTO: 0 /100 WBCS
O2 CT BLDA-SCNC: 15.4 ML/DL (ref 16–23)
OXYHGB MFR BLDA: 96.5 % (ref 94–97)
PCO2 BLD: 19 MMOL/L (ref 21–32)
PCO2 BLD: 36.3 MM HG (ref 36–44)
PCO2 BLDA: 33.3 MM HG (ref 36–44)
PH BLD: 7.29 [PH] (ref 7.35–7.45)
PH BLDA: 7.41 [PH] (ref 7.35–7.45)
PH UR STRIP.AUTO: 5 [PH]
PHOSPHATE SERPL-MCNC: 3.5 MG/DL (ref 2.3–4.1)
PLATELET # BLD AUTO: 199 THOUSANDS/UL (ref 149–390)
PLATELET # BLD AUTO: 237 THOUSANDS/UL (ref 149–390)
PMV BLD AUTO: 9.3 FL (ref 8.9–12.7)
PMV BLD AUTO: 9.5 FL (ref 8.9–12.7)
PO2 BLD: 165 MM HG (ref 75–129)
PO2 BLDA: 108.6 MM HG (ref 75–129)
POTASSIUM BLD-SCNC: 4.4 MMOL/L (ref 3.5–5.3)
POTASSIUM SERPL-SCNC: 4.2 MMOL/L (ref 3.5–5.3)
POTASSIUM SERPL-SCNC: 4.3 MMOL/L (ref 3.5–5.3)
PROT SERPL-MCNC: 5.3 G/DL (ref 6.4–8.4)
PROT SERPL-MCNC: 5.4 G/DL (ref 6.4–8.4)
PROT UR STRIP-MCNC: NEGATIVE MG/DL
PROTHROMBIN TIME: 23.6 SECONDS (ref 11.6–14.5)
PROTHROMBIN TIME: 42.2 SECONDS (ref 11.6–14.5)
RBC # BLD AUTO: 3.54 MILLION/UL (ref 3.88–5.62)
RBC # BLD AUTO: 3.6 MILLION/UL (ref 3.88–5.62)
RBC #/AREA URNS AUTO: NORMAL /HPF
RH BLD: POSITIVE
RH BLD: POSITIVE
SALICYLATES SERPL-MCNC: <5 MG/DL (ref 3–20)
SAO2 % BLD FROM PO2: 99 % (ref 60–85)
SODIUM BLD-SCNC: 130 MMOL/L (ref 136–145)
SODIUM SERPL-SCNC: 127 MMOL/L (ref 135–147)
SODIUM SERPL-SCNC: 135 MMOL/L (ref 135–147)
SP GR UR STRIP.AUTO: 1.01 (ref 1–1.03)
SPECIMEN EXPIRATION DATE: NORMAL
SPECIMEN SOURCE: ABNORMAL
SPECIMEN SOURCE: ABNORMAL
TSH SERPL DL<=0.05 MIU/L-ACNC: 0.96 UIU/ML (ref 0.45–4.5)
UROBILINOGEN UR STRIP-ACNC: <2 MG/DL
VIT B12 SERPL-MCNC: 543 PG/ML (ref 180–914)
WBC # BLD AUTO: 6.72 THOUSAND/UL (ref 4.31–10.16)
WBC # BLD AUTO: 9.75 THOUSAND/UL (ref 4.31–10.16)
WBC #/AREA URNS AUTO: NORMAL /HPF

## 2024-05-22 PROCEDURE — 84484 ASSAY OF TROPONIN QUANT: CPT

## 2024-05-22 PROCEDURE — 73706 CT ANGIO LWR EXTR W/O&W/DYE: CPT

## 2024-05-22 PROCEDURE — C1760 CLOSURE DEV, VASC: HCPCS

## 2024-05-22 PROCEDURE — 85014 HEMATOCRIT: CPT

## 2024-05-22 PROCEDURE — 36247 INS CATH ABD/L-EXT ART 3RD: CPT | Performed by: RADIOLOGY

## 2024-05-22 PROCEDURE — 83735 ASSAY OF MAGNESIUM: CPT

## 2024-05-22 PROCEDURE — 82805 BLOOD GASES W/O2 SATURATION: CPT

## 2024-05-22 PROCEDURE — 86900 BLOOD TYPING SEROLOGIC ABO: CPT

## 2024-05-22 PROCEDURE — 30233N1 TRANSFUSION OF NONAUTOLOGOUS RED BLOOD CELLS INTO PERIPHERAL VEIN, PERCUTANEOUS APPROACH: ICD-10-PCS | Performed by: SURGERY

## 2024-05-22 PROCEDURE — 37244 VASC EMBOLIZE/OCCLUDE BLEED: CPT | Performed by: RADIOLOGY

## 2024-05-22 PROCEDURE — 99223 1ST HOSP IP/OBS HIGH 75: CPT | Performed by: SURGERY

## 2024-05-22 PROCEDURE — 85610 PROTHROMBIN TIME: CPT

## 2024-05-22 PROCEDURE — P9016 RBC LEUKOCYTES REDUCED: HCPCS

## 2024-05-22 PROCEDURE — 37241 VASC EMBOLIZE/OCCLUDE VENOUS: CPT

## 2024-05-22 PROCEDURE — 82330 ASSAY OF CALCIUM: CPT

## 2024-05-22 PROCEDURE — 85397 CLOTTING FUNCT ACTIVITY: CPT | Performed by: PHYSICIAN ASSISTANT

## 2024-05-22 PROCEDURE — 81001 URINALYSIS AUTO W/SCOPE: CPT

## 2024-05-22 PROCEDURE — 80053 COMPREHEN METABOLIC PANEL: CPT

## 2024-05-22 PROCEDURE — 96372 THER/PROPH/DIAG INJ SC/IM: CPT

## 2024-05-22 PROCEDURE — C1769 GUIDE WIRE: HCPCS

## 2024-05-22 PROCEDURE — 85347 COAGULATION TIME ACTIVATED: CPT | Performed by: PHYSICIAN ASSISTANT

## 2024-05-22 PROCEDURE — C1887 CATHETER, GUIDING: HCPCS

## 2024-05-22 PROCEDURE — 84295 ASSAY OF SERUM SODIUM: CPT

## 2024-05-22 PROCEDURE — 85025 COMPLETE CBC W/AUTO DIFF WBC: CPT

## 2024-05-22 PROCEDURE — 80143 DRUG ASSAY ACETAMINOPHEN: CPT

## 2024-05-22 PROCEDURE — 36248 INS CATH ABD/L-EXT ART ADDL: CPT | Performed by: RADIOLOGY

## 2024-05-22 PROCEDURE — 84132 ASSAY OF SERUM POTASSIUM: CPT

## 2024-05-22 PROCEDURE — 96366 THER/PROPH/DIAG IV INF ADDON: CPT

## 2024-05-22 PROCEDURE — 82077 ASSAY SPEC XCP UR&BREATH IA: CPT

## 2024-05-22 PROCEDURE — 82948 REAGENT STRIP/BLOOD GLUCOSE: CPT

## 2024-05-22 PROCEDURE — C1894 INTRO/SHEATH, NON-LASER: HCPCS

## 2024-05-22 PROCEDURE — 86850 RBC ANTIBODY SCREEN: CPT

## 2024-05-22 PROCEDURE — 36415 COLL VENOUS BLD VENIPUNCTURE: CPT

## 2024-05-22 PROCEDURE — 70450 CT HEAD/BRAIN W/O DYE: CPT

## 2024-05-22 PROCEDURE — 85576 BLOOD PLATELET AGGREGATION: CPT | Performed by: PHYSICIAN ASSISTANT

## 2024-05-22 PROCEDURE — 85384 FIBRINOGEN ACTIVITY: CPT | Performed by: PHYSICIAN ASSISTANT

## 2024-05-22 PROCEDURE — 96365 THER/PROPH/DIAG IV INF INIT: CPT

## 2024-05-22 PROCEDURE — 82140 ASSAY OF AMMONIA: CPT

## 2024-05-22 PROCEDURE — 99291 CRITICAL CARE FIRST HOUR: CPT | Performed by: EMERGENCY MEDICINE

## 2024-05-22 PROCEDURE — 93005 ELECTROCARDIOGRAM TRACING: CPT

## 2024-05-22 PROCEDURE — 86920 COMPATIBILITY TEST SPIN: CPT

## 2024-05-22 PROCEDURE — 82947 ASSAY GLUCOSE BLOOD QUANT: CPT

## 2024-05-22 PROCEDURE — 82803 BLOOD GASES ANY COMBINATION: CPT

## 2024-05-22 PROCEDURE — 85018 HEMOGLOBIN: CPT

## 2024-05-22 PROCEDURE — 80179 DRUG ASSAY SALICYLATE: CPT

## 2024-05-22 PROCEDURE — 86901 BLOOD TYPING SEROLOGIC RH(D): CPT

## 2024-05-22 PROCEDURE — 84443 ASSAY THYROID STIM HORMONE: CPT

## 2024-05-22 PROCEDURE — 99285 EMERGENCY DEPT VISIT HI MDM: CPT

## 2024-05-22 PROCEDURE — 71046 X-RAY EXAM CHEST 2 VIEWS: CPT

## 2024-05-22 PROCEDURE — 76937 US GUIDE VASCULAR ACCESS: CPT

## 2024-05-22 PROCEDURE — 99291 CRITICAL CARE FIRST HOUR: CPT | Performed by: STUDENT IN AN ORGANIZED HEALTH CARE EDUCATION/TRAINING PROGRAM

## 2024-05-22 PROCEDURE — 76937 US GUIDE VASCULAR ACCESS: CPT | Performed by: RADIOLOGY

## 2024-05-22 PROCEDURE — 84100 ASSAY OF PHOSPHORUS: CPT

## 2024-05-22 PROCEDURE — 96375 TX/PRO/DX INJ NEW DRUG ADDON: CPT

## 2024-05-22 PROCEDURE — 82607 VITAMIN B-12: CPT

## 2024-05-22 RX ORDER — SODIUM CHLORIDE, SODIUM GLUCONATE, SODIUM ACETATE, POTASSIUM CHLORIDE, MAGNESIUM CHLORIDE, SODIUM PHOSPHATE, DIBASIC, AND POTASSIUM PHOSPHATE .53; .5; .37; .037; .03; .012; .00082 G/100ML; G/100ML; G/100ML; G/100ML; G/100ML; G/100ML; G/100ML
100 INJECTION, SOLUTION INTRAVENOUS CONTINUOUS
Status: DISCONTINUED | OUTPATIENT
Start: 2024-05-22 | End: 2024-05-23

## 2024-05-22 RX ORDER — ALBUMIN, HUMAN INJ 5% 5 %
SOLUTION INTRAVENOUS CONTINUOUS PRN
Status: DISCONTINUED | OUTPATIENT
Start: 2024-05-22 | End: 2024-05-22

## 2024-05-22 RX ORDER — PHYTONADIONE 10 MG/ML
10 INJECTION, EMULSION INTRAMUSCULAR; INTRAVENOUS; SUBCUTANEOUS ONCE
Status: COMPLETED | OUTPATIENT
Start: 2024-05-22 | End: 2024-05-22

## 2024-05-22 RX ORDER — MIDAZOLAM HYDROCHLORIDE 2 MG/2ML
INJECTION, SOLUTION INTRAMUSCULAR; INTRAVENOUS AS NEEDED
Status: DISCONTINUED | OUTPATIENT
Start: 2024-05-22 | End: 2024-05-22

## 2024-05-22 RX ORDER — ONDANSETRON 2 MG/ML
4 INJECTION INTRAMUSCULAR; INTRAVENOUS EVERY 4 HOURS PRN
Status: DISCONTINUED | OUTPATIENT
Start: 2024-05-22 | End: 2024-05-28 | Stop reason: HOSPADM

## 2024-05-22 RX ORDER — PHENYLEPHRINE HCL IN 0.9% NACL 1 MG/10 ML
SYRINGE (ML) INTRAVENOUS AS NEEDED
Status: DISCONTINUED | OUTPATIENT
Start: 2024-05-22 | End: 2024-05-22

## 2024-05-22 RX ORDER — SODIUM CHLORIDE 9 MG/ML
INJECTION, SOLUTION INTRAVENOUS CONTINUOUS PRN
Status: DISCONTINUED | OUTPATIENT
Start: 2024-05-22 | End: 2024-05-22

## 2024-05-22 RX ORDER — SUCCINYLCHOLINE/SOD CL,ISO/PF 100 MG/5ML
SYRINGE (ML) INTRAVENOUS AS NEEDED
Status: DISCONTINUED | OUTPATIENT
Start: 2024-05-22 | End: 2024-05-22

## 2024-05-22 RX ORDER — LIDOCAINE WITH 8.4% SOD BICARB 0.9%(10ML)
SYRINGE (ML) INJECTION AS NEEDED
Status: COMPLETED | OUTPATIENT
Start: 2024-05-22 | End: 2024-05-22

## 2024-05-22 RX ORDER — SODIUM CHLORIDE, SODIUM LACTATE, POTASSIUM CHLORIDE, CALCIUM CHLORIDE 600; 310; 30; 20 MG/100ML; MG/100ML; MG/100ML; MG/100ML
INJECTION, SOLUTION INTRAVENOUS CONTINUOUS PRN
Status: DISCONTINUED | OUTPATIENT
Start: 2024-05-22 | End: 2024-05-22

## 2024-05-22 RX ORDER — IODIXANOL 320 MG/ML
200 INJECTION, SOLUTION INTRAVASCULAR
Status: COMPLETED | OUTPATIENT
Start: 2024-05-22 | End: 2024-05-22

## 2024-05-22 RX ORDER — LORAZEPAM 2 MG/ML
2 INJECTION INTRAMUSCULAR ONCE
Status: DISCONTINUED | OUTPATIENT
Start: 2024-05-22 | End: 2024-05-22

## 2024-05-22 RX ORDER — SODIUM CHLORIDE, SODIUM GLUCONATE, SODIUM ACETATE, POTASSIUM CHLORIDE, MAGNESIUM CHLORIDE, SODIUM PHOSPHATE, DIBASIC, AND POTASSIUM PHOSPHATE .53; .5; .37; .037; .03; .012; .00082 G/100ML; G/100ML; G/100ML; G/100ML; G/100ML; G/100ML; G/100ML
1000 INJECTION, SOLUTION INTRAVENOUS ONCE
Status: COMPLETED | OUTPATIENT
Start: 2024-05-22 | End: 2024-05-22

## 2024-05-22 RX ORDER — INSULIN LISPRO 100 [IU]/ML
1-6 INJECTION, SOLUTION INTRAVENOUS; SUBCUTANEOUS EVERY 6 HOURS SCHEDULED
Status: DISCONTINUED | OUTPATIENT
Start: 2024-05-22 | End: 2024-05-23

## 2024-05-22 RX ORDER — PROPOFOL 10 MG/ML
INJECTION, EMULSION INTRAVENOUS AS NEEDED
Status: DISCONTINUED | OUTPATIENT
Start: 2024-05-22 | End: 2024-05-22

## 2024-05-22 RX ORDER — HYDROMORPHONE HCL IN WATER/PF 6 MG/30 ML
0.2 PATIENT CONTROLLED ANALGESIA SYRINGE INTRAVENOUS EVERY 2 HOUR PRN
Status: DISCONTINUED | OUTPATIENT
Start: 2024-05-22 | End: 2024-05-23

## 2024-05-22 RX ORDER — CHLORHEXIDINE GLUCONATE ORAL RINSE 1.2 MG/ML
15 SOLUTION DENTAL EVERY 12 HOURS SCHEDULED
Status: DISCONTINUED | OUTPATIENT
Start: 2024-05-22 | End: 2024-05-28 | Stop reason: HOSPADM

## 2024-05-22 RX ORDER — FOLIC ACID 1 MG/1
1 TABLET ORAL DAILY
Status: DISCONTINUED | OUTPATIENT
Start: 2024-05-23 | End: 2024-05-28 | Stop reason: HOSPADM

## 2024-05-22 RX ORDER — LIDOCAINE HYDROCHLORIDE 10 MG/ML
INJECTION, SOLUTION EPIDURAL; INFILTRATION; INTRACAUDAL; PERINEURAL AS NEEDED
Status: DISCONTINUED | OUTPATIENT
Start: 2024-05-22 | End: 2024-05-22

## 2024-05-22 RX ORDER — FENTANYL CITRATE 50 UG/ML
INJECTION, SOLUTION INTRAMUSCULAR; INTRAVENOUS AS NEEDED
Status: DISCONTINUED | OUTPATIENT
Start: 2024-05-22 | End: 2024-05-22

## 2024-05-22 RX ORDER — LANOLIN ALCOHOL/MO/W.PET/CERES
100 CREAM (GRAM) TOPICAL DAILY
Status: DISCONTINUED | OUTPATIENT
Start: 2024-05-23 | End: 2024-05-28 | Stop reason: HOSPADM

## 2024-05-22 RX ORDER — ROCURONIUM BROMIDE 10 MG/ML
INJECTION, SOLUTION INTRAVENOUS AS NEEDED
Status: DISCONTINUED | OUTPATIENT
Start: 2024-05-22 | End: 2024-05-22

## 2024-05-22 RX ORDER — MAGNESIUM SULFATE HEPTAHYDRATE 40 MG/ML
2 INJECTION, SOLUTION INTRAVENOUS ONCE
Status: COMPLETED | OUTPATIENT
Start: 2024-05-22 | End: 2024-05-22

## 2024-05-22 RX ORDER — OXYCODONE HYDROCHLORIDE 5 MG/1
5 TABLET ORAL EVERY 4 HOURS PRN
Status: DISCONTINUED | OUTPATIENT
Start: 2024-05-22 | End: 2024-05-28 | Stop reason: HOSPADM

## 2024-05-22 RX ORDER — ATORVASTATIN CALCIUM 10 MG/1
10 TABLET, FILM COATED ORAL
Status: DISCONTINUED | OUTPATIENT
Start: 2024-05-22 | End: 2024-05-28 | Stop reason: HOSPADM

## 2024-05-22 RX ORDER — ACETAMINOPHEN 325 MG/1
650 TABLET ORAL EVERY 6 HOURS PRN
Status: DISCONTINUED | OUTPATIENT
Start: 2024-05-22 | End: 2024-05-28 | Stop reason: HOSPADM

## 2024-05-22 RX ORDER — FENOFIBRATE 145 MG/1
145 TABLET, COATED ORAL DAILY
Status: DISCONTINUED | OUTPATIENT
Start: 2024-05-23 | End: 2024-05-28 | Stop reason: HOSPADM

## 2024-05-22 RX ORDER — LEVETIRACETAM 500 MG/1
1000 TABLET ORAL 2 TIMES DAILY
Status: DISCONTINUED | OUTPATIENT
Start: 2024-05-22 | End: 2024-05-24

## 2024-05-22 RX ORDER — FLUOXETINE HYDROCHLORIDE 20 MG/1
20 CAPSULE ORAL DAILY
Status: DISCONTINUED | OUTPATIENT
Start: 2024-05-23 | End: 2024-05-24

## 2024-05-22 RX ORDER — SODIUM CHLORIDE, SODIUM GLUCONATE, SODIUM ACETATE, POTASSIUM CHLORIDE, MAGNESIUM CHLORIDE, SODIUM PHOSPHATE, DIBASIC, AND POTASSIUM PHOSPHATE .53; .5; .37; .037; .03; .012; .00082 G/100ML; G/100ML; G/100ML; G/100ML; G/100ML; G/100ML; G/100ML
500 INJECTION, SOLUTION INTRAVENOUS ONCE
Status: COMPLETED | OUTPATIENT
Start: 2024-05-22 | End: 2024-05-22

## 2024-05-22 RX ORDER — AMOXICILLIN 250 MG
1 CAPSULE ORAL
Status: DISCONTINUED | OUTPATIENT
Start: 2024-05-22 | End: 2024-05-28 | Stop reason: HOSPADM

## 2024-05-22 RX ADMIN — FENTANYL CITRATE 25 MCG: 50 INJECTION INTRAMUSCULAR; INTRAVENOUS at 12:30

## 2024-05-22 RX ADMIN — FENTANYL CITRATE 25 MCG: 50 INJECTION INTRAMUSCULAR; INTRAVENOUS at 12:25

## 2024-05-22 RX ADMIN — MIDAZOLAM 2 MG: 1 INJECTION INTRAMUSCULAR; INTRAVENOUS at 11:13

## 2024-05-22 RX ADMIN — IODIXANOL 80 ML: 320 INJECTION, SOLUTION INTRAVASCULAR at 12:34

## 2024-05-22 RX ADMIN — ALBUMIN (HUMAN): 12.5 INJECTION, SOLUTION INTRAVENOUS at 11:40

## 2024-05-22 RX ADMIN — LEVETIRACETAM 1000 MG: 500 TABLET, FILM COATED ORAL at 18:12

## 2024-05-22 RX ADMIN — PHENYLEPHRINE HYDROCHLORIDE 50 MCG/MIN: 10 INJECTION INTRAVENOUS at 11:38

## 2024-05-22 RX ADMIN — SODIUM CHLORIDE, SODIUM LACTATE, POTASSIUM CHLORIDE, AND CALCIUM CHLORIDE 1000 ML: .6; .31; .03; .02 INJECTION, SOLUTION INTRAVENOUS at 13:27

## 2024-05-22 RX ADMIN — PHYTONADIONE 10 MG: 10 INJECTION, EMULSION INTRAMUSCULAR; INTRAVENOUS; SUBCUTANEOUS at 09:36

## 2024-05-22 RX ADMIN — SUGAMMADEX 200 MG: 100 INJECTION, SOLUTION INTRAVENOUS at 12:19

## 2024-05-22 RX ADMIN — SODIUM CHLORIDE 8 MCG: 9 INJECTION, SOLUTION INTRAVENOUS at 12:05

## 2024-05-22 RX ADMIN — ROCURONIUM BROMIDE 50 MG: 10 INJECTION, SOLUTION INTRAVENOUS at 11:29

## 2024-05-22 RX ADMIN — LIDOCAINE HYDROCHLORIDE 5 ML: 10 INJECTION, SOLUTION EPIDURAL; INFILTRATION; INTRACAUDAL; PERINEURAL at 11:20

## 2024-05-22 RX ADMIN — Medication 10 ML: at 11:41

## 2024-05-22 RX ADMIN — FENTANYL CITRATE 25 MCG: 50 INJECTION INTRAMUSCULAR; INTRAVENOUS at 12:27

## 2024-05-22 RX ADMIN — SODIUM CHLORIDE, SODIUM GLUCONATE, SODIUM ACETATE, POTASSIUM CHLORIDE, MAGNESIUM CHLORIDE, SODIUM PHOSPHATE, DIBASIC, AND POTASSIUM PHOSPHATE 100 ML/HR: .53; .5; .37; .037; .03; .012; .00082 INJECTION, SOLUTION INTRAVENOUS at 16:01

## 2024-05-22 RX ADMIN — PROPOFOL 150 MG: 10 INJECTION, EMULSION INTRAVENOUS at 11:21

## 2024-05-22 RX ADMIN — CHLORHEXIDINE GLUCONATE 15 ML: 1.2 SOLUTION ORAL at 22:00

## 2024-05-22 RX ADMIN — SODIUM CHLORIDE: 9 INJECTION, SOLUTION INTRAVENOUS at 11:30

## 2024-05-22 RX ADMIN — SODIUM CHLORIDE 8 MCG: 9 INJECTION, SOLUTION INTRAVENOUS at 12:20

## 2024-05-22 RX ADMIN — MAGNESIUM SULFATE HEPTAHYDRATE 2 G: 40 INJECTION, SOLUTION INTRAVENOUS at 17:20

## 2024-05-22 RX ADMIN — FENTANYL CITRATE 25 MCG: 50 INJECTION INTRAMUSCULAR; INTRAVENOUS at 12:33

## 2024-05-22 RX ADMIN — SODIUM CHLORIDE 8 MCG: 9 INJECTION, SOLUTION INTRAVENOUS at 11:55

## 2024-05-22 RX ADMIN — PHENOBARBITAL SODIUM 753 MG: 65 INJECTION INTRAMUSCULAR at 16:01

## 2024-05-22 RX ADMIN — ONDANSETRON 4 MG: 2 INJECTION INTRAMUSCULAR; INTRAVENOUS at 12:05

## 2024-05-22 RX ADMIN — Medication 100 MG: at 11:22

## 2024-05-22 RX ADMIN — Medication 100 MCG: at 11:25

## 2024-05-22 RX ADMIN — ATORVASTATIN CALCIUM 10 MG: 10 TABLET, FILM COATED ORAL at 16:32

## 2024-05-22 RX ADMIN — SODIUM CHLORIDE, SODIUM GLUCONATE, SODIUM ACETATE, POTASSIUM CHLORIDE, MAGNESIUM CHLORIDE, SODIUM PHOSPHATE, DIBASIC, AND POTASSIUM PHOSPHATE 500 ML: .53; .5; .37; .037; .03; .012; .00082 INJECTION, SOLUTION INTRAVENOUS at 17:19

## 2024-05-22 RX ADMIN — SODIUM CHLORIDE, SODIUM GLUCONATE, SODIUM ACETATE, POTASSIUM CHLORIDE, MAGNESIUM CHLORIDE, SODIUM PHOSPHATE, DIBASIC, AND POTASSIUM PHOSPHATE 1000 ML: .53; .5; .37; .037; .03; .012; .00082 INJECTION, SOLUTION INTRAVENOUS at 07:52

## 2024-05-22 RX ADMIN — Medication 100 MCG: at 11:35

## 2024-05-22 RX ADMIN — Medication 100 MCG: at 11:30

## 2024-05-22 RX ADMIN — IOHEXOL 100 ML: 350 INJECTION, SOLUTION INTRAVENOUS at 09:11

## 2024-05-22 RX ADMIN — SODIUM CHLORIDE, SODIUM LACTATE, POTASSIUM CHLORIDE, AND CALCIUM CHLORIDE: .6; .31; .03; .02 INJECTION, SOLUTION INTRAVENOUS at 11:13

## 2024-05-22 RX ADMIN — Medication 1500 UNITS: at 09:53

## 2024-05-22 RX ADMIN — SODIUM CHLORIDE 8 MCG: 9 INJECTION, SOLUTION INTRAVENOUS at 12:28

## 2024-05-22 NOTE — TELEMEDICINE
e-Consult (IPC)  - Interventional Radiology  Brian Lal 68 y.o. male MRN: [id number]  Unit/Bed#: ED 20 Encounter: [id number]          Interventional Radiology has been consulted to evaluate Brian Lal    We were consulted by trauma concerning this patient with fall yesterday now with concern for drop in hemoglobin by 4 g from 4 days ago down from 14.7 to 106.  Patient has active bleeding in right thigh as identified by CTA performed this morning.  Of note the patient has a history of CVA with a IVC filter and is on warfarin.  His INR today is 4.5.  This was reportedly corrected and the patient was given Kcentra.  There is concern that the patient has a history of alcoholism as well contributing to his coagulopathy.  He is noted have alcohol in his system today.  IR has been consulted to evaluate the patient for possible intervention.     Inpatient Consult to IR  Consult performed by: Osman Saenz PA-C  Consult ordered by: Dougie Kelley MD        05/22/24    Assessment/Recommendation:     R Thigh Hematoma with Extravasation  -Plan for angiography with possible invention, urgently  -Will require anesthesia support  - Case reviewed with Dr. Rivera      11-20 minutes, >50% of the total time devoted to medical consultative verbal/EMR discussion between providers. Written report will be generated in the EMR.     Thank you for allowing Interventional Radiology to participate in the care of Brian Lal. Please don't hesitate to call or TigerText us with any questions.     Osman Saenz PA-C

## 2024-05-22 NOTE — PROGRESS NOTES
[facility]  Progress Note: Critical Care  Name: Brian Lal 68 y.o. male I MRN: [id number]  Unit/Bed#: ICU 07 I Date of Admission: 5/22/2024   Date of Service: 5/22/2024 I Hospital Day: 0    Assessment & Plan   Neuro:   Hx of intracerebral hemorrhage, SDH in 2016 with subsequent seizures  Per chart review, had DVT in RLE in 2016 and underwent thrombolysis. Developed severe headache, CT scan with diffuse ICH. During same hospitalization had witnessed seizure activity  Continue home Keppra 1g BID  Trend neuro exam - baseline L sided weakness  Alcohol abuse  Reportedly drinks 4 days a week and approximately 6 beers per day  Thiamine/folate/MVI  CIWA protocol - given 10mg/kg IBW phenobarbital  Depression  Home prozac  Acute pain  Prn tylenol  Daily CAM-ICU, delirium precautions. Regulate sleep/wake cycle.     CV:   HTN  Hold home metoprolol, lisinopril  HLD  Continue home tricor, atorvastatin    Pulm:  Hx of DVT/PE on Coumadin d/t Factor V Leiden  Hold home coumadin in setting of gluteal hematoma    GI:   Bowel regimen  Senokot    :   CKD  Baseline creatinine 1.6-1.7  Trend strict I/Os    F/E/N:   Maintenance fluids: isolyte at 100cc/hr  Hyponatremia  Has had hyponatremia in the past requiring salt tabs  Trend daily BMP  NPO    Heme/Onc:   Traumatic 13cm posterolateral proximal R thigh hematoma with active extravasation  S/p IR embolization 5/22  Q6 hgb checks  Received 2u prbcs 5/22  Hold DVT ppx    Endo:   DM2  Hold home jardiance , glipizide  SSI    ID:   No active issues    MSK/Skin:   Gluteal hematoma  Monitor size/character. Outlined hematoma for reference. Continue abdominal binder and pressure over site.     Disposition: Critical care    ICU Core Measures     A: Assess, Prevent, and Manage Pain Has pain been assessed? Yes  Need for changes to pain regimen? No   B: Both SAT/SAT  N/A   C: Choice of Sedation RASS Goal: N/A patient not on sedation  Need for changes to  sedation or analgesia regimen? NA   D: Delirium CAM-ICU: Negative   E: Early Mobility  Plan for early mobility? Yes   F: Family Engagement Plan for family engagement today? Yes         Prophylaxis:  VTE VTE covered by:    None       Stress Ulcer  not ordered        Significant 24hr Events     HPI: 67 y/o M w/ PMHx of DVT s/p thrombolysis c/b ICH/SDH and subsequent seizures, Factor V Leiden w/ DVT and PE history on coumadin, DM2, HTN, HLD, alcohol abuse, and depression who presented to the emergency department today after a fall yesterday while cleaning his pool. He is unsure if he became dizzy prior to the fall, unclear headstrike. Fell directly onto his right hip. Today, he continued to have pain and difficulty ambulating prompting ER evaluation. Initial CTA of RLE showed a 13cm hematoma of the posterolateral proximal right thigh with two separate foci of active bleeding. He received Vitamin K and PCC for coumadin reversal and was taken to IR for embolization of two proximal profunda branches, though no active bleeding was noted. He received 2u prbcs during procedure.      Subjective     Review of Systems   Musculoskeletal:  Positive for gait problem.        R thigh/gluteal pain        Objective                            Vitals I/O      Most Recent Min/Max in 24hrs   Temp 98.3 °F (36.8 °C) Temp  Min: 97.5 °F (36.4 °C)  Max: 98.6 °F (37 °C)   Pulse 72 Pulse  Min: 64  Max: 83   Resp 13 Resp  Min: 13  Max: 22   /75 BP  Min: 95/52  Max: 139/75   O2 Sat 97 % SpO2  Min: 95 %  Max: 98 %      Intake/Output Summary (Last 24 hours) at 5/22/2024 1659  Last data filed at 5/22/2024 1601  Gross per 24 hour   Intake 3000 ml   Output 2500 ml   Net 500 ml       Diet NPO    Invasive Monitoring   Arterial Line  [place] BP 94/42  Arterial Line BP  Min: 94/42  Max: 168/70   MAP (!) 60 mmHg  Arterial Line MAP (mmHg)  Min: 60 mmHg  Max: 102 mmHg           Physical Exam   Physical Exam  Vitals and nursing note reviewed.   Eyes:       Pupils: Pupils are equal, round, and reactive to light.   Skin:     General: Skin is warm and dry.   HENT:      Head: Normocephalic and atraumatic.   Cardiovascular:      Rate and Rhythm: Normal rate and regular rhythm.      Pulses:           Radial pulses are 2+ on the right side and 2+ on the left side.        Dorsalis pedis pulses are 2+ on the right side and 2+ on the left side.      Heart sounds: Normal heart sounds.   Musculoskeletal:      Right lower leg: No edema.      Left lower leg: No edema.      Comments: R gluteal hematoma w/ diffuse ecchymosis. L groin IR site C/D/I w/ dressing. Soft.    Abdominal: General: There is no distension.      Palpations: Abdomen is soft.      Tenderness: There is no abdominal tenderness.   Constitutional:       General: He is not in acute distress.     Appearance: He is overweight.      Interventions: Nasal cannula in place.   Pulmonary:      Effort: Pulmonary effort is normal.      Breath sounds: Normal breath sounds.   Psychiatric:         Behavior: Behavior is cooperative.   Neurological:      General: No focal deficit present.      Mental Status: He is alert and oriented to person, place, and time.      Comments: Mild left sided weakness (chronic)  Overall diffuse tremors, mild tongue fasciculations            Diagnostic Studies        Imaging: CTA R thigh: 13 cm hematoma extends through the posterolateral proximal right thigh. There are 2 separate foci of active bleeding.  I have personally reviewed pertinent reports.       Medications:  Scheduled PRN   atorvastatin, 10 mg, Daily With Dinner  chlorhexidine, 15 mL, Q12H TONY  [START ON 5/23/2024] fenofibrate, 145 mg, Daily  [START ON 5/23/2024] FLUoxetine, 20 mg, Daily  [START ON 5/23/2024] folic acid, 1 mg, Daily  insulin lispro, 1-6 Units, Q6H TONY  levETIRAcetam, 1,000 mg, BID  magnesium sulfate, 2 g, Once  multi-electrolyte, 500 mL, Once  [START ON 5/23/2024] multivitamin-minerals, 1 tablet, Daily  senna-docusate  sodium, 1 tablet, HS  [START ON 5/23/2024] thiamine, 100 mg, Daily      acetaminophen, 650 mg, Q6H PRN  HYDROmorphone, 0.2 mg, Q2H PRN  naloxone, 0.04 mg, Q1MIN PRN  ondansetron, 4 mg, Q4H PRN  oxyCODONE, 2.5 mg, Q4H PRN   Or  oxyCODONE, 5 mg, Q4H PRN       Continuous    multi-electrolyte, 100 mL/hr, Last Rate: 100 mL/hr (05/22/24 1601)         Labs:    CBC    Recent Labs     05/22/24  0749 05/22/24  1137 05/22/24  1515   WBC 9.75  --  6.72   HGB 10.6* 9.2* 10.6*   HCT 31.7* 27* 31.4*     --  199     BMP    Recent Labs     05/22/24  0749 05/22/24  1137 05/22/24  1515   SODIUM 127*  --  135   K 4.3  --  4.2   CL 99  --  107   CO2 17* 19* 21   AGAP 11  --  7   BUN 23  --  18   CREATININE 1.50*  --  1.19   CALCIUM 8.1*  --  8.3*       Coags    Recent Labs     05/22/24  0749 05/22/24  1515   INR 4.57* 2.15*        Additional Electrolytes  Recent Labs     05/22/24  1137 05/22/24  1515   MG  --  1.8*   PHOS  --  3.5   CAIONIZED 1.23  --           Blood Gas    Recent Labs     05/22/24  1515   PHART 7.407   [id number] 33.3*   PO2ART 108.6   [id number] 20.5*   BEART -3.5   SOURCE Line, Arterial     Recent Labs     05/22/24  1515   SOURCE Line, Arterial    LFTs  Recent Labs     05/22/24  0749 05/22/24  1515   ALT 14 13   AST 17 14   ALKPHOS 28* 24*   ALB 3.2* 3.4*   TBILI 0.71 1.53*       Infectious  No recent results  Glucose  Recent Labs     05/22/24  0749 05/22/24  1515   GLUC 138 73               Irma Rojas PA-C

## 2024-05-22 NOTE — ED PROVIDER NOTES
History  Chief Complaint   Patient presents with    Altered Mental Status     Wife reports increased AMS to EMS. Pt has hx of stroke. Pt admits to alcohol consumption daily; last drink was this AM.      Patient is a 68-year-old male with history of stroke, seizures, hypertension, factor V deficiency, hyperlipidemia, DVT, and diabetes who presents for altered mental status.  Patient arrives via EMS.  Per EMS, the wife reported that this morning patient was unsteady getting out of a chair this morning and that she noted that he had been drinking.  Patient says that he had 1 can of Truley and estimates that he drank about an additional 5 to 6 cans last night.  When EMS arrived at his home, they noted his blood pressure to be in the 70s systolic which improved to 90 systolic after a small fluid bolus.  He currently has no complaints and thinks that his wife is overreacting.    Patient's wife at bedside states that patient is a chronic drinker and tends to hide it from his family.  She notes that yesterday he fell into the pool while intoxicated but did not sustain any head strike or loss of conscious.  She says that last night while she was out getting groceries she thinks that he fell again because she found a chair knocked over in the kitchen.  Patient also had another fall a few days ago and sustained a bruise to his right leg that the patient's wife was treating with antibiotic ointment.  Patient's wife has been concerned because he has been more withdrawn and less active over the past few days.  She is also concerned because he has been taking his medications inappropriately including his warfarin and Ativan which is not normal for him.        Prior to Admission Medications   Prescriptions Last Dose Informant Patient Reported? Taking?   Cholecalciferol (VITAMIN D3) 1000 units CAPS  Spouse/Significant Other Yes No   Sig: i po daily   Cyanocobalamin (VITAMIN B-12 PO)  Spouse/Significant Other Yes No   Sig: Take 1,500  Units by mouth 3 (three) times a week    FLUoxetine (PROzac) 20 mg capsule  Spouse/Significant Other Yes No   Sig: TAKE 1 CAPSULE BY ORAL ROUTE EVERY DAY IN THE MORNING   Patient not taking: Reported on 11/18/2021   Jardiance 25 MG TABS   Yes No   Sig: Take 25 mg by mouth daily   LORazepam (ATIVAN) 0.5 mg tablet  Spouse/Significant Other Yes No   Sig: Take 0.5 mg by mouth if needed   aspirin 81 MG tablet  Spouse/Significant Other Yes No   Sig: Take 81 mg by mouth   atorvastatin (LIPITOR) 10 mg tablet  Spouse/Significant Other Yes No   Sig: Take 10 mg by mouth daily   cloNIDine (CATAPRES) 0.1 mg tablet  Spouse/Significant Other Yes No   Sig: Take 0.1 mg by mouth 2 (two) times a day   Patient not taking: Reported on 11/18/2021    fenofibrate (TRICOR) 54 MG tablet  Spouse/Significant Other Yes No   Sig: Take 160 mg by mouth daily    fenofibrate 160 MG tablet   Yes No   Sig: Take 160 mg by mouth daily   glipiZIDE (GLUCOTROL XL) 10 mg 24 hr tablet  Spouse/Significant Other Yes No   Sig: Take 10 mg by mouth daily with breakfast 10 mg in am, 5mg in PM   levETIRAcetam (KEPPRA) 1000 MG tablet   No No   Sig: By mouth take half tablet in the morning and 1 full tablet in the evening daily   lisinopril (ZESTRIL) 20 mg tablet  Spouse/Significant Other Yes No   Sig: Take 10 mg by mouth daily   lisinopril (ZESTRIL) 5 mg tablet   Yes No   Sig: Take 5 mg by mouth daily   metFORMIN (GLUCOPHAGE) 500 mg tablet  Spouse/Significant Other Yes No   Sig: Take 500 mg by mouth 2 (two) times a day     Patient not taking: Reported on 11/18/2021    metoprolol succinate (TOPROL-XL) 25 mg 24 hr tablet   Yes No   Sig: Take 25 mg by mouth daily   metoprolol succinate (TOPROL-XL) 50 mg 24 hr tablet   Yes No   Sig: Take 50 mg by mouth daily   repaglinide (PRANDIN) 1 mg tablet  Spouse/Significant Other Yes No   Sig: TAKE 1 TABLET BY MOUTH 3 TIMES EVERY DAY 15 TO 30 MINUTES BEFORE MEALS   sildenafil (VIAGRA) 100 mg tablet  Spouse/Significant Other Yes No    Sig: As needed for ED.   Patient not taking: Reported on 2021   thiamine (VITAMIN B1) 100 mg tablet  Spouse/Significant Other Yes No   Sig: i po daily   warfarin (COUMADIN) 1 mg tablet  Spouse/Significant Other Yes No   Sig: take daily per warfarin protocol   Patient not taking: Reported on 2021   warfarin (COUMADIN) 4 mg tablet  Spouse/Significant Other Yes No   Sig: Take 4 mg by mouth 3.5   3 times a week, 4mg 4 x a week   warfarin (COUMADIN) 5 mg tablet  Spouse/Significant Other Yes No   Sig: Take 5 mg by mouth daily Everyday   Patient not taking: Reported on 2021       Facility-Administered Medications: None       Past Medical History:   Diagnosis Date    Diabetes (HCC)     Diabetes mellitus (HCC)     DVT (deep venous thrombosis) (HCC)     Dyslipidemia     Factor V deficiency (HCC)     Hypertension     Lobar cerebral hemorrhage (HCC)     Multiple pulmonary emboli (HCC)     Seizures (HCC) 2017    Stroke (HCC) 2016       Past Surgical History:   Procedure Laterality Date    IVC FILTER INSERTION      IVC umbrella    PROSTATE BIOPSY Bilateral 2018    Dr. Riley        Family History   Problem Relation Age of Onset    Kidney disease Father     Hypertension Father     Diabetes Father     Heart attack Father     Stroke Father     Hypertension Mother     Heart attack Mother     Hypertension Brother      I have reviewed and agree with the history as documented.    E-Cigarette/Vaping     E-Cigarette/Vaping Substances     Social History     Tobacco Use    Smoking status: Former     Current packs/day: 0.00     Types: Cigarettes     Quit date: 1989     Years since quittin.4    Smokeless tobacco: Never   Substance Use Topics    Alcohol use: No     Comment: 10 beer/week    Drug use: No        Review of Systems   Constitutional:  Negative for chills and fever.   HENT:  Negative for congestion, rhinorrhea and sore throat.    Eyes:  Negative for pain and redness.   Respiratory:   Negative for cough and shortness of breath.    Cardiovascular:  Negative for chest pain and palpitations.   Gastrointestinal:  Negative for abdominal pain, constipation, diarrhea, nausea and vomiting.   Genitourinary:  Negative for dysuria and hematuria.   Musculoskeletal:  Negative for back pain and neck pain.   Skin:  Negative for pallor and rash.   Neurological:  Positive for weakness. Negative for numbness.   All other systems reviewed and are negative.      Physical Exam  ED Triage Vitals   Temperature Pulse Respirations Blood Pressure SpO2   05/22/24 0720 05/22/24 0720 05/22/24 0720 05/22/24 0720 05/22/24 0720   98.6 °F (37 °C) 83 18 115/59 98 %      Temp Source Heart Rate Source Patient Position - Orthostatic VS BP Location FiO2 (%)   05/22/24 0720 05/22/24 0720 05/22/24 2000 05/22/24 2000 --   Oral Monitor Lying Left arm       Pain Score       05/22/24 1245       No Pain             Orthostatic Vital Signs  Vitals:    05/22/24 1830 05/22/24 2000 05/22/24 2100 05/22/24 2130   BP: 101/59 113/60 109/61 110/52   Pulse: 70 72 70 86   Patient Position - Orthostatic VS:  Lying         Physical Exam  Vitals and nursing note reviewed.   Constitutional:       General: He is not in acute distress.     Appearance: Normal appearance. He is well-developed and normal weight. He is not ill-appearing, toxic-appearing or diaphoretic.   HENT:      Head: Normocephalic and atraumatic.      Right Ear: External ear normal.      Left Ear: External ear normal.      Nose: Nose normal. No congestion or rhinorrhea.      Mouth/Throat:      Mouth: Mucous membranes are moist.      Pharynx: Oropharynx is clear. No oropharyngeal exudate or posterior oropharyngeal erythema.   Eyes:      General: No scleral icterus.        Right eye: No discharge.         Left eye: No discharge.      Extraocular Movements: Extraocular movements intact.      Right eye: Normal extraocular motion and no nystagmus.      Left eye: Normal extraocular motion and no  nystagmus.      Conjunctiva/sclera: Conjunctivae normal.      Pupils: Pupils are equal, round, and reactive to light.   Cardiovascular:      Rate and Rhythm: Normal rate and regular rhythm.      Pulses: Normal pulses.      Heart sounds: Normal heart sounds. No murmur heard.     No friction rub. No gallop.   Pulmonary:      Effort: Pulmonary effort is normal. No respiratory distress.      Breath sounds: Normal breath sounds. No stridor. No wheezing, rhonchi or rales.   Abdominal:      General: Abdomen is flat. Bowel sounds are normal. There is no distension.      Palpations: Abdomen is soft.      Tenderness: There is no abdominal tenderness. There is no right CVA tenderness, left CVA tenderness or guarding.   Musculoskeletal:         General: No tenderness.      Cervical back: Normal range of motion and neck supple. No rigidity or tenderness.      Right lower leg: No edema.      Left lower leg: No edema.      Comments: There is a large area of bruising over the right posterior and lateral thigh.  There is firmness of the lateral thigh with no overlying abrasion.  No active bleeding.   Skin:     General: Skin is warm and dry.      Capillary Refill: Capillary refill takes less than 2 seconds.      Coloration: Skin is not jaundiced or pale.   Neurological:      General: No focal deficit present.      Mental Status: He is alert.      GCS: GCS eye subscore is 4. GCS verbal subscore is 5. GCS motor subscore is 6.      Cranial Nerves: No cranial nerve deficit.      Sensory: No sensory deficit.      Motor: No weakness.      Comments: Oriented to person place but not time.  Left sided neglect   Psychiatric:         Mood and Affect: Mood normal.         Behavior: Behavior normal.         ED Medications  Medications   chlorhexidine (PERIDEX) 0.12 % oral rinse 15 mL (has no administration in time range)   naloxone (NARCAN) 0.04 mg/mL syringe 0.04 mg (has no administration in time range)   oxyCODONE (ROXICODONE) split tablet 2.5  mg (has no administration in time range)     Or   oxyCODONE (ROXICODONE) IR tablet 5 mg (has no administration in time range)   HYDROmorphone HCl (DILAUDID) injection 0.2 mg (has no administration in time range)   senna-docusate sodium (SENOKOT S) 8.6-50 mg per tablet 1 tablet (1 tablet Oral Not Given 5/22/24 2115)   ondansetron (ZOFRAN) injection 4 mg (4 mg Intravenous Given 5/22/24 1205)   FLUoxetine (PROzac) capsule 20 mg (has no administration in time range)   atorvastatin (LIPITOR) tablet 10 mg (10 mg Oral Given 5/22/24 1632)   fenofibrate (TRICOR) tablet 145 mg (has no administration in time range)   levETIRAcetam (KEPPRA) tablet 1,000 mg (1,000 mg Oral Given 5/22/24 1812)   thiamine tablet 100 mg (has no administration in time range)   multi-electrolyte (PLASMALYTE-A/ISOLYTE-S PH 7.4) IV solution (100 mL/hr Intravenous New Bag 5/22/24 1601)   folic acid (FOLVITE) tablet 1 mg (has no administration in time range)   multivitamin-minerals (CENTRUM) tablet 1 tablet (has no administration in time range)   acetaminophen (TYLENOL) tablet 650 mg (has no administration in time range)   insulin lispro (HumALOG/ADMELOG) 100 units/mL subcutaneous injection 1-6 Units ( Subcutaneous Not Given 5/22/24 1801)   multi-electrolyte (ISOLYTE-S PH 7.4) bolus 1,000 mL (1,000 mL Intravenous New Bag 5/22/24 0752)   phytonadione (AQUA-MEPHYTON) 10 mg/mL SC/IM injection 10 mg (10 mg Subcutaneous Given 5/22/24 0936)   iohexol (OMNIPAQUE) 350 MG/ML injection (MULTI-DOSE) 100 mL (100 mL Intravenous Given 5/22/24 0911)   prothrombin complex concentrate (human) (Kcentra) 1,500 Units (1,500 Units Intravenous Given 5/22/24 0953)   lidocaine 1% buffered (10 mL Infiltration Given 5/22/24 1141)   iodixanol (VISIPAQUE) 320 MG/ML injection 200 mL (80 mL Intravenous Given 5/22/24 1234)   lactated ringers bolus 1,000 mL (0 mL Intravenous Stopped 5/22/24 1402)   PHENobarbital 753 mg in sodium chloride 0.9 % 100 mL IVPB (0 mg Intravenous Stopped  5/22/24 1631)   magnesium sulfate 2 g/50 mL IVPB (premix) 2 g (2 g Intravenous New Bag 5/22/24 1720)   multi-electrolyte (ISOLYTE-S PH 7.4) bolus 500 mL (0 mL Intravenous Stopped 5/22/24 1819)       Diagnostic Studies  Results Reviewed       Procedure Component Value Units Date/Time    HS Troponin I 4hr [[id number]]  (Normal) Collected: 05/22/24 1515    Lab Status: Final result Specimen: Blood from Line, Arterial Updated: 05/22/24 1548     hs TnI 4hr 4 ng/L      Delta 4hr hsTnI 1 ng/L     POCT Blood Gas (CG8+) [[id number]]  (Abnormal) Collected: 05/22/24 1137    Lab Status: Final result Specimen: Arterial Updated: 05/22/24 1423     pH, Art i-STAT 7.295     pCO2, Art i-STAT 36.3 mm HG      pO2, ART i-STAT 165.0 mm HG      BE, i-STAT -8 mmol/L      HCO3, Art i-STAT 17.7 mmol/L      CO2, i-STAT 19 mmol/L      O2 Sat, i-STAT 99 %      SODIUM, I-STAT 130 mmol/l      Potassium, i-STAT 4.4 mmol/L      Calcium, Ionized i-STAT 1.23 mmol/L      Hct, i-STAT 27 %      Hgb, i-STAT 9.2 g/dl      Glucose, i-STAT 92 mg/dl      Specimen Type ARTERIAL    Fingerstick Glucose (POCT) [[id number]]  (Normal) Collected: 05/22/24 1336    Lab Status: Final result Specimen: Blood Updated: 05/22/24 1422     POC Glucose 77 mg/dl     TSH, 3rd generation with Free T4 reflex [[id number]]  (Normal) Collected: 05/22/24 0749    Lab Status: Final result Specimen: Blood from Arm, Left Updated: 05/22/24 1103     TSH 3RD GENERATON 0.957 uIU/mL     Urine Microscopic [[id number]]  (Normal) Collected: 05/22/24 0930    Lab Status: Final result Specimen: Urine, Other Updated: 05/22/24 1020     RBC, UA None Seen /hpf      WBC, UA None Seen /hpf      Epithelial Cells None Seen /hpf      Bacteria, UA None Seen /hpf     HS Troponin I 2hr [[id number]]  (Normal) Collected: 05/22/24 0929    Lab Status: Final result Specimen: Blood from Arm, Right Updated: 05/22/24 1015     hs TnI 2hr 4 ng/L      Delta 2hr hsTnI 1 ng/L     UA w Reflex to Microscopic w Reflex to Culture  [[id number]]  (Abnormal) Collected: 05/22/24 0930    Lab Status: Final result Specimen: Urine, Other Updated: 05/22/24 1008     Color, UA Colorless     Clarity, UA Clear     Specific Gravity, UA 1.009     pH, UA 5.0     Leukocytes, UA Elevated glucose may cause decreased leukocyte values. See urine microscopic for [facility] result     Nitrite, UA Negative     Protein, UA Negative mg/dl      Glucose, UA >=1000 (1%) mg/dl      Ketones, UA Negative mg/dl      Urobilinogen, UA <2.0 mg/dl      Bilirubin, UA Negative     Occult Blood, UA Negative    Fingerstick Glucose (POCT) [[id number]]  (Normal) Collected: 05/22/24 0932    Lab Status: Final result Specimen: Blood Updated: 05/22/24 0933     POC Glucose 104 mg/dl     Vitamin B12 [[id number]]  (Normal) Collected: 05/22/24 0749    Lab Status: Final result Specimen: Blood from Arm, Left Updated: 05/22/24 0854     Vitamin B-12 543 pg/mL     Comprehensive metabolic panel [[id number]]  (Abnormal) Collected: 05/22/24 0749    Lab Status: Final result Specimen: Blood from Arm, Left Updated: 05/22/24 0849     Sodium 127 mmol/L      Potassium 4.3 mmol/L      Chloride 99 mmol/L      CO2 17 mmol/L      ANION GAP 11 mmol/L      BUN 23 mg/dL      Creatinine 1.50 mg/dL      Glucose 138 mg/dL      Calcium 8.1 mg/dL      Corrected Calcium 8.7 mg/dL      AST 17 U/L      ALT 14 U/L      Alkaline Phosphatase 28 U/L      Total Protein 5.3 g/dL      Albumin 3.2 g/dL      Total Bilirubin 0.71 mg/dL      eGFR 46 ml/min/1.73sq m     Narrative:      [employer] guidelines for Chronic Kidney Disease (CKD):     Stage 1 with normal or high GFR (GFR > 90 mL/min/1.73 square meters)    Stage 2 Mild CKD (GFR = 60-89 mL/min/1.73 square meters)    Stage 3A Moderate CKD (GFR = 45-59 mL/min/1.73 square meters)    Stage 3B Moderate CKD (GFR = 30-44 mL/min/1.73 square meters)    Stage 4 Severe CKD (GFR = 15-29 mL/min/1.73 square meters)    Stage 5 End Stage CKD (GFR <15 mL/min/1.73 square  meters)  Note: GFR calculation is accurate only with a steady state creatinine    Acetaminophen level-If concentration is detectable, please discuss with medical  on call. [[id number]]  (Abnormal) Collected: 05/22/24 0749    Lab Status: Final result Specimen: Blood from Arm, Left Updated: 05/22/24 0849     Acetaminophen Level <2 ug/mL     Salicylate level [[id number]]  (Normal) Collected: 05/22/24 0749    Lab Status: Final result Specimen: Blood from Arm, Left Updated: 05/22/24 0849     Salicylate Lvl <5 mg/dL     Ethanol [[id number]]  (Abnormal) Collected: 05/22/24 0749    Lab Status: Final result Specimen: Blood from Arm, Left Updated: 05/22/24 0827     Ethanol Lvl 30 mg/dL     HS Troponin 0hr (reflex protocol) [[id number]]  (Normal) Collected: 05/22/24 0749    Lab Status: Final result Specimen: Blood from Arm, Left Updated: 05/22/24 0825     hs TnI 0hr 3 ng/L     Ammonia [[id number]]  (Normal) Collected: 05/22/24 0749    Lab Status: Final result Specimen: Blood from Arm, Left Updated: 05/22/24 0824     Ammonia 24 umol/L     Protime-INR [[id number]]  (Abnormal) Collected: 05/22/24 0749    Lab Status: Final result Specimen: Blood from Arm, Left Updated: 05/22/24 0817     Protime 42.2 seconds      INR 4.57    CBC and differential [[id number]]  (Abnormal) Collected: 05/22/24 0749    Lab Status: Final result Specimen: Blood from Arm, Left Updated: 05/22/24 0801     WBC 9.75 Thousand/uL      RBC 3.60 Million/uL      Hemoglobin 10.6 g/dL      Hematocrit 31.7 %      MCV 88 fL      MCH 29.4 pg      MCHC 33.4 g/dL      RDW 13.2 %      MPV 9.5 fL      Platelets 237 Thousands/uL      nRBC 0 /100 WBCs      Segmented % 76 %      Immature Grans % 1 %      Lymphocytes % 15 %      Monocytes % 8 %      Eosinophils Relative 0 %      Basophils Relative 0 %      Absolute Neutrophils 7.35 Thousands/µL      Absolute Immature Grans 0.10 Thousand/uL      Absolute Lymphocytes 1.46 Thousands/µL      Absolute Monocytes 0.82 Thousand/µL       Eosinophils Absolute 0.00 Thousand/µL      Basophils Absolute 0.02 Thousands/µL                    CT head wo contrast   Final Result by Matteo Sanford MD (05/22 1015)      No evidence of acute intracranial process.      Chronic microangiopathy.      Chronic right PCA territory infarction.                  Workstation performed: [id number]         CTA lower extremity right w wo contrast   Final Result by Anam Leach MD (05/22 0905)      13 cm hematoma extends through the posterolateral proximal right thigh. There are 2 separate foci of active bleeding.      The study was marked in EPIC for immediate notification.            Workstation performed: [id number]         XR chest 2 views   Final Result by Richard Pereyra MD (05/22 4320)      No active pulmonary disease.            Workstation performed: [id number]         IR embolization (specify vessel or site)    (Results Pending)         Procedures  Procedures      ED Course  ED Course as of 05/22/24 2157   Wed May 22, 2024   0904 Sodium(!): 127   0904 POCT INR(!): 4.57  Vitamin k   0904 Hemoglobin(!): 10.6  Dropped 4 from 10d ago   0928 Concern for active extravasation and hematoma, reached out to trauma they will evaluate shortly.                             SBIRT 20yo+      Flowsheet Row Most Recent Value   Initial Alcohol Screen: US AUDIT-C     1. How often do you have a drink containing alcohol? 6 Filed at: 05/22/2024 0719   2. How many drinks containing alcohol do you have on a typical day you are drinking?  4 Filed at: 05/22/2024 0719   3a. Male UNDER 65: How often do you have five or more drinks on one occasion? 5 Filed at: 05/22/2024 0719   3b. FEMALE Any Age, or MALE 65+: How often do you have 4 or more drinks on one occassion? 0 Filed at: 05/22/2024 0719   Audit-C Score 15 Filed at: 05/22/2024 0719   SULEMAN: How many times in the past year have you...    Used an illegal drug or used a prescription medication for non-medical reasons?  Never Filed at: 05/22/2024 0719                  Medical Decision Making  Patient is a 68-year-old male with history of alcohol abuse, stroke, DVT, seizures, hypertension, factor V deficiency, hyperlipidemia, diabetes who presents for altered mental status.    DDx includes but not limited to acute blood loss anemia, hemorrhagic shock, intramuscular hematoma, accidental overdose of warfarin, alcohol intoxication, intracerebral hemorrhage.  Patient arrives with soft blood pressures but overall hemodynamically stable.  His right thigh is concerning for hematoma as a possible source of blood loss contributing to his symptoms.  Will plan to obtain labs, CT head, CT lower extremity.    Patient's INR is 4.57 with a hemoglobin drop from 14-10 over the course of the past 10 days.  CT lower extremity reveals a large right thigh hematoma with 2 points of active extravasation.  Patient was given vitamin K and Kcentra.  I consulted the trauma team who will plan to admit the patient for further management of his injuries.  Otherwise, labs are notable for hyponatremia to 127 likely secondary to chronic alcohol abuse.    Amount and/or Complexity of Data Reviewed  Labs: ordered. Decision-making details documented in ED Course.  Radiology: ordered.    Risk  Prescription drug management.          Disposition  Final diagnoses:   Subcutaneous hematoma   Alcohol abuse   HTN (hypertension)   HLD (hyperlipidemia)   DM (diabetes mellitus) (HCC)   History of DVT (deep vein thrombosis)   History of stroke     Time reflects when diagnosis was documented in both MDM as applicable and the Disposition within this note       Time User Action Codes Description Comment    5/22/2024 10:00 AM Dougie Kelley Add [T14.8XXA] Subcutaneous hematoma     5/22/2024  9:56 PM Jaspal Gillespie Add [F10.10] Alcohol abuse     5/22/2024  9:56 PM Jaspal Gillespie Add [I10] HTN (hypertension)     5/22/2024  9:56 PM Jaspal Gillespie Add [E78.5] HLD (hyperlipidemia)      5/22/2024  9:56 PM Verna Jaspal Add [E11.9] DM (diabetes mellitus) (HCC)     5/22/2024  9:56 PM Jaspal Gillespie Add [Z86.718] History of DVT (deep vein thrombosis)     5/22/2024  9:56 PM Jaspal Gillespie Add [Z86.73] History of stroke           ED Disposition       None          Follow-up Information    None         Current Discharge Medication List        CONTINUE these medications which have NOT CHANGED    Details   aspirin 81 MG tablet Take 81 mg by mouth      atorvastatin (LIPITOR) 10 mg tablet Take 10 mg by mouth daily  Refills: 3      Cholecalciferol (VITAMIN D3) 1000 units CAPS i po daily      cloNIDine (CATAPRES) 0.1 mg tablet Take 0.1 mg by mouth 2 (two) times a day  Refills: 2      Cyanocobalamin (VITAMIN B-12 PO) Take 1,500 Units by mouth 3 (three) times a week       !! fenofibrate (TRICOR) 54 MG tablet Take 160 mg by mouth daily   Refills: 1      !! fenofibrate 160 MG tablet Take 160 mg by mouth daily      FLUoxetine (PROzac) 20 mg capsule TAKE 1 CAPSULE BY ORAL ROUTE EVERY DAY IN THE MORNING  Refills: 2      glipiZIDE (GLUCOTROL XL) 10 mg 24 hr tablet Take 10 mg by mouth daily with breakfast 10 mg in am, 5mg in PM      Jardiance 25 MG TABS Take 25 mg by mouth daily      levETIRAcetam (KEPPRA) 1000 MG tablet By mouth take half tablet in the morning and 1 full tablet in the evening daily  Qty: 135 tablet, Refills: 3    Associated Diagnoses: Epilepsy with seizures of localized onset ([facility])      !! lisinopril (ZESTRIL) 20 mg tablet Take 10 mg by mouth daily  Refills: 1      !! lisinopril (ZESTRIL) 5 mg tablet Take 5 mg by mouth daily      LORazepam (ATIVAN) 0.5 mg tablet Take 0.5 mg by mouth if needed      metFORMIN (GLUCOPHAGE) 500 mg tablet Take 500 mg by mouth 2 (two) times a day        !! metoprolol succinate (TOPROL-XL) 25 mg 24 hr tablet Take 25 mg by mouth daily      !! metoprolol succinate (TOPROL-XL) 50 mg 24 hr tablet Take 50 mg by mouth daily      repaglinide (PRANDIN) 1 mg tablet TAKE 1  TABLET BY MOUTH 3 TIMES EVERY DAY 15 TO 30 MINUTES BEFORE MEALS      sildenafil (VIAGRA) 100 mg tablet As needed for ED.      thiamine (VITAMIN B1) 100 mg tablet i po daily      !! warfarin (COUMADIN) 1 mg tablet take daily per warfarin protocol      !! warfarin (COUMADIN) 4 mg tablet Take 4 mg by mouth 3.5   3 times a week, 4mg 4 x a week      !! warfarin (COUMADIN) 5 mg tablet Take 5 mg by mouth daily Everyday  Refills: 1       !! - Potential duplicate medications found. Please discuss with provider.        No discharge procedures on file.    PDMP Review       None             ED Provider  Attending physically available and evaluated Brian Lal. I managed the patient along with the ED Attending.    Electronically Signed by           Jaspal Gillespie MD  05/22/24 5197

## 2024-05-22 NOTE — BRIEF OP NOTE (RAD/CATH)
INTERVENTIONAL RADIOLOGY PROCEDURE NOTE    Date: 5/22/2024    Procedure:   Procedure Summary       Date:  Room / Location:     Anesthesia Start:  Anesthesia Stop:     Procedure:  Diagnosis:     Scheduled Providers:  Responsible Provider:     Anesthesia Type: Not recorded ASA Status: Not recorded            Preoperative diagnosis:   1. Subcutaneous hematoma         Postoperative diagnosis: Same.    Surgeon: Iglesia Rivera MD     Assistant: None. No qualified resident was available.    Blood loss: Minimal    Specimens: None    Findings: No active bleeding seen in the right thigh.  2 proximal profunda branches supplying the region of a hematoma embolized with Gelfoam.    Pro-glide closure left common femoral artery    Discussed with Dr. Esparza.    Complications: None immediate.    Anesthesia: general

## 2024-05-22 NOTE — ED ATTENDING ATTESTATION
"5/22/2024  I, Olimpia Coe DO, saw and evaluated the patient. I have discussed the patient with the resident/non-physician practitioner and agree with the resident's/non-physician practitioner's findings, Plan of Care, and MDM as documented in the resident's/non-physician practitioner's note, except where noted. All available labs and Radiology studies were reviewed.  I was present for key portions of any procedure(s) performed by the resident/non-physician practitioner and I was immediately available to provide assistance.       At this point I agree with the current assessment done in the Emergency Department.  I have conducted an independent evaluation of this patient a history and physical is as follows:    68-year-old male presents with altered mental status.  Patient states he came in because he fell yesterday while he was cleaning the pool.  He states he has \"brush burn\" on his right lower extremity.  He denies abdominal pain, no headache, states he does not feel dizzy.  Patient does have history of stroke, he is a poor historian.  He denies hitting his head recently, admits that he drinks alcohol about 5 days a week and drinks approximately 5 to 6 cans of beer or cocktail when he drinks.  Admits that he was drinking alcohol last night.  Patient is on Coumadin.  On exam-no acute distress, heart regular, no respiratory distress, abdomen soft and nontender, no obvious sign of head injury, large ecchymotic area that is firm to touch on his right hip/upper lateral thigh with tenderness, superficial abrasion, neurovascular intact, superficial abrasion noted on right lateral leg just distal to the knee.  Plan-concern for traumatic injury in the right lower extremity, will do CT with contrast to rule out active bleeding, check labs including INR, CT head secondary to fall and altered mental status.  Check labs.    ED Course     Reviewed labs.  Patient has drop in hemoglobin from approximately 14 to 10, and " elevated INR.  Will give vitamin K, await CT    Patient with active extravasation CT, trauma consulted, reverse Coumadin, family and patient aware    Critical Care Time  CriticalCare Time    Date/Time: 5/22/2024 10:18 AM    Performed by: Olimpia Coe DO  Authorized by: Olimpia Coe DO    Critical care provider statement:     Critical care time (minutes):  35    Critical care time was exclusive of:  Separately billable procedures and treating other patients and teaching time    Critical care was necessary to treat or prevent imminent or life-threatening deterioration of the following conditions:  Trauma    Critical care was time spent personally by me on the following activities:  Obtaining history from patient or surrogate, examination of patient, evaluation of patient's response to treatment, review of old charts, re-evaluation of patient's condition, ordering and review of radiographic studies and ordering and review of laboratory studies    I assumed direction of critical care for this patient from another provider in my specialty: no

## 2024-05-22 NOTE — DISCHARGE INSTRUCTIONS
Embolization   AMBULATORY CARE:   What you need to know about embolization: Embolization is a procedure to create a clot, or block, in a blood vessel. This stops blood from flowing to the area. The procedure may be used to treat many conditions. It can help stop heavy bleeding (hemorrhage), or prevent an aneurysm from rupturing. An abnormal connection between arteries can be removed. Embolization can stop blood flow to a tumor, such as a uterine fibroid or a cancer tumor. Chemotherapy medicine may be given during an embolization to treat a cancer tumor. This is called chemoembolization.  How to prepare for the procedure: Embolization is sometimes done as an emergency procedure. This means you will not have time to prepare. For an embolization that is not an emergency, the following are general guidelines for how to prepare:  Tell your provider about all your allergies. This includes if you have ever had an allergic reaction to contrast liquid, anesthesia, or antibiotics. You may be told not to eat or drink anything after midnight the night before your procedure. Arrange to have someone drive you home. The person should stay with you to help you and watch for problems that may develop.     Give your provider a list of your medicines. Include all medicines and supplements you take. You may need to stop taking blood thinners or aspirin several days before your procedure. This will help decrease your risk for bleeding. Do not stop taking medicines unless your healthcare provider tells you to stop. Your provider will tell you which medicines to take or not take on the day of your procedure.     You may need blood tests to check how well your blood clots and to check your kidney function. Depending on the reason for this procedure, you may an MRI, ultrasound, x-ray, or CT scan. These pictures will help your healthcare provider examine the area to be worked on.     If you are a woman, tell your provider if you know or  think you might be pregnant. You may not be able to have certain tests because they may harm an unborn baby. Your provider may need to take extra precautions for other tests.     What will happen during the procedure:   You may be given general anesthesia to keep you asleep and pain-free. You may instead be given moderate sedation. This means you will be awake during the procedure, but you should not feel any pain. Your provider will put numbing medicine on your skin where the procedure will be done. A small incision will be made over an artery. A catheter (thin tube) will be guided into the artery. Contrast liquid will be used to help your healthcare provider see your arteries more easily.     Your provider will use a type of x-ray that gives a moving picture of the arteries. This will help him or her move the catheter into the right place. The catheter is moved up until it reaches the correct artery. Your provider will put medicine or a material into the artery to slow or stop blood from flowing. This may be a coil, foam, beads, a plug, or liquid. The liquid may also contain material that is larger than blood cells.      Your provider will remove the catheter. Pressure will be used to stop any bleeding that happens. The incision area does not need to be closed with stitches. It will be small and close on its own. It will be covered with a bandage to keep it from becoming infected.     What to expect after the procedure:   You may have pain for a few days. Depending on the reason you had this procedure, you may also have a headache or cramps. You may have pain, bleeding, or bruising where the catheter went into your leg. All of these symptoms are normal and should get better soon. You may be given pain medicine through your IV or a pump. A pump allows you to control when the pain medicine is given.     You should expect to stay in the hospital at least overnight. If you had this procedure to treat heavy bleeding,  it may take 24 hours to know if the bleeding stopped.     Healthcare providers will help you walk around after your procedure. This will help prevent blood clots. Do not get up until healthcare providers say it is okay. They may want you to lie in one position for a certain amount of time. When they say it is okay to walk, they will help you stand and walk safely.     Risks of embolization: You may bleed more than expected or develop an infection. The area being treated may be damaged during the procedure. Your artery may be damaged from the catheter, or you may develop a blood clot. Your kidneys may be damaged from the contrast liquid. The material being put into the artery may go to the wrong place. This can stop blood flow to healthy tissue. The procedure may not work, or it may not relieve your symptoms.  Call your doctor or specialist if:   You have a fever higher than 100.4°F (38°C).     You have a fever, pain, and nausea that last longer than 3 days.     You suddenly have severe abdominal pain.     You cannot urinate, or you urinate very little.     You have signs of an infection at the catheter site, such as red streaks, pain, or swelling.     You have new or worsening pain.     You have questions or concerns about your condition or care.     Medicines: You may need any of the following:  NSAIDs help decrease swelling and pain or fever. This medicine is available with or without a doctor's order. NSAIDs can cause stomach bleeding or kidney problems in certain people. If you take blood thinner medicine, always ask your healthcare provider if NSAIDs are safe for you. Always read the medicine label and follow directions.     Prescription pain medicine may be given. Ask your healthcare provider how to take this medicine safely. Some prescription pain medicines contain acetaminophen. Do not take other medicines that contain acetaminophen without talking to your healthcare provider. Too much acetaminophen may  cause liver damage. Prescription pain medicine may cause constipation. Ask your healthcare provider how to prevent or treat constipation.      Take your medicine as directed. Contact your healthcare provider if you think your medicine is not helping or if you have side effects. Tell him or her if you are allergic to any medicine. Keep a list of the medicines, vitamins, and herbs you take. Include the amounts, and when and why you take them. Bring the list or the pill bottles to follow-up visits. Carry your medicine list with you in case of an emergency.     Self-care:   Rest as needed. Rest and sleep will help your body heal.     Follow your healthcare provider's instructions for activity. He or she will tell you when it is okay to return to your normal activities and to start driving. He or she may want you to wait 1 to 2 weeks to return to work.     Care for the catheter site as directed. It is okay to shower after the procedure. You will only have a small cut in your skin from where the catheter went into your leg. Check the catheter site for signs of infection, including red streaks, pain, and swelling.     Treat symptoms of postembolization syndrome. This syndrome is common after an embolization procedure. It usually starts within 72 hours of the procedure and may last a few days. The main symptoms are fever, pain, and nausea. You will probably be able to manage your symptoms at home. Acetaminophen or an NSAID, such as ibuprofen, can reduce a fever and pain. You may need to eat lightly to manage nausea. Drink more liquids for the first week after the procedure to prevent dehydration.   WOUND CARE     Remove band aid/ dressing tomorrow. You may shower 24 hours after your procedure. Shower and wash groin area or wrist area gently with soap and water: beginning tomorrow. Rinse and pat Dry. Apply new water seal band aid. Repeat this process for 5 days.  If there is any drainage from the puncture site, you should  put on a clean bandage. No Powders, creams, lotions or antibiotic ointments for 5 days.  No tub baths, hot tubs or swimming for 5 days.      Follow up with your doctor or specialist as directed: You may need to have more tests to check if the procedure worked. Write down your questions so you remember to ask them during your visits.  © Copyright [employer] 2020 Information is for End User's use only and may not be sold, redistributed or otherwise used for commercial purposes. All illustrations and images included in CareNotes® are the copyrighted property of [email or web address]. or [employer]  The above information is an  only. It is not intended as medical advice for individual conditions or treatments. Talk to your doctor, nurse or pharmacist before following any medical regimen to see if it is safe and effective for you.

## 2024-05-22 NOTE — SEDATION DOCUMENTATION
Angiogram with embolization performed by Dr. Rivera.  Anesthesia present for case.  Perclose closure device used in right groin with dry dressing.  After transfer to bed groin dressing intact and no signs of bleeding or hematoma noted.  Patient transported to PACU and report given.  .IR Procedure Bedrest Start Time is 1245.

## 2024-05-22 NOTE — ANESTHESIA PREPROCEDURE EVALUATION
Procedure:  IR EMBOLIZATION (SPECIFY VESSEL OR SITE)    Relevant Problems   /RENAL   (+) Prostate CA (HCC)   (+) Renal cyst      NEURO/PSYCH   (+) 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 localized onset, not intractable, without status epilepticus (HCC)     Hx ICH, factor 5, ETOH use, coumadin    Had beer this am  Denies CP/SOB/GERD    Consent obtained from wife given pt is likely intoxicated  Received Kcentra and vit k in ER for INR 4.57  Physical Exam    Airway    Mallampati score: II         Dental    implants,     Cardiovascular  Rhythm: regular, Rate: normal    Pulmonary  Pulmonary exam normal     Other Findings        Anesthesia Plan  ASA Score- 4 Emergent    Anesthesia Type- general with ASA Monitors.         Additional Monitors: arterial line.    Airway Plan: ETT.           Plan Factors-    Chart reviewed. EKG reviewed.  Existing labs reviewed.                   Induction- intravenous.    Postoperative Plan- Plan for postoperative opioid use. Planned trial extubation    Perioperative Resuscitation Plan - Level 1 - Full Code.       Informed Consent- Anesthetic plan and risks discussed with patient and spouse.  I personally reviewed this patient with the CRNA. Discussed and agreed on the Anesthesia Plan with the CRNA..    Note delayed due to emergent nature of case

## 2024-05-22 NOTE — ANESTHESIA POSTPROCEDURE EVALUATION
Post-Op Assessment Note    CV Status:  Stable  Pain Score: 0    Pain management: adequate       Mental Status:  Alert   Hydration Status:  Stable   PONV Controlled:  None   Airway Patency:  Patent  Two or more mitigation strategies used for obstructive sleep apnea   Post Op Vitals Reviewed: Yes    No anethesia notable event occurred.    Staff: LATASHA               /57 (05/22/24 1244)    Temp 97.6 °F (36.4 °C) (05/22/24 1244)    Pulse 75 (05/22/24 1244)   Resp 21 (05/22/24 1244)    SpO2 97 % (05/22/24 1244)

## 2024-05-23 ENCOUNTER — APPOINTMENT (INPATIENT)
Dept: NON INVASIVE DIAGNOSTICS | Facility: HOSPITAL | Age: 69
DRG: 981 | End: 2024-05-23
Payer: MEDICARE

## 2024-05-23 PROBLEM — S70.11XA HEMATOMA OF RIGHT THIGH: Status: ACTIVE | Noted: 2024-05-23

## 2024-05-23 LAB
ABO GROUP BLD BPU: NORMAL
ABO GROUP BLD BPU: NORMAL
ANION GAP SERPL CALCULATED.3IONS-SCNC: 7 MMOL/L (ref 4–13)
ATRIAL RATE: 76 BPM
BPU ID: NORMAL
BPU ID: NORMAL
BUN SERPL-MCNC: 13 MG/DL (ref 5–25)
CALCIUM SERPL-MCNC: 7.5 MG/DL (ref 8.4–10.2)
CHLORIDE SERPL-SCNC: 107 MMOL/L (ref 96–108)
CO2 SERPL-SCNC: 25 MMOL/L (ref 21–32)
CREAT SERPL-MCNC: 1.11 MG/DL (ref 0.6–1.3)
CROSSMATCH: NORMAL
CROSSMATCH: NORMAL
GFR SERPL CREATININE-BSD FRML MDRD: 67 ML/MIN/1.73SQ M
GLUCOSE SERPL-MCNC: 103 MG/DL (ref 65–140)
GLUCOSE SERPL-MCNC: 105 MG/DL (ref 65–140)
GLUCOSE SERPL-MCNC: 193 MG/DL (ref 65–140)
GLUCOSE SERPL-MCNC: 202 MG/DL (ref 65–140)
GLUCOSE SERPL-MCNC: 66 MG/DL (ref 65–140)
GLUCOSE SERPL-MCNC: 67 MG/DL (ref 65–140)
HCT VFR BLD AUTO: 28.2 % (ref 36.5–49.3)
HCT VFR BLD AUTO: 29.1 % (ref 36.5–49.3)
HGB BLD-MCNC: 9.5 G/DL (ref 12–17)
HGB BLD-MCNC: 9.6 G/DL (ref 12–17)
MAGNESIUM SERPL-MCNC: 2.1 MG/DL (ref 1.9–2.7)
P AXIS: 47 DEGREES
PHOSPHATE SERPL-MCNC: 2.2 MG/DL (ref 2.3–4.1)
POTASSIUM SERPL-SCNC: 4.1 MMOL/L (ref 3.5–5.3)
PR INTERVAL: 182 MS
QRS AXIS: -50 DEGREES
QRSD INTERVAL: 104 MS
QT INTERVAL: 392 MS
QTC INTERVAL: 441 MS
SODIUM SERPL-SCNC: 139 MMOL/L (ref 135–147)
T WAVE AXIS: 52 DEGREES
UNIT DISPENSE STATUS: NORMAL
UNIT DISPENSE STATUS: NORMAL
UNIT PRODUCT CODE: NORMAL
UNIT PRODUCT CODE: NORMAL
UNIT PRODUCT VOLUME: 350 ML
UNIT PRODUCT VOLUME: 350 ML
UNIT RH: NORMAL
UNIT RH: NORMAL
VENTRICULAR RATE: 76 BPM

## 2024-05-23 PROCEDURE — 85018 HEMOGLOBIN: CPT

## 2024-05-23 PROCEDURE — 93010 ELECTROCARDIOGRAM REPORT: CPT | Performed by: INTERNAL MEDICINE

## 2024-05-23 PROCEDURE — 82948 REAGENT STRIP/BLOOD GLUCOSE: CPT

## 2024-05-23 PROCEDURE — 97163 PT EVAL HIGH COMPLEX 45 MIN: CPT

## 2024-05-23 PROCEDURE — 80048 BASIC METABOLIC PNL TOTAL CA: CPT | Performed by: REGISTERED NURSE

## 2024-05-23 PROCEDURE — 97167 OT EVAL HIGH COMPLEX 60 MIN: CPT

## 2024-05-23 PROCEDURE — 85014 HEMATOCRIT: CPT

## 2024-05-23 PROCEDURE — 84100 ASSAY OF PHOSPHORUS: CPT | Performed by: REGISTERED NURSE

## 2024-05-23 PROCEDURE — 83735 ASSAY OF MAGNESIUM: CPT | Performed by: REGISTERED NURSE

## 2024-05-23 PROCEDURE — NC001 PR NO CHARGE: Performed by: STUDENT IN AN ORGANIZED HEALTH CARE EDUCATION/TRAINING PROGRAM

## 2024-05-23 PROCEDURE — 99233 SBSQ HOSP IP/OBS HIGH 50: CPT | Performed by: STUDENT IN AN ORGANIZED HEALTH CARE EDUCATION/TRAINING PROGRAM

## 2024-05-23 RX ORDER — LORAZEPAM 0.5 MG/1
0.5 TABLET ORAL DAILY PRN
Status: DISCONTINUED | OUTPATIENT
Start: 2024-05-23 | End: 2024-05-24

## 2024-05-23 RX ORDER — POLYETHYLENE GLYCOL 3350 17 G/17G
17 POWDER, FOR SOLUTION ORAL DAILY
Status: DISCONTINUED | OUTPATIENT
Start: 2024-05-23 | End: 2024-05-28 | Stop reason: HOSPADM

## 2024-05-23 RX ORDER — ENOXAPARIN SODIUM 100 MG/ML
30 INJECTION SUBCUTANEOUS EVERY 12 HOURS SCHEDULED
Status: DISCONTINUED | OUTPATIENT
Start: 2024-05-23 | End: 2024-05-23

## 2024-05-23 RX ORDER — INSULIN LISPRO 100 [IU]/ML
1-5 INJECTION, SOLUTION INTRAVENOUS; SUBCUTANEOUS
Status: DISCONTINUED | OUTPATIENT
Start: 2024-05-23 | End: 2024-05-28 | Stop reason: HOSPADM

## 2024-05-23 RX ORDER — ENOXAPARIN SODIUM 100 MG/ML
30 INJECTION SUBCUTANEOUS EVERY 12 HOURS SCHEDULED
Status: DISCONTINUED | OUTPATIENT
Start: 2024-05-23 | End: 2024-05-25

## 2024-05-23 RX ORDER — INSULIN LISPRO 100 [IU]/ML
1-6 INJECTION, SOLUTION INTRAVENOUS; SUBCUTANEOUS
Status: DISCONTINUED | OUTPATIENT
Start: 2024-05-23 | End: 2024-05-28 | Stop reason: HOSPADM

## 2024-05-23 RX ADMIN — LEVETIRACETAM 1000 MG: 500 TABLET, FILM COATED ORAL at 17:22

## 2024-05-23 RX ADMIN — LEVETIRACETAM 1000 MG: 500 TABLET, FILM COATED ORAL at 08:47

## 2024-05-23 RX ADMIN — FENOFIBRATE 145 MG: 145 TABLET ORAL at 08:47

## 2024-05-23 RX ADMIN — THIAMINE HCL TAB 100 MG 100 MG: 100 TAB at 08:47

## 2024-05-23 RX ADMIN — CHLORHEXIDINE GLUCONATE 15 ML: 1.2 SOLUTION ORAL at 08:46

## 2024-05-23 RX ADMIN — ENOXAPARIN SODIUM 30 MG: 30 INJECTION SUBCUTANEOUS at 21:33

## 2024-05-23 RX ADMIN — ATORVASTATIN CALCIUM 10 MG: 10 TABLET, FILM COATED ORAL at 16:06

## 2024-05-23 RX ADMIN — Medication 2.5 MG: at 22:41

## 2024-05-23 RX ADMIN — CHLORHEXIDINE GLUCONATE 15 ML: 1.2 SOLUTION ORAL at 21:33

## 2024-05-23 RX ADMIN — SENNOSIDES AND DOCUSATE SODIUM 1 TABLET: 50; 8.6 TABLET ORAL at 21:33

## 2024-05-23 RX ADMIN — INSULIN LISPRO 2 UNITS: 100 INJECTION, SOLUTION INTRAVENOUS; SUBCUTANEOUS at 11:24

## 2024-05-23 RX ADMIN — SODIUM CHLORIDE, SODIUM GLUCONATE, SODIUM ACETATE, POTASSIUM CHLORIDE, MAGNESIUM CHLORIDE, SODIUM PHOSPHATE, DIBASIC, AND POTASSIUM PHOSPHATE 100 ML/HR: .53; .5; .37; .037; .03; .012; .00082 INJECTION, SOLUTION INTRAVENOUS at 08:45

## 2024-05-23 RX ADMIN — Medication 2.5 MG: at 16:06

## 2024-05-23 RX ADMIN — Medication 1 TABLET: at 08:47

## 2024-05-23 RX ADMIN — FLUOXETINE 20 MG: 20 CAPSULE ORAL at 08:47

## 2024-05-23 RX ADMIN — LORAZEPAM 0.5 MG: 0.5 TABLET ORAL at 21:33

## 2024-05-23 RX ADMIN — INSULIN LISPRO 1 UNITS: 100 INJECTION, SOLUTION INTRAVENOUS; SUBCUTANEOUS at 21:33

## 2024-05-23 RX ADMIN — ACETAMINOPHEN 650 MG: 325 TABLET, FILM COATED ORAL at 13:39

## 2024-05-23 RX ADMIN — ENOXAPARIN SODIUM 30 MG: 30 INJECTION SUBCUTANEOUS at 11:19

## 2024-05-23 RX ADMIN — FOLIC ACID 1 MG: 1 TABLET ORAL at 08:46

## 2024-05-23 NOTE — PROGRESS NOTES
Progress Note -Interventional Radiology   Brian Lal 68 y.o. 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  Patient with drop in hemoglobin 4g and CTA demonstrated active extravasation right thigh.  Patient was reversed with Kcentra and IR consulted.  Patient s/p IR angiogram yesterday with no active bleeding seen in the right thigh, but 2 proximal profunda branches supplying the region of the hematoma embolized with Gelfoam.    -Patient reports feeling well today.  He notes his right thigh and leg are still painful, but slightly improved from yesterday.  He believes the swelling is about the same.  He denies any new numbness, tingling, weakness of the legs, pain at the left groin access site, dizziness or lightheadedness.  -Left groin access site soft, nontender with dressing in place, CDI.  No surrounding swelling, erythema, ecchymosis or expanding hematoma noted.  -Patient has been afebrile, had 1 episode of hypotension this afternoon, that corrected without intervention, otherwise VSS.  Hemoglobin trend since yesterday afternoon 10.6->10.0->9.5->9.6, currently stable.  -Continue to monitor left groin site for any increased pain, swelling or expanding hematoma  -Continue to monitor right leg hematoma  -Trend hemoglobin, transfuse as per primary team  -Remainder of care per primary team  -Please reach out to IR with any questions/concerns      Patient Active Problem List   Diagnosis    Elevated PSA    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 localized onset, not intractable, without status epilepticus (HCC)    History of cerebral hemorrhage    Prostate CA (HCC)    Sleep disturbance    History of prostate  cancer    Renal cyst    Hematoma of right thigh          Subjective: Brian Lal is a 68 y.o. male who presented with altered mental status, fall and right thigh hematoma with active extravasation. S/p IR angiogram yesterday with no active bleeding seen in the right thigh, but 2 proximal profunda branches supplying the region of the hematoma embolized with Gelfoam. Patient reports feeling well today.  He notes his right thigh and leg are still painful, but slightly improved from yesterday.  He believes the swelling is about the same.  He denies any new numbness, tingling, weakness of the legs, pain at the left groin access site, dizziness or lightheadedness, chest pain, SOB, abdominal pain.    Objective:    Vitals:  /67   Pulse 80   Temp 98.4 °F (36.9 °C) (Oral)   Resp 20   SpO2 96%   There is no height or weight on file to calculate BMI.  Weight (last 2 days)       None            I/Os:    Intake/Output Summary (Last 24 hours) at 5/23/2024 1626  Last data filed at 5/23/2024 1501  Gross per 24 hour   Intake 3201.66 ml   Output 4100 ml   Net -898.34 ml       Invasive Devices       Peripheral Intravenous Line  Duration             Peripheral IV 05/22/24 Distal;Left;Ventral (anterior) Forearm 1 day    Peripheral IV 05/22/24 Right;Upper Arm <1 day                    Physical Exam:  /67   Pulse 80   Temp 98.4 °F (36.9 °C) (Oral)   Resp 20   SpO2 96%   General appearance: alert and oriented, in no acute distress  Head: Normocephalic, without obvious abnormality  Lungs:  Equal chest rise bilaterally, no work of breathing  Heart: regular rate and rhythm  Abdomen:  Soft, nontender  RLE swelling and tenderness with hematoma noted from right gluteal to majority of right thigh.  Hematoma appears within markings.  Left groin access site soft, nontender with dressing in place, CDI.  No surrounding swelling, erythema, ecchymosis or expanding hematoma noted.  Neurovascularly intact, 5/5 strength with  "flexion/extension of the feet b/l.            Lab Results and Cultures:   CBC with diff:   Lab Results   Component Value Date    WBC 6.72 05/22/2024    HGB 9.6 (L) 05/23/2024    HCT 29.1 (L) 05/23/2024    MCV 89 05/22/2024     05/22/2024    RBC 3.54 (L) 05/22/2024    MCH 29.9 05/22/2024    MCHC 33.8 05/22/2024    RDW 13.2 05/22/2024    MPV 9.3 05/22/2024    NRBC 0 05/22/2024      BMP/CMP:  Lab Results   Component Value Date    K 4.1 05/23/2024    K 4.2 04/15/2024     05/23/2024     04/15/2024    CO2 25 05/23/2024    CO2 19 (L) 05/22/2024    CO2 25 04/15/2024    BUN 13 05/23/2024    BUN 25 04/15/2024    CREATININE 1.11 05/23/2024    CREATININE 1.70 (H) 04/15/2024    GLUCOSE 92 05/22/2024    CALCIUM 7.5 (L) 05/23/2024    CALCIUM 9.9 04/15/2024    AST 14 05/22/2024    AST 25 04/15/2024    ALT 13 05/22/2024    ALT 28 04/15/2024    ALKPHOS 24 (L) 05/22/2024    ALKPHOS 36 04/15/2024    EGFR 67 05/23/2024    EGFR 43 (L) 04/15/2024    EGFR 44 (L) 10/13/2020   ,     Coags:   Lab Results   Component Value Date    PT 38.8 (H) 05/20/2024    INR 2.15 (H) 05/22/2024    INR 4.0 05/20/2024   ,   Results from last 7 days   Lab Units 05/22/24  1515 05/22/24  0749 05/20/24  1255   PROTIME sec  --   --  38.8*   INR  2.15* 4.57* 4.0        Lipid Panel: No results found for: \"CHOL\"  No results found for: \"HDL\"  No results found for: \"HDL\"  No results found for: \"LDLCALC\"  No results found for: \"TRIG\"    HgbA1c: No results found for: \"HGBA1C\"    Blood Culture: No results found for: \"BLOODCX\",   Urinalysis:   Lab Results   Component Value Date    COLORU Colorless 05/22/2024    CLARITYU Clear 05/22/2024    SPECGRAV 1.009 05/22/2024    PHUR 5.0 05/22/2024    LEUKOCYTESUR (A) 05/22/2024     Elevated glucose may cause decreased leukocyte values. See urine microscopic for UWBC result    NITRITE Negative 05/22/2024    GLUCOSEU >=1000 (1%) (A) 05/22/2024    KETONESU Negative 05/22/2024    BILIRUBINUR Negative 05/22/2024    " "BLOODU Negative 05/22/2024   ,   Urine Culture: No results found for: \"URINECX\",   Wound Culure:  No results found for: \"WOUNDCULT\"    VTE Pharmacologic Prophylaxis: Reason for no pharmacologic prophylaxis right thigh hematoma      Thank you for allowing me to participate in the care of Brian Lal. Please don't hesitate to call, text, email, or [employer] with any questions.     This text is generated with voice recognition software. There may be translation, syntax,  or grammatical errors. If you have any questions, please contact the dictating provider.    "

## 2024-05-23 NOTE — PROGRESS NOTES
[facility]  Progress Note: Critical Care  Name: Brian Lal 68 y.o. male I MRN: [id number]  Unit/Bed#: ICU 07 I Date of Admission: 5/22/2024   Date of Service: 5/23/2024 I Hospital Day: 1    Assessment & Plan   Neuro:   Diagnosis: History of intracranial hemorrhage, subdural hematoma, seizures  Plan: Continue twice daily Keppra 1g  CAM-ICU  Delirium precautions  Regulate sleep-wake cycle  Diagnosis: Alcohol abuse  Plan: Thiamine, folate, multivitamin  Given phenobarbital yesterday  Continue CIWA protocol  Diagnosis: Depression  Plan: Continue home dose Prozac  Diagnosis: Acute pain  Plan: As needed Tylenol, oxycodone, Dilaudid      CV:   Diagnosis: Hypertension, hyperlipidemia  Plan: Continue home dose Tricor, statin  Hold home dose antihypertensives  Follow-up echo    Pulm:  Diagnosis: History of DVT/PE on Coumadin due to factor V Leiden  Plan: Hold home dose Coumadin in setting of gluteal hematoma    GI:   Diagnosis: Bowel regimen  Plan: Continue Senokot    :   Diagnosis: CKD  Plan: Baseline creatinine 1.6-1.7  Strict I/O's  Trend BUN and creatinine    F/E/N:   Isolyte 100/hr   Replete electrolytes as needed  N.p.o.    Heme/Onc:   Diagnosis: Acute blood loss anemia  Plan: Traumatic proximal right thigh hematoma with active extravasation  5/22 IR-no active bleeding seen in right thigh, 2 proximal profunda branches supplying the region embolized with Gelfoam  Every 6 hour hemoglobin checks  Hold DVT prophylaxis at this time    Endo:   Diagnosis: DM2  Plan: SSI    ID:   No active issues    MSK/Skin:   Diagnosis: Gluteal hematoma, ambulatory dysfunction  Plan: Hematoma outlined  Maintain abdominal binder and pressure over site  PT/OT    Disposition: Critical care    ICU Core Measures     A: Assess, Prevent, and Manage Pain Has pain been assessed? Yes  Need for changes to pain regimen? No   B: Both SAT/SAT  N/A   C: Choice of Sedation RASS Goal: 0 Alert and Calm  Need for  changes to sedation or analgesia regimen? No   D: Delirium CAM-ICU: Negative   E: Early Mobility  Plan for early mobility? Yes   F: Family Engagement Plan for family engagement today? Yes       Review of Invasive Devices:            Prophylaxis:  VTE VTE covered by:    None       Stress Ulcer  not ordered        Significant 24hr Events     24hr events: Given phenobarb yesterday for elevated CIWA score     Subjective     Review of Systems   Musculoskeletal:         Right leg pain    All other systems reviewed and are negative.       Objective                            Vitals I/O      Most Recent Min/Max in 24hrs   Temp 98.7 °F (37.1 °C) Temp  Min: 97.5 °F (36.4 °C)  Max: 98.7 °F (37.1 °C)   Pulse 64 Pulse  Min: 60  Max: 86   Resp 15 Resp  Min: 13  Max: 31   BP 95/58 BP  Min: 90/55  Max: 139/75   O2 Sat 95 % SpO2  Min: 93 %  Max: 98 %      Intake/Output Summary (Last 24 hours) at 5/23/2024 0419  Last data filed at 5/23/2024 0227  Gross per 24 hour   Intake 4598.33 ml   Output 4300 ml   Net 298.33 ml       Diet NPO    Invasive Monitoring           Physical Exam   Physical Exam  Vitals and nursing note reviewed.   Eyes:      Pupils: Pupils are equal, round, and reactive to light.   Skin:     General: Skin is warm.      Capillary Refill: Capillary refill takes less than 2 seconds.   HENT:      Head: Normocephalic.      Right Ear: Hearing normal.      Left Ear: Hearing normal.      Mouth/Throat:      Mouth: Mucous membranes are moist.   Cardiovascular:      Rate and Rhythm: Normal rate.      Pulses: Normal pulses.   Musculoskeletal:         General: Swelling (RLE) and tenderness (RLE) present. Normal range of motion.      Comments: Right gluteal hematoma, abdominal binder in place, tenderness on palpation   Abdominal:      Palpations: Abdomen is soft.   Pulmonary:      Effort: Pulmonary effort is normal.      Breath sounds: Normal breath sounds.   Neurological:      General: No focal deficit present.      Mental Status:  He is alert and oriented to person, place and time. Mental status is at baseline.      Motor: gross motor function is at baseline for patient. Motor deficit (RLE due to pain).            Diagnostic Studies      EKG:   Imaging:  I have personally reviewed pertinent reports.       Medications:  Scheduled PRN   atorvastatin, 10 mg, Daily With Dinner  chlorhexidine, 15 mL, Q12H TONY  fenofibrate, 145 mg, Daily  FLUoxetine, 20 mg, Daily  folic acid, 1 mg, Daily  insulin lispro, 1-6 Units, Q6H TONY  levETIRAcetam, 1,000 mg, BID  multivitamin-minerals, 1 tablet, Daily  senna-docusate sodium, 1 tablet, HS  thiamine, 100 mg, Daily      acetaminophen, 650 mg, Q6H PRN  HYDROmorphone, 0.2 mg, Q2H PRN  naloxone, 0.04 mg, Q1MIN PRN  ondansetron, 4 mg, Q4H PRN  oxyCODONE, 2.5 mg, Q4H PRN   Or  oxyCODONE, 5 mg, Q4H PRN       Continuous    multi-electrolyte, 100 mL/hr, Last Rate: 100 mL/hr (05/22/24 1601)         Labs:    CBC    Recent Labs     05/22/24  0749 05/22/24  1137 05/22/24  1515 05/22/24 2018 05/23/24  0305   WBC 9.75  --  6.72  --   --    HGB 10.6*   < > 10.6* 10.0* 9.5*   HCT 31.7*   < > 31.4* 29.8* 28.2*     --  199  --   --     < > = values in this interval not displayed.     BMP    Recent Labs     05/22/24  0749 05/22/24 1137 05/22/24  1515   SODIUM 127*  --  135   K 4.3  --  4.2   CL 99  --  107   CO2 17* 19* 21   AGAP 11  --  7   BUN 23  --  18   CREATININE 1.50*  --  1.19   CALCIUM 8.1*  --  8.3*       Coags    Recent Labs     05/22/24  0749 05/22/24  1515   INR 4.57* 2.15*        Additional Electrolytes  Recent Labs     05/22/24  1137 05/22/24  1515   MG  --  1.8*   PHOS  --  3.5   CAIONIZED 1.23  --           Blood Gas    Recent Labs     05/22/24  1515   PHART 7.407   LTP6OIF 33.3*   PO2ART 108.6   MHW2CVB 20.5*   BEART -3.5   SOURCE Line, Arterial     Recent Labs     05/22/24  1515   SOURCE Line, Arterial    LFTs  Recent Labs     05/22/24  0749 05/22/24  1515   ALT 14 13   AST 17 14   ALKPHOS 28* 24*    ALB 3.2* 3.4*   TBILI 0.71 1.53*       Infectious  No recent results  Glucose  Recent Labs     05/22/24  0749 05/22/24  1515   GLUC 138 73               SALVATORE Jackson

## 2024-05-23 NOTE — CASE MANAGEMENT
Case Management Discharge Planning Note    Patient name Brian Lal  Location ICU 07/ICU 07 MRN [id number]  : 1955 Date 2024       Current Admission Date: 2024  Current Admission Diagnosis:Hematoma of right thigh   Patient Active Problem List    Diagnosis Date Noted Date Diagnosed    Hematoma of right thigh 2024     History of prostate cancer 2021     Renal cyst 2021     Sleep disturbance 2020     Prostate CA (HCC) 2018     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 localized onset, not intractable, without status epilepticus (HCC) 2018     History of cerebral hemorrhage 2018     Elevated PSA 2018       LOS (days): 1  Geometric Mean LOS (GMLOS) (days): 2.5  Days to GMLOS:1.3     OBJECTIVE:  Risk of Unplanned Readmission Score: 21.42         Current admission status: Inpatient   Preferred Pharmacy:   [facility]/pharmacy #[id number] - [place] - [address]  [address]  [facility] [address]  Phone: [phone] Fax: [phone]    [facility] [address] [facility] [place] - [address]  [address]  [facility] [address]  Phone: [phone] Fax: [phone]    Primary Care Provider: Douglas Charles Shoenberger, MD    Primary Insurance: MEDICARE  Secondary Insurance: AARP    DISCHARGE DETAILS:    Discharge planning discussed with:: pt  Freedom of Choice: Yes     CM contacted family/caregiver?: Yes  Were Treatment Team discharge recommendations reviewed with patient/caregiver?: Yes     Were patient/caregiver advised of the risks associated with not following Treatment Team discharge recommendations?: Yes    Contacts  Patient Contacts: Neeta Hearn (Daughter) [phone]  Relationship to Patient:: Family  Contact Method: Phone  Phone Number: [phone]  Reason/Outcome: Emergency Contact, Continuity of Care, Discharge Planning    Requested Home  Health Care         Is the patient interested in HHC at discharge?: Yes  Home Health Discipline requested:: Physical Therapy, Nursing, Occupational Therapy  [facility] Health Agency Name:: [facility]  Home Health Follow-Up Provider:: PCP  Home Health Services Needed:: Evaluate Functional Status and Safety, Gait/ADL Training, Strengthening/Theraputic Exercises to Improve Function  Homebound Criteria Met:: Requires the Assistance of Another Person for Safe Ambulation or to Leave the Home, Uses an Assist Device (i.e. cane, walker, etc)  Supporting Clincal Findings:: Limited Endurance, Fatigues Easliy in Short Distances      Treatment Team Recommendation: Home with Home Health Care  Discharge Destination Plan:: Home with Home Health Care      Pt was evaluated by OT/PT and recommended for Level III  CM [facility] They can accept for home pt/ot/sn    CM reviewed d/c planning process including the following: identifying help at home, patient preference for d/c planning needs, Discharge Lounge, [facility] Meds to Bed program, availability of treatment team to discuss questions or concerns patient and/or family may have regarding understanding medications and recognizing signs and symptoms once discharged.  CM also encouraged patient to follow up with all recommended appointments after discharge. Patient advised of importance for patient and family to participate in managing patient’s medical well being.

## 2024-05-23 NOTE — OCCUPATIONAL THERAPY NOTE
Occupational Therapy Evaluation     Patient Name: Brian Lal  Today's Date: 5/23/2024  Problem List  Active Problems:  There are no active Hospital Problems.    Past Medical History  Past Medical History:   Diagnosis Date    Diabetes (HCC)     Diabetes mellitus (HCC)     DVT (deep venous thrombosis) (HCC) 2016    Dyslipidemia     Factor V deficiency (HCC)     Hypertension     Lobar cerebral hemorrhage (HCC)     Multiple pulmonary emboli (HCC)     Seizures (HCC) 07/2017    Stroke (HCC) 12/14/2016     Past Surgical History  Past Surgical History:   Procedure Laterality Date    IVC FILTER INSERTION      IVC umbrella    PROSTATE BIOPSY Bilateral 09/06/2018    Dr. Riley            05/23/24 1115   OT Last Visit   OT Visit Date 05/23/24   Note Type   Note type Evaluation   Pain Assessment   Pain Assessment Tool 0-10   Pain Score 6   Pain Location/Orientation Orientation: Right;Location: Hip;Location: Leg   Hospital Pain Intervention(s) Repositioned;Ambulation/increased activity;Emotional support;Relaxation technique   Restrictions/Precautions   Weight Bearing Precautions Per Order No   Other Precautions Chair Alarm;Bed Alarm;Multiple lines;Fall Risk   Home Living   Type of Home House   Home Layout Two level;1/2 bath on main level;Able to live on main level with bedroom/bathroom;Stairs to enter with rails   Prior Function   Level of [place] Independent with ADLs;Independent with functional mobility;Needs assistance with IADLS   Lives With Spouse   Receives Help From Family   IADLs Family/Friend/Other provides transportation;Independent with meal prep;Independent with medication management   Falls in the last 6 months 1 to 4  (2)   Vocational Retired   Lifestyle   Autonomy I adls and mobility - shares homemaking with spouse - reports he does not drive 2* visual deficits from prior CVA   Reciprocal Relationships supportive family - wife is able to assist prn   Service to Others retired   Intrinsic Gratification  mostly sedentary   Subjective   Subjective offers no c/o   ADL   Eating Assistance 7  Independent   Grooming Assistance 5  Supervision/Setup   UB Bathing Assistance 4  Minimal Assistance   LB Bathing Assistance 4  Minimal Assistance   UB Dressing Assistance 4  Minimal Assistance   LB Dressing Assistance 4  Minimal Assistance   Toileting Assistance  4  Minimal Assistance   Bed Mobility   Supine to Sit 4  Minimal assistance   Transfers   Sit to Stand 4  Minimal assistance   Stand to Sit 4  Minimal assistance   Functional Mobility   Functional Mobility 4  Minimal assistance   Additional Comments cues to negotiate and avoid obstacles on L side ( has peripheral vision loss on L side from prior CVA)   Additional items Rolling walker   Balance   Static Sitting Fair +   Dynamic Sitting Fair   Static Standing Fair -   Dynamic Standing Poor +   Ambulatory Poor +   Activity Tolerance   Activity Tolerance Patient limited by fatigue;Patient limited by pain;Treatment limited secondary to medical complications (Comment)   Medical Staff Made Aware PT present for co-eval 2* medical complexity, comorbidities and limited overall tolerance to activities   RUE Assessment   RUE Assessment WFL   LUE Assessment   LUE Assessment WFL  (mild residual weakness from prior CVA)   Vision - Complex Assessment   Additional Comments pt able to track in all planes however noted difficulty with inward movement of L eye during convergence - limited peripheral vision on L side c/w premorbid limitations   Cognition   Arousal/Participation Arousable;Cooperative   Attention Attends with cues to redirect   Orientation Level Oriented to person;Oriented to place;Oriented to time;Oriented to situation   Memory Decreased short term memory;Decreased recall of recent events;Decreased recall of precautions   Following Commands Follows one step commands with increased time or repetition   Comments wife reports recent difficulty with not taking meds appropriately  and concern for increaed difficulty with vision   Assessment   Limitation Decreased ADL status;Decreased Safe judgement during ADL;Decreased cognition;Decreased endurance;Decreased high-level ADLs;Decreased self-care trans   Prognosis Good   Assessment Pt is a 68 y.o. male who was admitted to [facility] on 5/22/2024 with change of MS and 2 recent falls - dx with R thigh hematoma s/p embolization . Patient   has a past medical history of Diabetes (HCC), Diabetes mellitus (HCC), DVT (deep venous thrombosis) (HCC), Dyslipidemia, Factor V deficiency (HCC), Hypertension, Lobar cerebral hemorrhage (HCC), Multiple pulmonary emboli (HCC), Seizures (HCC), and Stroke (HCC).Wife reports daily ETOH consumption  At baseline pt was completing adls and mobility independently - I iadls - shares homemaking with spouse. Pt lives with wife in 2 story home with FFSU available PRN. Currently pt requires min assist for overall ADLS and min assist for functional mobility/transfers. Pt currently presents with impairments in the following categories -steps to enter environment, difficulty performing ADLS, difficulty performing IADLS , limited insight into deficits, health management , and environment endurance, standing balance/tolerance, memory, insight, safety , attention , sequencing , and visual perceptual skills . These impairments, as well as pt's fatigue, SOB, pain, impulsivity, (L) visual deficits , risk for falls, and home environment  limit pt's ability to safely engage in all baseline areas of occupation, includingbathing, dressing, toileting, functional mobility/transfers, community mobility, laundry , house maintenance, medication management, meal prep, cleaning, social participation , and leisure activities  From OT standpoint, recommend Level III resources upon D/C. OT will continue to follow to address the below stated goals.   Goals   Patient Goals go home   LTG Time Frame 10-14   Long Term Goal #1 1) Mod I UB/LB  adls after setup with use of LHAE PRN  2)  Mod I toileting and clothing management  3) Mod I bed mobility  4) Mod I functional mob/transfers to and from all surfaces with fair+ to good balance/safety   5) Increase activity tolerance to 30-35min for participation in adls and enjoyable activities  6) Assess DME needs   7) Demonstrate good carryover with safe use of AD during functional tasks   8) Assess DME needs   9) Ongoing functional cognitive and  assessment to assist with safe d/c recommendations   Plan   Treatment Interventions ADL retraining;Functional transfer training;Endurance training;Cognitive reorientation;Patient/family training;Equipment evaluation/education;Compensatory technique education;Activityengagement   Goal Expiration Date 06/06/24   OT Frequency 2-3x/wk   Discharge Recommendation   Rehab Resource Intensity Level, OT III (Minimum Resource Intensity)   AM-PAC Daily Activity Inpatient   Lower Body Dressing 3   Bathing 3   Toileting 3   Upper Body Dressing 3   Grooming 4   Eating 4   Daily Activity Raw Score 20   Daily Activity Standardized Score (Calc for Raw Score >=11) 42.03   AM-PAC Applied Cognition Inpatient   Following a Speech/Presentation 3   Understanding Ordinary Conversation 4   Taking Medications 3   Remembering Where Things Are Placed or Put Away 3   Remembering List of 4-5 Errands 3   Taking Care of Complicated Tasks 3   Applied Cognition Raw Score 19   Applied Cognition Standardized Score 39.77   End of Consult   Education Provided Yes;Family or social support of family present for education by provider   Patient Position at End of Consult Bedside chair;Bed/Chair alarm activated;All needs within reach   Nurse Communication Nurse aware of consult       The patient's raw score on the AM-PAC Daily Activity Inpatient Short Form is 20. A raw score of greater than or equal to 19 suggests the patient may benefit from discharge to home. Please refer to the recommendation of the  Occupational Therapist for safe discharge planning.        Maggie Lassiter, OTR/L

## 2024-05-23 NOTE — PLAN OF CARE
Problem: Nutrition/Hydration-ADULT  Goal: Nutrient/Hydration intake appropriate for improving, restoring or maintaining nutritional needs  Description: Monitor and assess patient's nutrition/hydration status for malnutrition. Collaborate with interdisciplinary team and initiate plan and interventions as ordered.  Monitor patient's weight and dietary intake as ordered or per policy. Utilize nutrition screening tool and intervene as necessary. Determine patient's food preferences and provide high-protein, high-caloric foods as appropriate.     INTERVENTIONS:  - Monitor oral intake, urinary output, labs, and treatment plans  - Assess nutrition and hydration status and recommend course of action  - Evaluate amount of meals eaten  - Assist patient with eating if necessary   - Allow adequate time for meals  - Recommend/ encourage appropriate diets, oral nutritional supplements, and vitamin/mineral supplements  - Order, calculate, and assess calorie counts as needed  - Recommend, monitor, and adjust tube feedings and TPN/PPN based on assessed needs  - Assess need for intravenous fluids  - Provide specific nutrition/hydration education as appropriate  - Include patient/family/caregiver in decisions related to nutrition  Outcome: Progressing     Problem: PAIN - ADULT  Goal: Verbalizes/displays adequate comfort level or baseline comfort level  Description: Interventions:  - Encourage patient to monitor pain and request assistance  - Assess pain using appropriate pain scale  - Administer analgesics based on type and severity of pain and evaluate response  - Implement non-pharmacological measures as appropriate and evaluate response  - Consider cultural and social influences on pain and pain management  - Notify physician/advanced practitioner if interventions unsuccessful or patient reports new pain  Outcome: Progressing     Problem: INFECTION - ADULT  Goal: Absence or prevention of progression during  hospitalization  Description: INTERVENTIONS:  - Assess and monitor for signs and symptoms of infection  - Monitor lab/diagnostic results  - Monitor all insertion sites, i.e. indwelling lines, tubes, and drains  - Monitor endotracheal if appropriate and nasal secretions for changes in amount and color  - Clive appropriate cooling/warming therapies per order  - Administer medications as ordered  - Instruct and encourage patient and family to use good hand hygiene technique  - Identify and instruct in appropriate isolation precautions for identified infection/condition  Outcome: Progressing  Goal: Absence of fever/infection during neutropenic period  Description: INTERVENTIONS:  - Monitor WBC    Outcome: Progressing     Problem: SAFETY ADULT  Goal: Patient will remain free of falls  Description: INTERVENTIONS:  - Educate patient/family on patient safety including physical limitations  - Instruct patient to call for assistance with activity   - Consult OT/PT to assist with strengthening/mobility   - Keep Call bell within reach  - Keep bed low and locked with side rails adjusted as appropriate  - Keep care items and personal belongings within reach  - Initiate and maintain comfort rounds  - Make Fall Risk Sign visible to staff  - Offer Toileting every  Hours, in advance of need  - Initiate/Maintain alarm  - Obtain necessary fall risk management equipment:   - Apply yellow socks and bracelet for high fall risk patients  - Consider moving patient to room near nurses station  Outcome: Progressing  Goal: Maintain or return to baseline ADL function  Description: INTERVENTIONS:  -  Assess patient's ability to carry out ADLs; assess patient's baseline for ADL function and identify physical deficits which impact ability to perform ADLs (bathing, care of mouth/teeth, toileting, grooming, dressing, etc.)  - Assess/evaluate cause of self-care deficits   - Assess range of motion  - Assess patient's mobility; develop plan if  impaired  - Assess patient's need for assistive devices and provide as appropriate  - Encourage maximum independence but intervene and supervise when necessary  - Involve family in performance of ADLs  - Assess for home care needs following discharge   - Consider OT consult to assist with ADL evaluation and planning for discharge  - Provide patient education as appropriate  Outcome: Progressing  Goal: Maintains/Returns to pre admission functional level  Description: INTERVENTIONS:  - Perform AM-PAC 6 Click Basic Mobility/ Daily Activity assessment daily.  - Set and communicate daily mobility goal to care team and patient/family/caregiver.   - Collaborate with rehabilitation services on mobility goals if consulted  - Perform Range of Motion  times a day.  - Reposition patient every  hours.  - Dangle patient  times a day  - Stand patient  times a day  - Ambulate patient  times a day  - Out of bed to chair  times a day   - Out of bed for meals times a day  - Out of bed for toileting  - Record patient progress and toleration of activity level   Outcome: Progressing     Problem: DISCHARGE PLANNING  Goal: Discharge to home or other facility with appropriate resources  Description: INTERVENTIONS:  - Identify barriers to discharge w/patient and caregiver  - Arrange for needed discharge resources and transportation as appropriate  - Identify discharge learning needs (meds, wound care, etc.)  - Arrange for interpretive services to assist at discharge as needed  - Refer to Case Management Department for coordinating discharge planning if the patient needs post-hospital services based on physician/advanced practitioner order or complex needs related to functional status, cognitive ability, or social support system  Outcome: Progressing     Problem: Knowledge Deficit  Goal: Patient/family/caregiver demonstrates understanding of disease process, treatment plan, medications, and discharge instructions  Description: Complete  learning assessment and assess knowledge base.  Interventions:  - Provide teaching at level of understanding  - Provide teaching via preferred learning methods  Outcome: Progressing

## 2024-05-23 NOTE — PROGRESS NOTES
Patient:    MRN:  [id number]    Vilma Request ID:  [id number]    Level of care reserved:  Home Health Agency    Partner Reserved:  [facility], [place] [address] [phone]    Clinical needs requested:    Geography searched:  [phone]    Start of Service:    Request sent:  2:54pm EDT on 5/23/2024 by Alfonso Estevez    Partner reserved:  3:09pm EDT on 5/23/2024 by Alfonso Estevez    Choice list shared:  3:09pm EDT on 5/23/2024 by Alfonso Estevez

## 2024-05-23 NOTE — PROGRESS NOTES
"Progress Note - Trauma ICU Transfer   and Tertiary Survery   Brian Durant 68 y.o. male [id number]   Unit/Bed#: ICU 07 Encounter: [id number]     Assessment & Plan   Summary of Diagnosed Injuries:       PLAN:  Neuro: Multimodal analgesia. S/p 10mg/kg phenobarbital, continue CIWA. Thiamine/folate/MVI. Home Prozac daily. Home keppra 1g BID. OT cog evaluation pending. Gerontology consult pending.   CV: Holding home metoprolol succiante and lisinopril while low-normal HR and BP. Maintain MAP > 65.   Pulm: No acute issues. IS. Maintain SpO2 > 90%  GI: Diabetic diet, bowel regimen.   : BMP in AM. No durant. No diuretics   Heme: Start heparin gtt in AM for bridge to warfarin if AM hemoglobin stable. Monitor right thigh hematoma. No further hemoglobin checks today. Lovenox for VTE proph  ID: No acute issues   Endo: SSI AC/HS. Goal -180  MSK: Monitor right thigh for skin necrosis. PT/OT    VTE Prophylaxis:Heparin     Disposition: Med surg    Code status:  Level 1 - Full Code    Consultants: IP CONSULT TO CASE MANAGEMENT  IP CONSULT TO NUTRITION SERVICES  INPATIENT CONSULT TO IR     Subjective   Mechanism of Injury:Fall     HPI/Last 24 hour events:   Per Irma Rojas PA-C on 5/22/24  \"69 y/o M w/ PMHx of DVT s/p thrombolysis c/b ICH/SDH and subsequent seizures, Factor V Leiden w/ DVT and PE history on coumadin, DM2, HTN, HLD, alcohol abuse, and depression who presented to the emergency department today after a fall yesterday while cleaning his pool. He is unsure if he became dizzy prior to the fall, unclear headstrike. Fell directly onto his right hip. Today, he continued to have pain and difficulty ambulating prompting ER evaluation. Initial CTA of RLE showed a 13cm hematoma of the posterolateral proximal right thigh with two separate foci of active bleeding. He received Vitamin K and PCC for coumadin reversal and was taken to IR for embolization of two proximal profunda branches, though no active bleeding was " "noted. He received 2u prbcs during procedure\"    Reason for ICU admission: Risk of ongoing bleeding, ETOH withdrawal     Summary of ICU clinical course:   Patient returned from IR hemodynamically WNL and did not require further blood products. Hemoglobin stable overnight. After loading dose of phenobarbital, no evidence of alcohol withdrawal. Able to tolerate oral diet and ambulated in hallway. Patient disoriented at times and forgetful, family requested cognitive evaluation, which is pending.     Recent or scheduled procedures:   5/22/24 IR: 2 proximal profunda branches supplying the region of a hematoma embolized with Gelfoam.     Outstanding/pending diagnostics: None       Objective   Vitals:   Temp:  [98.2 °F (36.8 °C)-98.7 °F (37.1 °C)] 98.4 °F (36.9 °C)  HR:  [60-86] 82  Resp:  [13-31] 16  BP: ()/(51-73) 85/54  Arterial Line BP: ()/(42-70) 94/42    I/O         05/21 0701  05/22 0700 05/22 0701  05/23 0700 05/23 0701  05/24 0700    I.V.  3098.3 683.3    Blood  350     IV Piggyback  1350     Total Intake  4798.3 683.3    Urine  5200 1400    Total Output  5200 1400    Net  -401.7 -716.7                    Physical Exam:   Physical Exam  Vitals and nursing note reviewed.   Constitutional:       General: He is awake. He is not in acute distress.     Appearance: Normal appearance.   HENT:      Head: Normocephalic and atraumatic.      Mouth/Throat:      Lips: Pink.      Mouth: Mucous membranes are moist.   Eyes:      General: Lids are normal. No scleral icterus.     Pupils: Pupils are equal, round, and reactive to light.   Cardiovascular:      Rate and Rhythm: Normal rate and regular rhythm.      Pulses:           Radial pulses are 2+ on the right side and 2+ on the left side.        Dorsalis pedis pulses are 2+ on the right side and 2+ on the left side.      Heart sounds: Normal heart sounds.   Pulmonary:      Effort: Pulmonary effort is normal.      Breath sounds: Normal breath sounds.   Abdominal:      " General: Abdomen is protuberant. There is no distension.      Palpations: Abdomen is soft.      Tenderness: There is no abdominal tenderness.   Musculoskeletal:      Cervical back: Full passive range of motion without pain.      Right lower leg: Deformity and tenderness present. No edema.      Left lower leg: No edema.        Legs:    Skin:     General: Skin is warm and dry.      Capillary Refill: Capillary refill takes less than 2 seconds.      Findings: Ecchymosis present.          Neurological:      General: No focal deficit present.      Mental Status: He is alert and oriented to person, place, and time.      GCS: GCS eye subscore is 4. GCS verbal subscore is 5. GCS motor subscore is 6.      Sensory: Sensation is intact. No sensory deficit.      Motor: Weakness (Right hip flexion 3+/5. Dorsiflexion 5/5, plantarflexion 5/5) present. No tremor.   Psychiatric:         Behavior: Behavior is cooperative.           Invasive Devices       Peripheral Intravenous Line  Duration             Peripheral IV 05/22/24 Distal;Left;Ventral (anterior) Forearm 1 day    Peripheral IV 05/22/24 Right;Upper Arm <1 day                   Rationale for remaining devices: N/A    1. Before the illness or injury that brought you to the Emergency, did you need someone to help you on a regular basis? 1=Yes   2. Since the illness or injury that brought you to the Emergency, have you needed more help than usual to take care of yourself? 1=Yes   3. Have you been hospitalized for one or more nights during the past 6 months (excluding a stay in the Emergency Department)? 0=No   4. In general, do you see well? 0=Yes   5. In general, do you have serious problems with your memory? 1=Yes   6. Do you take more than three different medications everyday? 1=Yes   TOTAL   4     Did you order a geriatric consult if the score was 2 or greater?: yes       Lab Results:   Results from last 7 days   Lab Units 05/23/24  0904 05/23/24  0305 05/22/24 2018  05/22/24  1515 05/22/24  1137 05/22/24  0749   WBC Thousand/uL  --   --   --  6.72  --  9.75   HEMOGLOBIN g/dL 9.6* 9.5* 10.0* 10.6*  --  10.6*   I STAT HEMOGLOBIN g/dl  --   --   --   --  9.2*  --    HEMATOCRIT % 29.1* 28.2* 29.8* 31.4*  --  31.7*   HEMATOCRIT, ISTAT %  --   --   --   --  27*  --    PLATELETS Thousands/uL  --   --   --  199  --  237   SEGS PCT %  --   --   --  69  --  76*   MONO PCT %  --   --   --  9  --  8   EOS PCT %  --   --   --  0  --  0     Results from last 7 days   Lab Units 05/23/24  0450 05/22/24  1515 05/22/24  1137 05/22/24  0749   SODIUM mmol/L 139 135  --  127*   POTASSIUM mmol/L 4.1 4.2  --  4.3   CHLORIDE mmol/L 107 107  --  99   CO2 mmol/L 25 21  --  17*   CO2, I-STAT mmol/L  --   --  19*  --    ANION GAP mmol/L 7 7  --  11   BUN mg/dL 13 18  --  23   CREATININE mg/dL 1.11 1.19  --  1.50*   CALCIUM mg/dL 7.5* 8.3*  --  8.1*   ALT U/L  --  13  --  14   AST U/L  --  14  --  17   ALK PHOS U/L  --  24*  --  28*   ALBUMIN g/dL  --  3.4*  --  3.2*   TOTAL BILIRUBIN mg/dL  --  1.53*  --  0.71     Results from last 7 days   Lab Units 05/23/24  0450 05/22/24  1515   MAGNESIUM mg/dL 2.1 1.8*   PHOSPHORUS mg/dL 2.2* 3.5      Results from last 7 days   Lab Units 05/22/24  1515 05/22/24  0749 05/20/24  1255   INR  2.15* 4.57* 4.0             ABG:  Lab Results   Component Value Date    PHART 7.407 05/22/2024    NWK6VJZ 33.3 (L) 05/22/2024    PO2ART 108.6 05/22/2024    SWM3VFF 20.5 (L) 05/22/2024    BEART -3.5 05/22/2024    SOURCE Line, Arterial 05/22/2024             Imaging Results: I have personally reviewed pertinent reports.      Result Date: 5/22/2024  XR CHEST PA & LATERAL Impression: No active pulmonary disease.     Result Date: 5/22/2024  CT BRAIN - WITHOUT CONTRAST Impression: No evidence of acute intracranial process. Chronic microangiopathy. Chronic right PCA territory infarction.     Result Date: 5/22/2024  CT ARTERIOGRAM OF THE  RIGHT  LOWER EXTREMITY(IES) WITH IV CONTRAST:  Impression: 13 cm hematoma extends through the posterolateral proximal right thigh. There are 2 separate foci of active bleeding. The study was marked in EPIC for immediate notification.     FAST: Unknown     Code Status: Level 1 - Full Code       Patient seen and evaluated by Critical Care today and deemed to be appropriate for transfer to Med Surg. Spoke to Jay Cloud PA-C from Trauma service regarding transfer. Critical Care can be contacted via [employer] with any questions or concerns.

## 2024-05-23 NOTE — CONSULTS
Consultation - Geriatric Medicine   Brian Lal 68 y.o. male MRN: [id number]  Unit/Bed#: [facility] 602-01 Encounter: [id number]      Assessment & Plan     Ambulatory dysfunction with fall  -Reportedly mechanical fall at home on 5/21/2024  -(Unknown) head strike (unknown) loss of consciousness  -injuries as outlined below  -Does not require use of assist device for ambulation at baseline  -Denies history of prior or recurrent falls (family reported concern on admission patient may be under reporting number of falls)  -high risk future falls due to age, deconditioning/debility, ETOH use disorder, impaired vision and unfamiliar environment   -encourage good body mechanics and assist with all transfers  -keep personal items and call bell close to prevent reaching  -maintain environment free of fall hazards  -encourage appropriate footwear and adequate lighting at all times when out of bed  -consider home fall risk assessment on return home  -PT and OT following    Right proximal thigh hematoma   -S/p fall as outlined above  -CTA RLE with and without contrast 5/22/2024 reports 13 cm hematoma extending through posterior lateral proximal right thigh with 2 separate foci of active bleeding  -s/p embolization with IR 5/22/2024  -Hemoglobin trended since admission 10.6>>9.2  -Continue to monitor for ongoing acute blood loss  -tx with IVF and PRBC as indicated  -Neurovascular checks per protocol  -Acute multimodal pain control    Acute pain due to trauma   -Recommend pain control per Geriatric pain protocol:  Tylenol 975mg Q8H scheduled  Roxicodone 2.5mg Q4H PRN moderate pain  Roxicodone 5mg Q4H PRN severe pain  Dilaudid 0.2mg Q4H PRN  -Consider adjuncts such as lidocaine patch topically to appropriate areas  -encourage addition of non-pharmacologic pain treatment including ice and frequent repositioning  -recommend  bowel regimen to prevent and treat constipation due to increased risk with acute pain and opiate pain  medications    ETOH use disorder  -Patient endorses daily EtOH consumption  -Encourage cessation, continue daily bedside cessation counseling  -WA protocol -required dose of phenobarbital in ICU earlier in admission  -Thiamine and folate supplementation    Anxiety  -Patient reports symptoms well-controlled with home Ativan regimen reporting use approximately 3 times weekly  -Previously on Prozac for post-CVA recovery, no longer taking as outpatient, patient does not feel need or wish to resume at this time, recommended discontinuation  -Encourage calm quiet environment to reduce risk of overstimulation  -Consider outpatient referral to counseling/support group as part of comprehensive multimodal treatment approach  -Continue close outpatient follow-up with PCP for ongoing management    DM-II  -No recent A1c on record, recommend checking with routine labs  -Home regimen as outlined below  -Continue ISS coverage to goal glucose 140-180 during hospitalization to reduce risk hypoglycemia  -Continue close outpatient follow-up with PCP for ongoing management as well as age-appropriate diabetic screenings and cares    Seizure disorder  -Maintained on Keppra chronically as outpatient  -Continue seizure precautions  -Continue close outpatient follow-up with Neurology for ongoing management    Factor V Leiden mutation  -With history of DVT and PE  -Maintained on Coumadin chronically as outpatient, held and reversed on admission for right thigh hematoma as above    Cognitive screening  -Alert and oriented, denies memory or cognitive concerns however per record test performed poorly on memory test previously as o/p, testing and additional records are not available for review  -Consider cognitive testing with OT during current hospitalization versus comprehensive geriatric assessment as outpatient to establish new baseline as patient reports independence with ADLs and IADLs at baseline  -[facility] 5/22/2024 imaging personally  viewed, reveals at least moderate diffuse chronic microangiopathic changes as well as large area of encephalitis of right posterior temporal occipital regions consistent with known prior CVA  -TSH WNL 0.957, B12   -At risk age, cardiovascular and ETOH related cognitive decline, encourage secondary center modifications including good cardiovascular health, healthy lifestyle modifications, cessation of EtOH use  -Encourage patient main physically, socially, cognitively active and engaged to maintain cognitive acuity  -Encourage use of sensory assist devices such as corrective lenses at all appropriate times to reduce risk of uncorrected sensory impairment from continuing to isolation, confusion, encephalopathy and more precipitous cognitive decline    Impaired Vision  -recommend use of corrective lenses at all appropriate times  -encourage adequate lighting and encourage use of assistance with ambulation  -keep personal belongings close to person to avoid reaching  -encourage appropriate footwear at all times    Deconditioning/debility/frailty   -Clinical frailty scale stage IV, vulnerable, progressive  -Multifactorial including age, ambulatory dysfunction with fall, history of CVA, DM-II and multitude of additional chronic medical comorbidities now with acute traumatic injury superimposed in elderly individual with limited physiologic and metabolic reserve increasing sensitivity to even seemingly mild additional metabolic derangement/stressors  -Continue optimization of chronic medical conditions and address acute metabolic derangements as arise  -Ensure underlying anxiety/mood to depression symptoms are well-controlled as may impact patient response to therapies as well as overall sense of wellbeing and quality of life  -Continue to ensure that treatments and interventions align with patient's wishes and goals of care  -Continue psychosocial supports of patient and caregivers    Delirium precautions  -Patient  is high risk of delirium due to age, fall, EtOH use disorder with history of withdrawal, acute pain, hospitalization, suspected some degree of underlying cognitive impairment  -Continue delirium precautions  -maintain normal sleep/wake cycle  -ensure that pain is well controlled -see above  -monitor for fecal and urinary retention which may precipitate delirium  -encourage early mobilization and ambulation with assist as cleared to safely do so  -provide frequent reorientation and redirection as indicated and appropriate  -encourage family and friends at the bedside to help calm patient if anxious as well as provide familiarity and reassurance  -avoid medications which may precipitate or worsen delirium such as tramadol, anticholinergics, and benadryl as possible  -encourage hydration and nutrition   -redirect unwanted behaviors as first line  -Rule out alcohol withdrawal as precipitating factor prior to consideration of use of other delirium protocol for pharmacologic intervention    Home medication review  [facility] and personally confirmed with patient's wife who manages his medications:    Glipizide ER 10mg in am   AND  Glipizide ER 5mg in evening   Coumadin 3.5mg daily -dosed per INR  Atorvastatin 10 Mg daily  Jardiance 25 Mg daily  Fenofibrate 160 Mg daily  Keppra 500 Mg in am  AND  Keppra 1000 Mg in evening  Lisinopril 5 Mg daily  Ativan 0.25 Mg 3 times daily as needed  Toprol-XL 50 Mg daily  ASA 81 Mg daily  B1 100 Mg daily  B12 supplement every third day  Vitamin D 2000 IU daily      NO LONGER TAKING PROZAC    Care coordination: Rounded with Joanna (RN)    History of Present Illness   Physician Requesting Consult: Macario Nogueira MD  Reason for Consult / Principal Problem: Fall  Hx and PE limited by: N/A  Additional history obtained from: Chart review and patient evaluation, daughter at bedside and wife on phone to confirm home medications     HPI: Brian Lal is a 68 y.o. year old male with  history of PE, EtOH use disorder with history of withdrawal, hypertension, hyperlipidemia, CKD-III, DM-II, history of prostate cancer, seizure disorder, history of subdural hematoma and hx DVT RLSUMI who is admitted to the trauma service with ambulatory dysfunction with fall found to have right proximal thigh hematoma and is being seen in consultation by Geriatrics for high risk developing delirium during hospitalization.  Brian is seen examined at bedside where he is lying resting actively, he explains that he sustained a mechanical fall at home when he was cleaning the pool and lost his balance falling into the pool striking his right thigh on the concrete in the process.  He is uncertain if he hit his head or lost consciousness but was able to get himself safely out of the pool.  On admission he was found to have right proximal thigh hematoma she underwent embolization with IR on 5/22/2024.  He reports some pain in his right leg since falling otherwise notes being in his usual state health and denies additional acute complaints at this time.  Yassine reports that prior to admission he residing with his wife and that he was fully independent with ADLs and IADLs denying memory or cognitive concerns stating that he feels his memory is better than others his same age.  He denies use of assistive ice ambulation at baseline or history of recurrent falls although on admission it was noted family had concerns that he may be having falls that he is not reporting and that he may be consuming more alcohol than he is reporting.  He requires use of corrective lenses for reading only and denies use of hearing aids or dentures.  He endorses daily alcohol consumption with history of withdrawal during prior hospitalizations.    Inpatient consult to Gerontology  Consult performed by: Alyse Wood DO  Consult ordered by: SALVATORE Hobson        Review of Systems   Constitutional: Negative.  Negative for chills and fever.    HENT: Negative.  Negative for dental problem.    Eyes:  Positive for visual disturbance (Glasses for reading).   Respiratory: Negative.  Negative for shortness of breath.    Cardiovascular: Negative.  Negative for chest pain.   Gastrointestinal: Negative.    Genitourinary: Negative.    Musculoskeletal:         Right thigh sore   Skin: Negative.    Neurological: Negative.  Negative for dizziness, light-headedness, numbness and headaches.   Hematological:  Bruises/bleeds easily.   Psychiatric/Behavioral:  Negative for sleep disturbance. The patient is nervous/anxious.    All other systems reviewed and are negative.    Historical Information   Past Medical History:   Diagnosis Date    Diabetes (HCC)     Diabetes mellitus (HCC)     DVT (deep venous thrombosis) (HCC)     Dyslipidemia     Factor V deficiency (HCC)     Hypertension     Lobar cerebral hemorrhage (HCC)     Multiple pulmonary emboli (HCC)     Seizures (HCC) 2017    Stroke (HCC) 2016     Past Surgical History:   Procedure Laterality Date    IVC FILTER INSERTION      IVC umbrella    PROSTATE BIOPSY Bilateral 2018    Dr. Riley      Social History   Social History     Substance and Sexual Activity   Alcohol Use No    Comment: 10 beer/week     Social History     Substance and Sexual Activity   Drug Use No     Social History     Tobacco Use   Smoking Status Former    Current packs/day: 0.00    Types: Cigarettes    Quit date: 1989    Years since quittin.4   Smokeless Tobacco Never     Family History:   Family History   Problem Relation Age of Onset    Kidney disease Father     Hypertension Father     Diabetes Father     Heart attack Father     Stroke Father     Hypertension Mother     Heart attack Mother     Hypertension Brother      Meds/Allergies   all current active meds have been reviewed    Allergies   Allergen Reactions    Penicillins Hives     hives, swelling- from youth       Objective     Intake/Output Summary (Last 24 hours)  at 5/23/2024 1640  Last data filed at 5/23/2024 1501  Gross per 24 hour   Intake 3101.66 ml   Output 4100 ml   Net -998.34 ml     Invasive Devices       Peripheral Intravenous Line  Duration             Peripheral IV 05/22/24 Distal;Left;Ventral (anterior) Forearm 1 day    Peripheral IV 05/22/24 Right;Upper Arm <1 day                  Physical Exam  Vitals and nursing note reviewed.   Constitutional:       General: He is not in acute distress.     Appearance: He is not toxic-appearing.   HENT:      Head: Normocephalic.      Nose: Nose normal.      Mouth/Throat:      Mouth: Mucous membranes are moist.      Comments: Dentition grossly intact  Eyes:      General: No scleral icterus.        Right eye: No discharge.         Left eye: No discharge.      Conjunctiva/sclera: Conjunctivae normal.   Neck:      Comments: Phonation normal  Cardiovascular:      Rate and Rhythm: Normal rate and regular rhythm.   Pulmonary:      Effort: Pulmonary effort is normal. No respiratory distress.      Breath sounds: No wheezing.      Comments: Saturating well on room air  Abdominal:      General: Bowel sounds are normal. There is no distension.      Palpations: Abdomen is soft.      Tenderness: There is no abdominal tenderness.   Musculoskeletal:      Cervical back: Neck supple.      Right lower leg: No edema.      Left lower leg: No edema.      Comments: Overall muscle bulk and tone consistent with age and activity level   Skin:     General: Skin is warm and dry.      Comments: Ecchymosis right inner forearm   Neurological:      Mental Status: He is alert.      Comments: Awake and alert, oriented and answers questions appropriately   Psychiatric:      Comments: Does not appear restless       Lab Results:     I have personally reviewed pertinent lab results including the following:    Results from last 7 days   Lab Units 05/24/24  0448 05/23/24  0904 05/23/24  0305 05/22/24 2018 05/22/24  1515 05/22/24  1137 05/22/24  0749   WBC  Thousand/uL 7.31  --   --   --  6.72  --  9.75   HEMOGLOBIN g/dL 9.2* 9.6* 9.5*   < > 10.6*  --  10.6*   I STAT HEMOGLOBIN   --   --   --   --   --    < >  --    HEMATOCRIT % 28.6* 29.1* 28.2*   < > 31.4*  --  31.7*   HEMATOCRIT, ISTAT   --   --   --   --   --    < >  --    PLATELETS Thousands/uL 143*  --   --   --  199  --  237   SEGS PCT %  --   --   --   --  69  --  76*   MONO PCT %  --   --   --   --  9  --  8   EOS PCT %  --   --   --   --  0  --  0    < > = values in this interval not displayed.     Results from last 7 days   Lab Units 05/24/24  0448 05/23/24  0450 05/22/24  1515 05/22/24  1137 05/22/24  0749   POTASSIUM mmol/L 4.3 4.1 4.2  --  4.3   CHLORIDE mmol/L 106 107 107  --  99   CO2 mmol/L 25 25 21  --  17*   CO2, I-STAT mmol/L  --   --   --  19*  --    BUN mg/dL 13 13 18  --  23   CREATININE mg/dL 1.13 1.11 1.19  --  1.50*   CALCIUM mg/dL 8.0* 7.5* 8.3*  --  8.1*   ALK PHOS U/L  --   --  24*  --  28*   ALT U/L  --   --  13  --  14   AST U/L  --   --  14  --  17   GLUCOSE, ISTAT mg/dl  --   --   --  92  --      I have personally reviewed the following imaging study reports in PACS:    5/12/2024-portable chest x-ray, [facility]  5/22/2024-chest x-ray, [facility], CTA right lower extremity    Therapies:   PT: Following  OT: Following    VTE Prophylaxis: Enoxaparin (Lovenox)    Code Status: Level 1 - Full Code  Advance Directive and Living Will:      Power of :    POLST:      Family and Social Support:   Living Arrangements: Lives w/ Spouse/significant other  Support Systems: Self  Type of Current Residence: 2 story home  Discharge planning discussed with:: pt  Freedom of Choice: Yes    Goals of Care: Recovery from acute injuries

## 2024-05-23 NOTE — PLAN OF CARE
"  Problem: PHYSICAL THERAPY ADULT  Goal: Performs mobility at highest level of function for planned discharge setting.  See evaluation for individualized goals.  Description: Treatment/Interventions: OT, Spoke to case management, Spoke to nursing, Gait training, Bed mobility, Patient/family training, Endurance training, LE strengthening/ROM, Functional transfer training          See flowsheet documentation for full assessment, interventions and recommendations.  Note: Prognosis: Good  Problem List: Decreased strength, Decreased range of motion, Decreased endurance, Impaired balance, Decreased mobility, Decreased coordination, Decreased cognition, Impaired judgement, Decreased safety awareness, Pain  Assessment: Pt is 68 y.o. male seen for PT evaluation s/p admit to [facility] on 5/22/2024 w/ Hematoma of right thigh s/p fall. PT consulted to assess pt's functional mobility and d/c needs. Order placed for PT eval and tx, w/ up w/ A order. Comorbidities affecting pt's physical performance at time of assessment include:  has a past medical history of Diabetes (HCC), Diabetes mellitus (HCC), DVT (deep venous thrombosis) (HCC), Dyslipidemia, Factor V deficiency (HCC), Hypertension, Lobar cerebral hemorrhage (HCC), Multiple pulmonary emboli (HCC), Seizures (HCC), and Stroke (HCC). PTA, pt was lives in multi-level home, has \"few\" FARRAH, and is I although has very mild residual weakness on L and visual deficits post CVA 8 years prior . Personal factors affecting pt at time of IE include: inaccessible home environment, lives in 2 story house, stairs to enter home, positive fall history, visual impairments, impulsivity, unable to perform physical activity, limited insight into impairments, inability to perform IADLs, and inability to perform ADLs. Please find objective findings from PT assessment regarding body systems outlined above with impairments and limitations including impaired balance, decreased endurance, " impaired coordination, gait deviations, pain, decreased activity tolerance, decreased functional mobility tolerance, decreased safety awareness, impaired judgement, and fall risk. Pt required increased time with increased pain in R thigh for bed mobility. Ambulated with slow shuffling gait although no noted LOB with use of RW. Recommend continued use of RW at this time. Spouse notes that pt has decreased clearance and increased shuffling compared to baseline. Required occasional instruction to avoid objects in halls in unfamiliar setting with new AD use. Spouse also noted concerns with current visual deficits. The following objective measures performed on IE also reveal limitations: The patient's AM-PAC Basic Mobility Inpatient Short Form Raw Score is 18, Standardized Score is 41.05. A standardized score less than 42.9 suggests the patient may benefit from discharge to home although anticipate pt will progress to achieve goals to return home with increased support and possible AD use. Please also refer to the recommendation of the Physical Therapist for safe discharge planning.  Barriers to Discharge: Inaccessible home environment     Rehab Resource Intensity Level, PT: III (Minimum Resource Intensity)    See flowsheet documentation for full assessment.

## 2024-05-23 NOTE — PHYSICAL THERAPY NOTE
"Physical Therapy Evaluation     Patient's Name: Brian Lal    Admitting Diagnosis  Altered mental status [R41.82]  Subcutaneous hematoma [T14.8XXA]    Problem List  Patient Active Problem List   Diagnosis    Elevated PSA    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 localized onset, not intractable, without status epilepticus (HCC)    History of cerebral hemorrhage    Prostate CA (HCC)    Sleep disturbance    History of prostate cancer    Renal cyst    Hematoma of right thigh       Past Medical History  Past Medical History:   Diagnosis Date    Diabetes (HCC)     Diabetes mellitus (HCC)     DVT (deep venous thrombosis) (HCC) 2016    Dyslipidemia     Factor V deficiency (HCC)     Hypertension     Lobar cerebral hemorrhage (HCC)     Multiple pulmonary emboli (HCC)     Seizures (HCC) 07/2017    Stroke (HCC) 12/14/2016       Past Surgical History  Past Surgical History:   Procedure Laterality Date    IVC FILTER INSERTION      IVC umbrella    PROSTATE BIOPSY Bilateral 09/06/2018    Dr. Riley         05/23/24 1116   PT Last Visit   PT Visit Date 05/23/24   Note Type   Note type Evaluation   Pain Assessment   Pain Assessment Tool 0-10   Pain Score 6   Pain Location/Orientation Orientation: Right   Hospital Pain Intervention(s) Repositioned;Ambulation/increased activity   Restrictions/Precautions   Weight Bearing Precautions Per Order No   Other Precautions Chair Alarm;Bed Alarm   Home Living   Type of Home House   Home Layout Two level;1/2 bath on main level  (pt reports abiilty to sleep on first floor. \"few FARRAH\")   Additional Comments Pt and spouse report chronic weakness post prior CVA although does not require AD   Prior Function   Level of Natalia Independent with functional mobility   Lives With Spouse   Receives Help From Family   Falls in the last 6 months 1 to 4  (2)   Vocational Retired   General   Family/Caregiver Present Yes  (spouse and son) "   Cognition   Orientation Level Oriented X4   Subjective   Subjective Pt willing and agreeable to PT session   RLE Assessment   RLE Assessment WFL   LLE Assessment   LLE Assessment WFL  (mild residual weakness on L side.)   Coordination   Movements are Fluid and Coordinated 0   Coordination and Movement Description slow and guarded compared to baseine, spouse reports she notices increased shuffling compared to baseline.   Bed Mobility   Supine to Sit 4  Minimal assistance   Additional items Assist x 1   Sit to Supine Unable to assess   Transfers   Sit to Stand 4  Minimal assistance   Additional items Assist x 1   Stand to Sit 4  Minimal assistance   Additional items Assist x 1   Ambulation/Elevation   Gait pattern Excessively slow;Short stride;Shuffling;Decreased foot clearance;Forward Flexion   Gait Assistance 4  Minimal assist   Additional items Assist x 1   Assistive Device Rolling walker   Distance 40+30+50+15+15   Balance   Static Sitting Fair +   Dynamic Sitting Fair   Static Standing Fair -   Dynamic Standing Poor +   Ambulatory Poor +   Endurance Deficit   Endurance Deficit Yes   Endurance Deficit Description limited by fatigue, weakness, balance, and vision compared to baseline   Activity Tolerance   Activity Tolerance Patient limited by fatigue;Patient limited by pain   Medical Staff Made Aware OT   Nurse Made Aware yes   Assessment   Prognosis Good   Problem List Decreased strength;Decreased range of motion;Decreased endurance;Impaired balance;Decreased mobility;Decreased coordination;Decreased cognition;Impaired judgement;Decreased safety awareness;Pain   Assessment Pt is 68 y.o. male seen for PT evaluation s/p admit to [facility] on 5/22/2024 w/ Hematoma of right thigh s/p fall. PT consulted to assess pt's functional mobility and d/c needs. Order placed for PT eval and tx, w/ up w/ A order. Comorbidities affecting pt's physical performance at time of assessment include:  has a past medical  "history of Diabetes (HCC), Diabetes mellitus (HCC), DVT (deep venous thrombosis) (HCC), Dyslipidemia, Factor V deficiency (HCC), Hypertension, Lobar cerebral hemorrhage (HCC), Multiple pulmonary emboli (HCC), Seizures (HCC), and Stroke (HCC). PTA, pt was lives in multi-level home, has \"few\" FARRAH, and is I although has very mild residual weakness on L and visual deficits post CVA 8 years prior . Personal factors affecting pt at time of IE include: inaccessible home environment, lives in 2 story house, stairs to enter home, positive fall history, visual impairments, impulsivity, unable to perform physical activity, limited insight into impairments, inability to perform IADLs, and inability to perform ADLs. Please find objective findings from PT assessment regarding body systems outlined above with impairments and limitations including impaired balance, decreased endurance, impaired coordination, gait deviations, pain, decreased activity tolerance, decreased functional mobility tolerance, decreased safety awareness, impaired judgement, and fall risk. Pt required increased time with increased pain in R thigh for bed mobility. Ambulated with slow shuffling gait although no noted LOB with use of RW. Recommend continued use of RW at this time. Spouse notes that pt has decreased clearance and increased shuffling compared to baseline. Required occasional instruction to avoid objects in halls in unfamiliar setting with new AD use. Spouse also noted concerns with current visual deficits. The following objective measures performed on IE also reveal limitations: The patient's AM-PAC Basic Mobility Inpatient Short Form Raw Score is 18, Standardized Score is 41.05. A standardized score less than 42.9 suggests the patient may benefit from discharge to home although anticipate pt will progress to achieve goals to return home with increased support and possible AD use. Please also refer to the recommendation of the Physical Therapist " for safe discharge planning. Pt's clinical presentation is currently unstable/unpredictable seen in pt's presentation of critical care monitoring. Pt to benefit from continued PT tx to address deficits as defined above and maximize level of functional independent mobility and consistency. From PT/mobility standpoint, recommendation at time of d/c would be level III pending progress in order to facilitate return to PLOF.   Barriers to Discharge Inaccessible home environment   Goals   Patient Goals To go home   STG Expiration Date 06/04/24   Short Term Goal #1 1. Complete bed mobility and transfers I to decrease need for caregiver in home. 2. Ambulate 300' I to complete household and community mobility without A. 3. Improve dynamic balance to good to decrease need for UE support during ambulation. 4. Be educated & demonstate 12 steps to be able to enter home without A.   Plan   Treatment/Interventions OT;Spoke to case management;Spoke to nursing;Gait training;Bed mobility;Patient/family training;Endurance training;LE strengthening/ROM;Functional transfer training   PT Frequency 3-5x/wk   Discharge Recommendation   Rehab Resource Intensity Level, PT III (Minimum Resource Intensity)   AM-PAC Basic Mobility Inpatient   Turning in Flat Bed Without Bedrails 3   Lying on Back to Sitting on Edge of Flat Bed Without Bedrails 3   Moving Bed to Chair 3   Standing Up From Chair Using Arms 3   Walk in Room 3   Climb 3-5 Stairs With Railing 3   Basic Mobility Inpatient Raw Score 18   Basic Mobility Standardized Score 41.05   [facility] Highest Level Of Mobility   -HLM Goal 6: Walk 10 steps or more   [facility]-HLM Achieved 7: Walk 25 feet or more           Corie Olivas, PT

## 2024-05-23 NOTE — PLAN OF CARE
Problem: OCCUPATIONAL THERAPY ADULT  Goal: Performs self-care activities at highest level of function for planned discharge setting.  See evaluation for individualized goals.  Description: Treatment Interventions: ADL retraining, Functional transfer training, Endurance training, Cognitive reorientation, Patient/family training, Equipment evaluation/education, Compensatory technique education, Activityengagement          See flowsheet documentation for full assessment, interventions and recommendations.   Note: Limitation: Decreased ADL status, Decreased Safe judgement during ADL, Decreased cognition, Decreased endurance, Decreased high-level ADLs, Decreased self-care trans  Prognosis: Good  Assessment: Pt is a 68 y.o. male who was admitted to [facility] on 5/22/2024 with change of MS and 2 recent falls - dx with R thigh hematoma s/p embolization . Patient   has a past medical history of Diabetes (HCC), Diabetes mellitus (HCC), DVT (deep venous thrombosis) (HCC), Dyslipidemia, Factor V deficiency (HCC), Hypertension, Lobar cerebral hemorrhage (HCC), Multiple pulmonary emboli (HCC), Seizures (HCC), and Stroke ([facility]).Wife reports daily ETOH consumption  At baseline pt was completing adls and mobility independently - I iadls - shares homemaking with spouse. Pt lives with wife in 2 story home with FFSU available PRN. Currently pt requires min assist for overall ADLS and min assist for functional mobility/transfers. Pt currently presents with impairments in the following categories -steps to enter environment, difficulty performing ADLS, difficulty performing IADLS , limited insight into deficits, health management , and environment endurance, standing balance/tolerance, memory, insight, safety , attention , sequencing , and visual perceptual skills . These impairments, as well as pt's fatigue, SOB, pain, impulsivity, (L) visual deficits , risk for falls, and home environment  limit pt's ability to safely  engage in all baseline areas of occupation, includingbathing, dressing, toileting, functional mobility/transfers, community mobility, laundry , house maintenance, medication management, meal prep, cleaning, social participation , and leisure activities  From OT standpoint, recommend Level III resources upon D/C. OT will continue to follow to address the below stated goals.     Rehab Resource Intensity Level, OT: III (Minimum Resource Intensity)

## 2024-05-24 PROBLEM — D68.51 FACTOR V LEIDEN (HCC): Status: ACTIVE | Noted: 2024-05-24

## 2024-05-24 PROBLEM — Z86.59 HISTORY OF ANXIETY: Status: ACTIVE | Noted: 2024-05-24

## 2024-05-24 PROBLEM — I10 HYPERTENSION: Status: ACTIVE | Noted: 2024-05-24
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
## 2024-05-24 PROBLEM — F10.10 CHRONIC ALCOHOL ABUSE: Status: ACTIVE | Noted: 2024-05-24

## 2024-05-24 PROBLEM — Z86.59 HISTORY OF DEPRESSIVE SYMPTOMS: Status: ACTIVE | Noted: 2024-05-24

## 2024-05-24 PROBLEM — F10.939 ALCOHOL WITHDRAWAL (HCC): Status: ACTIVE | Noted: 2024-05-24

## 2024-05-24 PROBLEM — D62 ACUTE BLOOD LOSS ANEMIA: Status: ACTIVE | Noted: 2024-05-24

## 2024-05-24 PROBLEM — G89.11 ACUTE PAIN DUE TO TRAUMA: Status: ACTIVE | Noted: 2024-05-24

## 2024-05-24 LAB
ANION GAP SERPL CALCULATED.3IONS-SCNC: 8 MMOL/L (ref 4–13)
BUN SERPL-MCNC: 13 MG/DL (ref 5–25)
CALCIUM SERPL-MCNC: 8 MG/DL (ref 8.4–10.2)
CHLORIDE SERPL-SCNC: 106 MMOL/L (ref 96–108)
CO2 SERPL-SCNC: 25 MMOL/L (ref 21–32)
CREAT SERPL-MCNC: 1.13 MG/DL (ref 0.6–1.3)
ERYTHROCYTE [DISTWIDTH] IN BLOOD BY AUTOMATED COUNT: 13.6 % (ref 11.6–15.1)
GFR SERPL CREATININE-BSD FRML MDRD: 65 ML/MIN/1.73SQ M
GLUCOSE SERPL-MCNC: 111 MG/DL (ref 65–140)
GLUCOSE SERPL-MCNC: 151 MG/DL (ref 65–140)
GLUCOSE SERPL-MCNC: 158 MG/DL (ref 65–140)
GLUCOSE SERPL-MCNC: 193 MG/DL (ref 65–140)
GLUCOSE SERPL-MCNC: 97 MG/DL (ref 65–140)
HCT VFR BLD AUTO: 28.6 % (ref 36.5–49.3)
HCT VFR BLD AUTO: 29.7 % (ref 36.5–49.3)
HGB BLD-MCNC: 9.2 G/DL (ref 12–17)
HGB BLD-MCNC: 9.6 G/DL (ref 12–17)
INR PPP: 1.41 (ref 0.84–1.19)
MAGNESIUM SERPL-MCNC: 1.9 MG/DL (ref 1.9–2.7)
MCH RBC QN AUTO: 29.7 PG (ref 26.8–34.3)
MCHC RBC AUTO-ENTMCNC: 32.2 G/DL (ref 31.4–37.4)
MCV RBC AUTO: 92 FL (ref 82–98)
PHOSPHATE SERPL-MCNC: 2.1 MG/DL (ref 2.3–4.1)
PLATELET # BLD AUTO: 143 THOUSANDS/UL (ref 149–390)
PMV BLD AUTO: 11.6 FL (ref 8.9–12.7)
POTASSIUM SERPL-SCNC: 4.3 MMOL/L (ref 3.5–5.3)
PROTHROMBIN TIME: 17 SECONDS (ref 11.6–14.5)
RBC # BLD AUTO: 3.1 MILLION/UL (ref 3.88–5.62)
SODIUM SERPL-SCNC: 139 MMOL/L (ref 135–147)
WBC # BLD AUTO: 7.31 THOUSAND/UL (ref 4.31–10.16)

## 2024-05-24 PROCEDURE — 85610 PROTHROMBIN TIME: CPT | Performed by: PHYSICIAN ASSISTANT

## 2024-05-24 PROCEDURE — 80048 BASIC METABOLIC PNL TOTAL CA: CPT | Performed by: NURSE PRACTITIONER

## 2024-05-24 PROCEDURE — 99222 1ST HOSP IP/OBS MODERATE 55: CPT | Performed by: INTERNAL MEDICINE

## 2024-05-24 PROCEDURE — 85027 COMPLETE CBC AUTOMATED: CPT | Performed by: NURSE PRACTITIONER

## 2024-05-24 PROCEDURE — 85018 HEMOGLOBIN: CPT | Performed by: PHYSICIAN ASSISTANT

## 2024-05-24 PROCEDURE — 97530 THERAPEUTIC ACTIVITIES: CPT

## 2024-05-24 PROCEDURE — 99233 SBSQ HOSP IP/OBS HIGH 50: CPT | Performed by: SURGERY

## 2024-05-24 PROCEDURE — 84100 ASSAY OF PHOSPHORUS: CPT | Performed by: NURSE PRACTITIONER

## 2024-05-24 PROCEDURE — 99222 1ST HOSP IP/OBS MODERATE 55: CPT | Performed by: PSYCHIATRY & NEUROLOGY

## 2024-05-24 PROCEDURE — 97116 GAIT TRAINING THERAPY: CPT

## 2024-05-24 PROCEDURE — 85014 HEMATOCRIT: CPT | Performed by: PHYSICIAN ASSISTANT

## 2024-05-24 PROCEDURE — 83735 ASSAY OF MAGNESIUM: CPT | Performed by: NURSE PRACTITIONER

## 2024-05-24 PROCEDURE — 82948 REAGENT STRIP/BLOOD GLUCOSE: CPT

## 2024-05-24 RX ORDER — LEVETIRACETAM 500 MG/1
1000 TABLET ORAL EVERY EVENING
Status: DISCONTINUED | OUTPATIENT
Start: 2024-05-24 | End: 2024-05-28 | Stop reason: HOSPADM

## 2024-05-24 RX ORDER — LEVETIRACETAM 500 MG/1
500 TABLET ORAL EVERY MORNING
Status: DISCONTINUED | OUTPATIENT
Start: 2024-05-25 | End: 2024-05-28 | Stop reason: HOSPADM

## 2024-05-24 RX ORDER — MAGNESIUM SULFATE HEPTAHYDRATE 40 MG/ML
2 INJECTION, SOLUTION INTRAVENOUS ONCE
Status: COMPLETED | OUTPATIENT
Start: 2024-05-24 | End: 2024-05-24

## 2024-05-24 RX ORDER — HYDROCORTISONE 25 MG/G
CREAM TOPICAL 4 TIMES DAILY PRN
Status: DISCONTINUED | OUTPATIENT
Start: 2024-05-24 | End: 2024-05-28 | Stop reason: HOSPADM

## 2024-05-24 RX ORDER — LORAZEPAM 1 MG/1
2 TABLET ORAL ONCE
Status: COMPLETED | OUTPATIENT
Start: 2024-05-24 | End: 2024-05-24

## 2024-05-24 RX ORDER — LORAZEPAM 0.5 MG/1
0.25 TABLET ORAL 3 TIMES DAILY PRN
Status: DISCONTINUED | OUTPATIENT
Start: 2024-05-24 | End: 2024-05-26

## 2024-05-24 RX ADMIN — LORAZEPAM 0.25 MG: 0.5 TABLET ORAL at 17:03

## 2024-05-24 RX ADMIN — Medication 2000 UNITS: at 10:52

## 2024-05-24 RX ADMIN — MAGNESIUM SULFATE HEPTAHYDRATE 2 G: 40 INJECTION, SOLUTION INTRAVENOUS at 10:52

## 2024-05-24 RX ADMIN — SODIUM PHOSPHATE, MONOBASIC, MONOHYDRATE AND SODIUM PHOSPHATE, DIBASIC, ANHYDROUS 12 MMOL: 142; 276 INJECTION, SOLUTION INTRAVENOUS at 13:58

## 2024-05-24 RX ADMIN — LEVETIRACETAM 1000 MG: 500 TABLET, FILM COATED ORAL at 17:03

## 2024-05-24 RX ADMIN — FENOFIBRATE 145 MG: 145 TABLET ORAL at 08:12

## 2024-05-24 RX ADMIN — CHLORHEXIDINE GLUCONATE 15 ML: 1.2 SOLUTION ORAL at 21:53

## 2024-05-24 RX ADMIN — LEVETIRACETAM 1000 MG: 500 TABLET, FILM COATED ORAL at 08:13

## 2024-05-24 RX ADMIN — WARFARIN SODIUM 3.5 MG: 2.5 TABLET ORAL at 17:03

## 2024-05-24 RX ADMIN — ENOXAPARIN SODIUM 30 MG: 30 INJECTION SUBCUTANEOUS at 08:12

## 2024-05-24 RX ADMIN — ACETAMINOPHEN 650 MG: 325 TABLET, FILM COATED ORAL at 06:12

## 2024-05-24 RX ADMIN — FOLIC ACID 1 MG: 1 TABLET ORAL at 08:13

## 2024-05-24 RX ADMIN — Medication 1 TABLET: at 08:12

## 2024-05-24 RX ADMIN — PHENOBARBITAL SODIUM 500 MG: 65 INJECTION INTRAMUSCULAR at 21:53

## 2024-05-24 RX ADMIN — METOPROLOL TARTRATE 25 MG: 25 TABLET, FILM COATED ORAL at 21:53

## 2024-05-24 RX ADMIN — METOPROLOL TARTRATE 25 MG: 25 TABLET, FILM COATED ORAL at 10:52

## 2024-05-24 RX ADMIN — CHLORHEXIDINE GLUCONATE 15 ML: 1.2 SOLUTION ORAL at 08:12

## 2024-05-24 RX ADMIN — THIAMINE HCL TAB 100 MG 100 MG: 100 TAB at 08:13

## 2024-05-24 RX ADMIN — ATORVASTATIN CALCIUM 10 MG: 10 TABLET, FILM COATED ORAL at 17:03

## 2024-05-24 RX ADMIN — INSULIN LISPRO 1 UNITS: 100 INJECTION, SOLUTION INTRAVENOUS; SUBCUTANEOUS at 17:03

## 2024-05-24 RX ADMIN — LORAZEPAM 2 MG: 1 TABLET ORAL at 08:12

## 2024-05-24 RX ADMIN — INSULIN LISPRO 1 UNITS: 100 INJECTION, SOLUTION INTRAVENOUS; SUBCUTANEOUS at 21:59

## 2024-05-24 RX ADMIN — ENOXAPARIN SODIUM 30 MG: 30 INJECTION SUBCUTANEOUS at 21:52

## 2024-05-24 NOTE — ASSESSMENT & PLAN NOTE
- resume home metoprolol at 25 mg BID dosing of metoprolol tartrate (takes metoprolol succinate 50 mg once daily at home)  - plan to resume lisinopril 5 mg daily if BP tolerates, possibly on 5/25

## 2024-05-24 NOTE — ASSESSMENT & PLAN NOTE
- history of chronic alcohol abuse, drinking 10 beers per week on average  - positive CIWA scores this admission. Received phenobarbital in ICU. Receiving IV benzodiazepines per CIWA protocol on the floor, most recent dose 2 mg PO ativan 5/24 AM.   - continue thiamine, folic acid, MVD -

## 2024-05-24 NOTE — ASSESSMENT & PLAN NOTE
- s/p IR embolization on 5/22 with no active bleeding seen in the right thigh, but 2 proximal profunda branches supplying the region of the hematoma embolized with Gelfoam.   - warfarin held and reversed with K-Centra and Vitamin K  - Hgb stabilized at 9.6. He did not require blood transfusion.   - INR on 5/24 is 1.4. Continue Lovenox for DVT ppx and resume Warfarin at home dose.   - dressing change daily to right thigh abrasion  - right thigh is soft, NVI distally  - no signs of ongoing bleeding

## 2024-05-24 NOTE — ASSESSMENT & PLAN NOTE
- h/o DVT/PE and stroke  - Coumadin held/reversed on 5/22 with Vitamin K/K-Centra due to thigh hematoma with active extravasation now s/p IR embolization  - resume Coumadin 5/24. Hgb stable at 9.6 and INR 1.4. Will continue lovenox pending INR 2.0-3.0.

## 2024-05-24 NOTE — PROGRESS NOTES
[facility]  Progress Note  Name: Brian Lal I  MRN: [id number]  [facility] Date of Admission: 5/22/2024   Date of Service: 5/24/2024 I Hospital Day: 2    Assessment & Plan   Fall  Assessment & Plan  - fall at home due to loss of balance  - PT/OT evaluations  - CM for dispo planning    * Hematoma of right thigh  Assessment & Plan  - s/p IR embolization on 5/22 with no active bleeding seen in the right thigh, but 2 proximal profunda branches supplying the region of the hematoma embolized with Gelfoam.   - warfarin held and reversed with K-Centra and Vitamin K  - Hgb stabilized at 9.6. He did not require blood transfusion.   - INR on 5/24 is 1.4. Continue Lovenox for DVT ppx and resume Warfarin at home dose.   - dressing change daily to right thigh abrasion  - right thigh is soft, NVI distally  - no signs of ongoing bleeding    Acute pain due to trauma  Assessment & Plan  - Acute pain secondary to traumatic injuries.  - Continue multimodal analgesic regimen.  - Bowel regimen as long as using opioids.  - Continue to monitor pain and adjust regimen as indicated.      Acute blood loss anemia  Assessment & Plan  - Hgb stable at 9.6  - no active bleeding clinically  - s/p IR embolization of R thigh hematoma which is the source of bleeding  - no need for blood transfusion  - resume coumadin 5/24. Repeat Hgb in AM 5/25.     Hypertension  Assessment & Plan  - resume home metoprolol at 25 mg BID dosing of metoprolol tartrate (takes metoprolol succinate 50 mg once daily at home)  - plan to resume lisinopril 5 mg daily if BP tolerates, possibly on 5/25    History of anxiety  Assessment & Plan  - takes Xanax 0.25 mg TID prn at home  - currently ordered, however he is on CIWA protocol and receiving benzodiazepines per protocol as well for intermittent withdrawal symptoms    History of depressive symptoms  Assessment & Plan  - family requesting psychiatric consultation- consult  "placed, will f/u recommendations    Alcohol withdrawal (HCC)  Assessment & Plan  - history of chronic alcohol abuse, drinking 10 beers per week on average  - positive CIWA scores this admission. Received phenobarbital in ICU. Receiving IV benzodiazepines per CIWA protocol on the floor, most recent dose 2 mg PO ativan 5/24 AM.   - continue thiamine, folic acid, MVD -     Chronic alcohol abuse  Assessment & Plan  - history of chronic alcohol abuse, drinking 10 beers per week on average  - positive CIWA scores this admission. Received phenobarbital in ICU. Receiving IV benzodiazepines per CIWA protocol on the floor, most recent dose 2 mg PO ativan 5/24 AM.   - continue thiamine, folic acid, MVD   - CM for SBIRT evaluation    Factor V Leiden (HCC)  Assessment & Plan  - h/o DVT/PE and stroke  - Coumadin held/reversed on 5/22 with Vitamin K/K-Centra due to thigh hematoma with active extravasation now s/p IR embolization  - resume Coumadin 5/24. Hgb stable at 9.6 and INR 1.4. Will continue lovenox pending INR 2.0-3.0.              Bowel Regimen: miralax, senokot  VTE Prophylaxis:Sequential compression device (Venodyne)  and Enoxaparin (Lovenox) ; Resume warfarin 5/24    Disposition: continue med-surg status, PT/OT re-evaluations, resumption of coumadin, Psych consult     Subjective   Chief Complaint: \"I'm feeling okay\"    Subjective: Patient is pleasant. He notes pain in the right thigh which is tolerable. He has difficulty moving due to the right thigh hematoma. Family said he had a very hard time getting to and from the bathroom this morning. He did have some anxiety this morning and tremors and was given ativan per CIWA protocol. Family has concerns with him coming home due to fall risk. They also would like psych evaluation due to concerns for depression.      Objective   Vitals:   Temp:  [97.8 °F (36.6 °C)-98.4 °F (36.9 °C)] 97.8 °F (36.6 °C)  HR:  [41-82] 82  Resp:  [18-22] 18  BP: (102-146)/(45-70) 133/70    I/O  "        05/22 0701  05/23 0700 05/23 0701  05/24 0700 05/24 0701  05/25 0700    P.O.  1080     I.V. (mL/kg) 3098.3 683.3 (6.8)     Blood 350      IV Piggyback 1350      Total Intake(mL/kg) 4798.3 1763.3 (17.6)     Urine (mL/kg/hr) 5200 2725 (1.1)     Total Output 5200 2725     Net -401.7 -961.7            Unmeasured Urine Occurrence  1 x     Unmeasured Stool Occurrence   1 x             Physical Exam:   GENERAL APPEARANCE: NAD  NEURO: GCS 15,non-focal  HEENT: NCAT  CV: RRR, no MGR  LUNGS: CTA bilaterally  GI: soft,non-tender,non-distended  : voiding  MSK: + R thigh hematoma/ecchymosis/swelling, NVI distally in RLE; + R thigh abrasion C/D/I with dressing in place,  SKIN: pink, warm, dry    Invasive Devices       Peripheral Intravenous Line  Duration             Peripheral IV 05/22/24 Distal;Left;Ventral (anterior) Forearm 2 days    Peripheral IV 05/22/24 Right;Upper Arm 1 day                          Lab Results: Results: I have personally reviewed all pertinent laboratory/tests results, BMP/CMP:   Lab Results   Component Value Date    SODIUM 139 05/24/2024    K 4.3 05/24/2024     05/24/2024    CO2 25 05/24/2024    BUN 13 05/24/2024    CREATININE 1.13 05/24/2024    CALCIUM 8.0 (L) 05/24/2024    EGFR 65 05/24/2024   , and CBC:   Lab Results   Component Value Date    WBC 7.31 05/24/2024    HGB 9.6 (L) 05/24/2024    HCT 29.7 (L) 05/24/2024    MCV 92 05/24/2024     (L) 05/24/2024    RBC 3.10 (L) 05/24/2024    MCH 29.7 05/24/2024    MCHC 32.2 05/24/2024    RDW 13.6 05/24/2024    MPV 11.6 05/24/2024     Imaging: I have personally reviewed pertinent reports.     Other Studies: no new

## 2024-05-24 NOTE — ASSESSMENT & PLAN NOTE
- history of chronic alcohol abuse, drinking 10 beers per week on average  - positive CIWA scores this admission. Received phenobarbital in ICU. Receiving IV benzodiazepines per CIWA protocol on the floor, most recent dose 2 mg PO ativan 5/24 AM.   - continue thiamine, folic acid, MVD   - CM for SBIRT evaluation

## 2024-05-24 NOTE — PHYSICAL THERAPY NOTE
Physical Therapy Progress Note     05/24/24 1400   PT Last Visit   PT Visit Date 05/24/24   Note Type   Note Type Treatment   Pain Assessment   Pain Assessment Tool 0-10   Pain Score 3   Pain Location/Orientation Orientation: Right;Location: Leg   Hospital Pain Intervention(s) Repositioned;Ambulation/increased activity;Elevated;Rest   Restrictions/Precautions   Other Precautions Chair Alarm;Bed Alarm;Fall Risk;Cognitive;Pain   Subjective   Subjective Pt encountered supine in bed, pleasant and agreeable to treatment.  requires some repetition to perform tasks as expected along with delayed response at times.  pt's daughter present & reports he has L neglect & visual deficits since onset of chronic stroke.   Bed Mobility   Supine to Sit 5  Supervision   Additional items Assist x 1;Bedrails;Increased time required;Verbal cues   Transfers   Sit to Stand 4  Minimal assistance   Additional items Assist x 1;Armrests;Increased time required   Stand to Sit 4  Minimal assistance   Additional items Assist x 1;Armrests;Increased time required   Ambulation/Elevation   Gait pattern Short stride;Foward flexed;Inconsistent toi;Shuffling;Decreased foot clearance;Ataxia;Improper Weight shift;Poor UE support   Gait Assistance 3  Moderate assist   Additional items Assist x 1   Assistive Device Rolling walker   Distance 50', 70' x 2, 80'   Stair Management Assistance 4  Minimal assist   Additional items Assist x 1   Stair Management Technique One rail R;Foreward;Alternating pattern;Two rails   Number of Stairs 3   Balance   Static Sitting Fair +   Static Standing Fair   Ambulatory Poor   Activity Tolerance   Activity Tolerance Patient tolerated treatment well;Patient limited by fatigue;Patient limited by pain   Nurse Made Aware yes   Assessment   Prognosis Good   Problem List Decreased strength;Decreased range of motion;Decreased endurance;Impaired balance;Decreased mobility;Decreased coordination;Decreased cognition;Impaired  judgement;Decreased safety awareness;Pain   Assessment Pt continues to require Ax1 for all OOB tasks at this time due to balance, srength, endurance deficits paired with general lack of environmental awareness & impaired motor planning when executing more simple & complicated turns in both directions.  pt demonstrates poor RW management & obstacle avoidance, primarily towards L side, but also towards R on occasion, requiring instructions to avoid obstacles & some assist for RW management as well.  pt negotaites steps with inconsistant technique, L side neglect for use of rail on that side, and even attempted to descend sideways when he felt this PTA was obstructing his path without attempting to discuss the same prior to descending steps.  All of this is to say pt is well below baseline indpendence which present family is in agreement at this time.  Discussion held with pt & family post activity regarding the same, role of rehab & d/c planning.  PT will change it's recommendations at this time to better reflect pt's present needs for services at d/c to ensure safe return to PLOF.  Discussion held with evaluating PT, Corie Olivas DPT who is in agreeement to the same.   Goals   Patient Goals to go home   STG Expiration Date 06/04/24   PT Treatment Day 1   Plan   Treatment/Interventions Functional transfer training;LE strengthening/ROM;Elevations;Therapeutic exercise;Endurance training;Patient/family training;Equipment eval/education;Bed mobility;Gait training   Progress Progressing toward goals   PT Frequency 3-5x/wk   Discharge Recommendation   Rehab Resource Intensity Level, PT II (Moderate Resource Intensity)   Equipment Recommended Walker   Walker Package Recommended Wheeled walker   AM-PAC Basic Mobility Inpatient   Turning in Flat Bed Without Bedrails 4   Lying on Back to Sitting on Edge of Flat Bed Without Bedrails 3   Moving Bed to Chair 3   Standing Up From Chair Using Arms 3   Walk in Room 2   Climb 3-5  Stairs With Railing 2   Basic Mobility Inpatient Raw Score 17   Basic Mobility Standardized Score 39.67   [facility] Highest Level Of Mobility   -HLM Goal 5: Stand one or more mins   -HLM Achieved 8: Walk 250 feet ot more       Burt Woodward PTA    An [facility] Basic Mobility Raw Score less than 17 suggests pt would benefit from post acute rehab.  Please also refer to the recommendation of the Physical Therapist for safe discharge planning.

## 2024-05-24 NOTE — PLAN OF CARE
Problem: PHYSICAL THERAPY ADULT  Goal: Performs mobility at highest level of function for planned discharge setting.  See evaluation for individualized goals.  Description: Treatment/Interventions: OT, Spoke to case management, Spoke to nursing, Gait training, Bed mobility, Patient/family training, Endurance training, LE strengthening/ROM, Functional transfer training          See flowsheet documentation for full assessment, interventions and recommendations.  Outcome: Progressing  Note: Prognosis: Good  Problem List: Decreased strength, Decreased range of motion, Decreased endurance, Impaired balance, Decreased mobility, Decreased coordination, Decreased cognition, Impaired judgement, Decreased safety awareness, Pain  Assessment: Pt continues to require Ax1 for all OOB tasks at this time due to balance, srength, endurance deficits paired with general lack of environmental awareness & impaired motor planning when executing more simple & complicated turns in both directions.  pt demonstrates poor RW management & obstacle avoidance, primarily towards L side, but also towards R on occasion, requiring instructions to avoid obstacles & some assist for RW management as well.  pt negotaites steps with inconsistant technique, L side neglect for use of rail on that side, and even attempted to descend sideways when he felt this PTA was obstructing his path without attempting to discuss the same prior to descending steps.  All of this is to say pt is well below baseline indpendence which present family is in agreement at this time.  Discussion held with pt & family post activity regarding the same, role of rehab & d/c planning.  PT will change it's recommendations at this time to better reflect pt's present needs for services at d/c to ensure safe return to PLOF.  Discussion held with evaluating PT, Corie Olivas DPT who is in agreeement to the same.  Barriers to Discharge: Inaccessible home environment     Rehab  Resource Intensity Level, PT: II (Moderate Resource Intensity)    See flowsheet documentation for full assessment.

## 2024-05-24 NOTE — ASSESSMENT & PLAN NOTE
- takes Xanax 0.25 mg TID prn at home  - currently ordered, however he is on CIWA protocol and receiving benzodiazepines per protocol as well for intermittent withdrawal symptoms

## 2024-05-24 NOTE — CONSULTS
Consultation - Behavioral Health   Brian Lal 68 y.o. male MRN: [id number]  Unit/Bed#: [facility] 602-01 Encounter: [id number]      Chief Complaint: I do not feel that I am depressed    History of Present Illness   Physician Requesting Consult: Macario Nogueira MD  Reason for Consult / Principal Problem: Family displays concern regarding longstanding depression and wishes for evaluation diagnosis alcohol abuse    Brian Lal is a 68 y.o. male with a history of a stroke, hypertension, seizures, factor V deficiency, hyperlipidemia, DVT, and diabetes presents with altered mental status.  According to the record he was drinking and he has a fall patient states that he was cleaning the pool and he felt. He  stated that he was no drinking that date but the wife says something else.  He has a history of a stroke, and apparently after that he lost the sight in the left side, he depend of his wife for going out, he has not been able to be independent any longer and he states that feel bore and for that reason he drinks.  He does minimize the alcohol intake but at the same time he states that he will need to drink some beers to prevent having some withdrawal symptoms.  He never has been in rehabilitation in the past.  He has getting lorazepam from the primary physician for anxiety but he only uses as needed.  Wife and children's are concerned that he is depressed.  He does state that he does not feel depressed, he also does not feel that alcohol is an issue.  He  stated that he passed most of the time alone in the house because the wife continue her life.  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  At one point patient was not drinking for 7 years.  He denies any suicidal or homicidal thoughts plan or intent he does not have any psychotic symptoms. .       Psychiatric Review Of Systems:  sleep: no  appetite changes: no  weight changes: no  energy/anergy: no  interest/pleasure/anhedonia: no  somatic  symptoms: no  anxiety/panic: no  yancy: no  guilty/hopeless: no  self injurious behavior/risky behavior: no    Historical Information   Past Psychiatric History:   None  Currently in treatment with primary physician.  Past Suicide attempts: None  Past Violent behavior: None   Past Psychiatric medication trial: Lorazepam    Substance Abuse History:  He has a longstanding history of alcohol use, he denies any drug use.  1 DUI in the past     I have assessed this patient for substance use within the past 12 months     History of IP/OP rehabilitation program: None  Smoking history: Never smoker  Family Psychiatric History:   He denies family history of mental illness, substance abuse or suicide attempt    Social History  Education: college graduate  Learning Disabilities:  None  Marital history:   Living arrangement, social support:  He lives with his wife.  Occupational History: retired  Functioning Relationships: good support system.  Other Pertinent History:  No  history, 1 DUI    Traumatic History:   Abuse:  None  Other Traumatic Events:  None    Past Medical History:   Diagnosis Date    Diabetes (HCC)     Diabetes mellitus (HCC)     DVT (deep venous thrombosis) ([facility]) 2016    Dyslipidemia     Factor V deficiency (HCC)     Hypertension     Lobar cerebral hemorrhage (HCC)     Multiple pulmonary emboli (HCC)     Seizures ([facility]) 07/2017    Stroke ([facility]) 12/14/2016       Medical Review Of Systems:  Review of Systems - Negative except anxious, all other systems were reviewed and are negative    Meds/Allergies   all current active meds have been reviewed  Allergies   Allergen Reactions    Penicillins Hives     hives, swelling- from youth         Objective   Vital signs in last 24 hours:  Temp:  [97.8 °F (36.6 °C)-98.4 °F (36.9 °C)] 97.8 °F (36.6 °C)  HR:  [41-82] 82  Resp:  [18-22] 18  BP: (106-146)/(45-70) 133/70      Intake/Output Summary (Last 24 hours) at 5/24/2024 4947  Last data filed at 5/24/2024  1332  Gross per 24 hour   Intake 1242 ml   Output 1325 ml   Net -83 ml       Mental Status Evaluation:  Appearance:  age appropriate and disheveled   Behavior:  normal   Speech:  normal pitch and normal volume   Mood:  anxious   Affect:  mood-congruent   Language: naming objects and repeating phrases   Thought Process:  goal directed   Associations: intact associations   Thought Content:  normal   Perceptual Disturbances: None   Risk Potential: Suicidal Ideations none, Homicidal Ideations none, and Potential for Aggression No   Sensorium:  person, place, time/date, and situation   Memory:  recent and remote memory grossly intact   Cognition:  recent and remote memory grossly intact   Consciousness:  alert and awake    Attention: attention span and concentration were age appropriate   Intellect: within normal limits   Fund of Knowledge: awareness of current events: fair, past history: fair, and vocabulary: fair   Insight:  limited   Judgment: limited   Muscle Strength and Tone: Within normal limits   Gait/Station: Unable to assess patient is in a chair   Motor Activity: no abnormal movements     Lab Results:  I have personally reviewed all pertinent laboratory/tests results.  Labs in last 72 hours:   Recent Labs     05/22/24  0749 05/22/24  1137 05/22/24  1515 05/22/24  2018 05/24/24  0448 05/24/24  1205   WBC 9.75  --  6.72  --  7.31  --    RBC 3.60*  --  3.54*  --  3.10*  --    HGB 10.6*   < > 10.6*   < > 9.2* 9.6*   HCT 31.7*   < > 31.4*   < > 28.6* 29.7*     --  199  --  143*  --    RDW 13.2  --  13.2  --  13.6  --    NEUTROABS 7.35  --  4.66  --   --   --    SODIUM 127*  --  135   < > 139  --    K 4.3  --  4.2   < > 4.3  --    CL 99  --  107   < > 106  --    CO2 17*   < > 21   < > 25  --    BUN 23  --  18   < > 13  --    CREATININE 1.50*  --  1.19   < > 1.13  --    GLUC 138  --  73   < > 97  --    CALCIUM 8.1*  --  8.3*   < > 8.0*  --    AST 17  --  14  --   --   --    ALT 14  --  13  --   --   --     ALKPHOS 28*  --  24*  --   --   --    TP 5.3*  --  5.4*  --   --   --    ALB 3.2*  --  3.4*  --   --   --    TBILI 0.71  --  1.53*  --   --   --    AMMONIA 24  --   --   --   --   --    [id number] 0.957  --   --   --   --   --     < > = values in this interval not displayed.       Code Status: )Level 1 - Full Code    Assessment & Plan     Assessment:  Brian Lal is a 68 y.o. male with a history of a stroke, hypertension, seizures, factor V deficiency, hyperlipidemia, DVT, and diabetes presents with altered mental status.  He has been evaluated for depression as a request by the family.  Patient stated that he does not feel depressed, he does not feel also the alcohol is an issue.  He denies any suicidal missile thoughts plan or intent he does not have any psychotic symptoms.  He does agree to check AA meetings through the Internet and also individual therapy via telemedicine  Diagnosis:  Alcohol abuse  Adjustment disorder with anxious mood  Plan:   Continue medical management  Recommended AA meetings  Individual therapy, as tolerated will contact insurance and investigate about telemedicine  This case has been discussed with the primary team  No intervention at this time I will sign off  Risks, benefits and possible side effects of Medications:   No medication recommended at this time      Beryl Becerril MD

## 2024-05-24 NOTE — CASE MANAGEMENT
Case Management Discharge Planning Note    Patient name Brian Lal  [facility] MRN [id number]  : 1955 Date 2024       Current Admission Date: 2024  Current Admission Diagnosis:Hematoma of right thigh   Patient Active Problem List    Diagnosis Date Noted Date Diagnosed    Hematoma of right thigh 2024     History of prostate cancer 2021     Renal cyst 2021     Sleep disturbance 2020     Prostate CA (HCC) 2018     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 localized onset, not intractable, without status epilepticus (HCC) 2018     History of cerebral hemorrhage 2018     Elevated PSA 2018       LOS (days): 2  Geometric Mean LOS (GMLOS) (days): 2.5  Days to GMLOS:0.4     OBJECTIVE:  Risk of Unplanned Readmission Score: 20.82         Current admission status: Inpatient   Preferred Pharmacy:   [facility]/pharmacy #[id number] - [place] - [address]  [address]  [place] [address]  Phone: [phone] Fax: [phone]    [facility] [address] [place] - [address]  [address]  [facility] [facility] [address]  Phone: [phone] Fax: [phone]    Primary Care Provider: Douglas Charles Shoenberger, MD    Primary Insurance: MEDICARE  Secondary Insurance: [facility]    DISCHARGE DETAILS:    Met with ganesh Santacruz.  She has concerns with pt DC to home with [facility].  Feels he is unsteady, needs to be able to do stairs, wife is 24/7 caregiver, lizhtadriana do not live in the area to assist.  Wants to ensure a safe DC.  Advised we will have therapy continue to work with pt and have him evaluated for safe DCP    Discussed services that may be available to assist pt and wife.  Provided information for [facility].      PT rec for rehab.  Spoke with pt and wife.    A post acute care recommendation was made by your care team for Acute  Rehab.  Discussed Freedom of Choice with patient.  Choice is to make a new referral.    Referral [facility] via [facility]

## 2024-05-24 NOTE — ASSESSMENT & PLAN NOTE
- Hgb stable at 9.6  - no active bleeding clinically  - s/p IR embolization of R thigh hematoma which is the source of bleeding  - no need for blood transfusion  - resume coumadin 5/24. Repeat Hgb in AM 5/25.

## 2024-05-24 NOTE — H&P
H&P - Trauma   Brian Lal 68 y.o. male MRN: [id number]  Unit/Bed#: [facility] 602-01 Encounter: [id number]    Trauma Alert: Evaluation; trauma team notified at 1000 via text   Model of Arrival: Self    Trauma Team: Jero Kelley  Consultants:     Other: {IR - STAT consult; returned call/text yes  ;     Assessment & Plan   Active Problems / Assessment:   Fall from ground level  EtOH abuse  Acquired coaguloapthy 2/2 warfarin use (INR 4.57)  Right thigh hematoma with active extravasation     Plan:   Admission  IR consult for embolization  Reversal with Vit K  CIWA protocol  Q6H H&H  Consider transfusion as needed    History of Present Illness     Chief Complaint: FAll  Mechanism:Fall     HPI:    Brian Lal is a 68 y.o. male who presents with right thigh hematoma with active extravasation secondary to a fall yesterday.  He states that he was drinking and that he was leaning over by the pool when he fell striking his thigh on the edge of the pool falling into the pool.  He states that he does remember this.  He states that he did not strike his head did not lose consciousness.  He originally presented for AMS with systolics in the 70s and got a fluid bolus but then was systolics in the 90s.  He states that he drinks 5+ beers a day.  He says that he was a recovering EtOH user but since his relapse.  He states that he takes Coumadin and Ativan has been taking it as scheduled.  He states that he also has factor V Leiden deficiency.  Patient currently denies any headache, dizziness, chest pain, shortness of breath, N/V/D, abdominal pain, motor or sensory deficits.  Complains only of pain on the right lateral thigh.  He states that he has been getting more black and blue and tense.  He presents with his wife who states that it is looking worse today..  Emergency department given bolus fluids for a blood pressure of 95/52.  As well as [employer] since his INR was 4.5 with signs of active extravasation.    Review of Systems    Constitutional:         See hpi   All other systems reviewed and are negative.    12-point, complete review of systems was reviewed and negative except as stated above.     Historical Information     Past Medical History:   Diagnosis Date    Diabetes (HCC)     Diabetes mellitus (HCC)     DVT (deep venous thrombosis) (HCC)     Dyslipidemia     Factor V deficiency (HCC)     Hypertension     Lobar cerebral hemorrhage (HCC)     Multiple pulmonary emboli (HCC)     Seizures (HCC) 2017    Stroke (HCC) 2016     Past Surgical History:   Procedure Laterality Date    IVC FILTER INSERTION      IVC umbrella    PROSTATE BIOPSY Bilateral 2018    Dr. Riley         Social History     Tobacco Use    Smoking status: Former     Current packs/day: 0.00     Types: Cigarettes     Quit date: 1989     Years since quittin.4    Smokeless tobacco: Never   Substance Use Topics    Alcohol use: Yes     Comment: 10 beer/week    Drug use: No     Immunization History   Administered Date(s) Administered    COVID-19 MODERNA VACC 0.5 ML IM 2021, 2021, 10/25/2021    COVID-19 Moderna Vac BIVALENT 12 Yr+ IM 0.5 ML 2022    Influenza LAIV (Nasal) 2017    Pneumococcal 2016     Last Tetanus: Pt states has had in the last 10yrs  Family History: Non-contributory    1. Before the illness or injury that brought you to the Emergency, did you need someone to help you on a regular basis? 0=No   2. Since the illness or injury that brought you to the Emergency, have you needed more help than usual to take care of yourself? 0=No   3. Have you been hospitalized for one or more nights during the past 6 months (excluding a stay in the Emergency Department)? 0=No   4. In general, do you see well? 0=Yes   5. In general, do you have serious problems with your memory? 0=No   6. Do you take more than three different medications everyday? 1=Yes   TOTAL   1     Did you order a geriatric consult if the score was 2 or  greater?: no     Meds/Allergies   all current active meds have been reviewed, current meds:   Current Facility-Administered Medications   Medication Dose Route Frequency    acetaminophen (TYLENOL) tablet 650 mg  650 mg Oral Q6H PRN    atorvastatin (LIPITOR) tablet 10 mg  10 mg Oral Daily With Dinner    chlorhexidine (PERIDEX) 0.12 % oral rinse 15 mL  15 mL Mouth/Throat Q12H [facility]    Cholecalciferol (VITAMIN D3) tablet 2,000 Units  2,000 Units Oral Daily    [START ON 5/27/2024] cyanocobalamin (VITAMIN B-12) tablet 1,000 mcg  1,000 mcg Oral Once per day on Monday Thursday    cyanocobalamin (VITAMIN B-12) tablet 100 mcg  100 mcg Oral Daily    enoxaparin (LOVENOX) subcutaneous injection 30 mg  30 mg Subcutaneous Q12H [facility]    fenofibrate (TRICOR) tablet 145 mg  145 mg Oral Daily    folic acid (FOLVITE) tablet 1 mg  1 mg Oral Daily    hydrocortisone (ANUSOL-HC) 2.5 % rectal cream   Topical 4x Daily PRN    insulin lispro (HumALOG/ADMELOG) 100 units/mL subcutaneous injection 1-5 Units  1-5 Units Subcutaneous HS    insulin lispro (HumALOG/ADMELOG) 100 units/mL subcutaneous injection 1-6 Units  1-6 Units Subcutaneous TID AC    levETIRAcetam (KEPPRA) tablet 1,000 mg  1,000 mg Oral QPM    [START ON 5/25/2024] levETIRAcetam (KEPPRA) tablet 500 mg  500 mg Oral QAM    LORazepam (ATIVAN) tablet 0.25 mg  0.25 mg Oral TID PRN    metoprolol tartrate (LOPRESSOR) tablet 25 mg  25 mg Oral Q12H [facility]    multivitamin-minerals (CENTRUM) tablet 1 tablet  1 tablet Oral Daily    naloxone (NARCAN) 0.04 mg/mL syringe 0.04 mg  0.04 mg Intravenous Q1MIN PRN    ondansetron (ZOFRAN) injection 4 mg  4 mg Intravenous Q4H PRN    oxyCODONE (ROXICODONE) split tablet 2.5 mg  2.5 mg Oral Q4H PRN    Or    oxyCODONE (ROXICODONE) IR tablet 5 mg  5 mg Oral Q4H PRN    polyethylene glycol (MIRALAX) packet 17 g  17 g Oral Daily    senna-docusate sodium (SENOKOT S) 8.6-50 mg per tablet 1 tablet  1 tablet Oral HS    thiamine tablet 100 mg  100 mg Oral Daily    warfarin  (COUMADIN) tablet 3.5 mg  3.5 mg Oral Daily (warfarin)   , and PTA meds:   Prior to Admission Medications   Prescriptions Last Dose Informant Patient Reported? Taking?   Cholecalciferol (VITAMIN D3) 1000 units CAPS  Spouse/Significant Other Yes No   Sig: i po daily   Cyanocobalamin (VITAMIN B-12 PO)  Spouse/Significant Other Yes No   Sig: Take 1,500 Units by mouth 3 (three) times a week    FLUoxetine (PROzac) 20 mg capsule  Spouse/Significant Other Yes No   Sig: TAKE 1 CAPSULE BY ORAL ROUTE EVERY DAY IN THE MORNING   Patient not taking: Reported on 11/18/2021   Jardiance 25 MG TABS   Yes No   Sig: Take 25 mg by mouth daily   LORazepam (ATIVAN) 0.5 mg tablet  Spouse/Significant Other Yes No   Sig: Take 0.5 mg by mouth if needed   aspirin 81 MG tablet  Spouse/Significant Other Yes No   Sig: Take 81 mg by mouth   atorvastatin (LIPITOR) 10 mg tablet  Spouse/Significant Other Yes No   Sig: Take 10 mg by mouth daily   cloNIDine (CATAPRES) 0.1 mg tablet  Spouse/Significant Other Yes No   Sig: Take 0.1 mg by mouth 2 (two) times a day   Patient not taking: Reported on 11/18/2021    fenofibrate (TRICOR) 54 MG tablet  Spouse/Significant Other Yes No   Sig: Take 160 mg by mouth daily    fenofibrate 160 MG tablet   Yes No   Sig: Take 160 mg by mouth daily   glipiZIDE (GLUCOTROL XL) 10 mg 24 hr tablet  Spouse/Significant Other Yes No   Sig: Take 10 mg by mouth daily with breakfast 10 mg in am, 5mg in PM   levETIRAcetam (KEPPRA) 1000 MG tablet   No No   Sig: By mouth take half tablet in the morning and 1 full tablet in the evening daily   lisinopril (ZESTRIL) 20 mg tablet  Spouse/Significant Other Yes No   Sig: Take 10 mg by mouth daily   lisinopril (ZESTRIL) 5 mg tablet   Yes No   Sig: Take 5 mg by mouth daily   metoprolol succinate (TOPROL-XL) 25 mg 24 hr tablet   Yes No   Sig: Take 25 mg by mouth daily   metoprolol succinate (TOPROL-XL) 50 mg 24 hr tablet   Yes No   Sig: Take 50 mg by mouth daily   repaglinide (PRANDIN) 1 mg  tablet  Spouse/Significant Other Yes No   Sig: TAKE 1 TABLET BY MOUTH 3 TIMES EVERY DAY 15 TO 30 MINUTES BEFORE MEALS   sildenafil (VIAGRA) 100 mg tablet  Spouse/Significant Other Yes No   Sig: As needed for ED.   Patient not taking: Reported on 11/18/2021   thiamine (VITAMIN B1) 100 mg tablet  Spouse/Significant Other Yes No   Sig: i po daily   warfarin (COUMADIN) 1 mg tablet  Spouse/Significant Other Yes No   Sig: take daily per warfarin protocol   Patient not taking: Reported on 11/18/2021   warfarin (COUMADIN) 4 mg tablet  Spouse/Significant Other Yes No   Sig: Take 4 mg by mouth 3.5   3 times a week, 4mg 4 x a week   warfarin (COUMADIN) 5 mg tablet  Spouse/Significant Other Yes No   Sig: Take 5 mg by mouth daily Everyday   Patient not taking: Reported on 11/18/2021       Facility-Administered Medications: None        Allergies   Allergen Reactions    Penicillins Hives     hives, swelling- from youth         Objective   Initial Vitals:   Temperature: 98.6 °F (37 °C) (05/22/24 0720)  Pulse: 83 (05/22/24 0720)  Respirations: 18 (05/22/24 0720)  Blood Pressure: 115/59 (05/22/24 0720)  Arterial Line BP: 108/48 (05/22/24 1330)    Primary Survey:   Airway:        Status: patent;        Pre-hospital Interventions: none        Hospital Interventions: none  Breathing:        Pre-hospital Interventions: none       Effort: normal       Right breath sounds: normal       Left breath sounds: normal  Circulation:        Rhythm: regular       Rate: regular   Right Pulses Left Pulses    R radial: 2+    R pedal: 2+     L radial: 2+    L pedal: 2+       Disability:        GCS: Eye: 4; Verbal: 5 Motor: 6 Total: 15       Right Pupil: 3 mm;  round;  reactive         Left Pupil:  3 mm;  round;  reactive      R Motor Strength L Motor Strength    R : 5/5  R dorsiflex: 5/5  R plantarflex: 5/5 L : 5/5  L dorsiflex: 5/5  L plantarflex: 5/5        Sensory:  No sensory deficit  Exposure:       Completed: Yes      Secondary  Survey:  Physical Exam  Vitals and nursing note reviewed.   Constitutional:       Appearance: Normal appearance.   HENT:      Head: Normocephalic and atraumatic.      Nose: Nose normal.      Mouth/Throat:      Mouth: Mucous membranes are moist.   Eyes:      Extraocular Movements: Extraocular movements intact.      Pupils: Pupils are equal, round, and reactive to light.   Cardiovascular:      Rate and Rhythm: Normal rate and regular rhythm.      Pulses: Normal pulses.      Heart sounds: Normal heart sounds.   Musculoskeletal:      Cervical back: Normal range of motion. No tenderness.   Neurological:      Mental Status: He is alert.         Invasive Devices       Peripheral Intravenous Line  Duration             Peripheral IV 05/22/24 Distal;Left;Ventral (anterior) Forearm 2 days    Peripheral IV 05/22/24 Right;Upper Arm 1 day                  Lab Results: I have personally reviewed all pertinent laboratory/test results 05/24/24 and in the preceding 24 hours.  Recent Labs     05/22/24  0749 05/22/24  0929 05/22/24  1137 05/22/24  1515 05/22/24  2018 05/24/24  0448 05/24/24  1205   WBC 9.75  --   --  6.72  --  7.31  --    HGB 10.6*  --  9.2* 10.6*   < > 9.2* 9.6*   HCT 31.7*  --  27* 31.4*   < > 28.6* 29.7*     --   --  199  --  143*  --    SODIUM 127*  --   --  135   < > 139  --    K 4.3  --   --  4.2   < > 4.3  --    CL 99  --   --  107   < > 106  --    CO2 17*  --  19* 21   < > 25  --    BUN 23  --   --  18   < > 13  --    CREATININE 1.50*  --   --  1.19   < > 1.13  --    GLUC 138  --   --  73   < > 97  --    CAIONIZED  --   --  1.23  --   --   --   --    MG  --   --   --  1.8*   < > 1.9  --    PHOS  --   --   --  3.5   < > 2.1*  --    AST 17  --   --  14  --   --   --    ALT 14  --   --  13  --   --   --    ALB 3.2*  --   --  3.4*  --   --   --    TBILI 0.71  --   --  1.53*  --   --   --    ALKPHOS 28*  --   --  24*  --   --   --    INR 4.57*  --   --  2.15*  --   --  1.41*   HSTNI0 3  --   --   --   --   --    --    HSTNI2  --  4  --   --   --   --   --     < > = values in this interval not displayed.       Imaging Results: I have personally reviewed pertinent images saved in PACS. CT scan findings (and other pertinent positive findings on images) were discussed with radiology. My interpretation of the images/reports are as follows:  Chest Xray(s): negative for acute findings   FAST exam(s): N/A   CT Scan(s): positive for acute findings: 13 cm hematoma of the posterior lateral proximal right thigh with 2 separate areas of active bleeding.,  Chronic right PCA territory infarct. (Infarct seen on MRI in 2017)   Additional Xray(s): N/A     Other Studies:     Code Status: Level 1 - Full Code  Advance Directive and Living Will:      Power of :    POLST:

## 2024-05-25 LAB
ANION GAP SERPL CALCULATED.3IONS-SCNC: 7 MMOL/L (ref 4–13)
BASOPHILS # BLD AUTO: 0.04 THOUSANDS/ÂΜL (ref 0–0.1)
BASOPHILS NFR BLD AUTO: 1 % (ref 0–1)
BUN SERPL-MCNC: 14 MG/DL (ref 5–25)
CALCIUM SERPL-MCNC: 8.5 MG/DL (ref 8.4–10.2)
CHLORIDE SERPL-SCNC: 107 MMOL/L (ref 96–108)
CO2 SERPL-SCNC: 24 MMOL/L (ref 21–32)
CREAT SERPL-MCNC: 1.07 MG/DL (ref 0.6–1.3)
EOSINOPHIL # BLD AUTO: 0.12 THOUSAND/ÂΜL (ref 0–0.61)
EOSINOPHIL NFR BLD AUTO: 2 % (ref 0–6)
ERYTHROCYTE [DISTWIDTH] IN BLOOD BY AUTOMATED COUNT: 13.7 % (ref 11.6–15.1)
GFR SERPL CREATININE-BSD FRML MDRD: 70 ML/MIN/1.73SQ M
GLUCOSE SERPL-MCNC: 127 MG/DL (ref 65–140)
GLUCOSE SERPL-MCNC: 129 MG/DL (ref 65–140)
GLUCOSE SERPL-MCNC: 130 MG/DL (ref 65–140)
GLUCOSE SERPL-MCNC: 137 MG/DL (ref 65–140)
GLUCOSE SERPL-MCNC: 171 MG/DL (ref 65–140)
HCT VFR BLD AUTO: 28.3 % (ref 36.5–49.3)
HGB BLD-MCNC: 9.1 G/DL (ref 12–17)
IMM GRANULOCYTES # BLD AUTO: 0.07 THOUSAND/UL (ref 0–0.2)
IMM GRANULOCYTES NFR BLD AUTO: 1 % (ref 0–2)
INR PPP: 1.18 (ref 0.84–1.19)
LYMPHOCYTES # BLD AUTO: 1.75 THOUSANDS/ÂΜL (ref 0.6–4.47)
LYMPHOCYTES NFR BLD AUTO: 24 % (ref 14–44)
MCH RBC QN AUTO: 29.7 PG (ref 26.8–34.3)
MCHC RBC AUTO-ENTMCNC: 32.2 G/DL (ref 31.4–37.4)
MCV RBC AUTO: 93 FL (ref 82–98)
MONOCYTES # BLD AUTO: 0.51 THOUSAND/ÂΜL (ref 0.17–1.22)
MONOCYTES NFR BLD AUTO: 7 % (ref 4–12)
NEUTROPHILS # BLD AUTO: 4.69 THOUSANDS/ÂΜL (ref 1.85–7.62)
NEUTS SEG NFR BLD AUTO: 65 % (ref 43–75)
NRBC BLD AUTO-RTO: 0 /100 WBCS
PLATELET # BLD AUTO: 194 THOUSANDS/UL (ref 149–390)
PMV BLD AUTO: 10.1 FL (ref 8.9–12.7)
POTASSIUM SERPL-SCNC: 4 MMOL/L (ref 3.5–5.3)
PROTHROMBIN TIME: 14.9 SECONDS (ref 11.6–14.5)
RBC # BLD AUTO: 3.06 MILLION/UL (ref 3.88–5.62)
SODIUM SERPL-SCNC: 138 MMOL/L (ref 135–147)
WBC # BLD AUTO: 7.18 THOUSAND/UL (ref 4.31–10.16)

## 2024-05-25 PROCEDURE — 80048 BASIC METABOLIC PNL TOTAL CA: CPT | Performed by: PHYSICIAN ASSISTANT

## 2024-05-25 PROCEDURE — 99233 SBSQ HOSP IP/OBS HIGH 50: CPT | Performed by: SURGERY

## 2024-05-25 PROCEDURE — 85610 PROTHROMBIN TIME: CPT | Performed by: PHYSICIAN ASSISTANT

## 2024-05-25 PROCEDURE — 82948 REAGENT STRIP/BLOOD GLUCOSE: CPT

## 2024-05-25 PROCEDURE — 97535 SELF CARE MNGMENT TRAINING: CPT

## 2024-05-25 PROCEDURE — 85025 COMPLETE CBC W/AUTO DIFF WBC: CPT | Performed by: PHYSICIAN ASSISTANT

## 2024-05-25 RX ORDER — ENOXAPARIN SODIUM 100 MG/ML
90 INJECTION SUBCUTANEOUS EVERY 12 HOURS SCHEDULED
Status: DISCONTINUED | OUTPATIENT
Start: 2024-05-25 | End: 2024-05-28 | Stop reason: HOSPADM

## 2024-05-25 RX ADMIN — POLYETHYLENE GLYCOL 3350 17 G: 17 POWDER, FOR SOLUTION ORAL at 08:30

## 2024-05-25 RX ADMIN — LEVETIRACETAM 1000 MG: 500 TABLET, FILM COATED ORAL at 17:08

## 2024-05-25 RX ADMIN — CHLORHEXIDINE GLUCONATE 15 ML: 1.2 SOLUTION ORAL at 21:14

## 2024-05-25 RX ADMIN — CHLORHEXIDINE GLUCONATE 15 ML: 1.2 SOLUTION ORAL at 08:30

## 2024-05-25 RX ADMIN — LORAZEPAM 0.25 MG: 0.5 TABLET ORAL at 10:49

## 2024-05-25 RX ADMIN — METOPROLOL TARTRATE 25 MG: 25 TABLET, FILM COATED ORAL at 21:17

## 2024-05-25 RX ADMIN — LORAZEPAM 0.25 MG: 0.5 TABLET ORAL at 17:08

## 2024-05-25 RX ADMIN — LORAZEPAM 0.25 MG: 0.5 TABLET ORAL at 21:16

## 2024-05-25 RX ADMIN — ATORVASTATIN CALCIUM 10 MG: 10 TABLET, FILM COATED ORAL at 17:08

## 2024-05-25 RX ADMIN — Medication 1 TABLET: at 08:29

## 2024-05-25 RX ADMIN — Medication 100 MCG: at 08:30

## 2024-05-25 RX ADMIN — Medication 2000 UNITS: at 08:30

## 2024-05-25 RX ADMIN — LEVETIRACETAM 500 MG: 500 TABLET, FILM COATED ORAL at 08:29

## 2024-05-25 RX ADMIN — ENOXAPARIN SODIUM 30 MG: 30 INJECTION SUBCUTANEOUS at 08:29

## 2024-05-25 RX ADMIN — INSULIN LISPRO 1 UNITS: 100 INJECTION, SOLUTION INTRAVENOUS; SUBCUTANEOUS at 12:15

## 2024-05-25 RX ADMIN — ACETAMINOPHEN 650 MG: 325 TABLET, FILM COATED ORAL at 23:36

## 2024-05-25 RX ADMIN — WARFARIN SODIUM 3.5 MG: 2.5 TABLET ORAL at 17:08

## 2024-05-25 RX ADMIN — ENOXAPARIN SODIUM 90 MG: 100 INJECTION SUBCUTANEOUS at 21:14

## 2024-05-25 RX ADMIN — THIAMINE HCL TAB 100 MG 100 MG: 100 TAB at 08:30

## 2024-05-25 RX ADMIN — FOLIC ACID 1 MG: 1 TABLET ORAL at 08:30

## 2024-05-25 RX ADMIN — FENOFIBRATE 145 MG: 145 TABLET ORAL at 08:29

## 2024-05-25 NOTE — PLAN OF CARE
"  Problem: OCCUPATIONAL THERAPY ADULT  Goal: Performs self-care activities at highest level of function for planned discharge setting.  See evaluation for individualized goals.  Description: Treatment Interventions: ADL retraining, Functional transfer training, Endurance training, Cognitive reorientation, Patient/family training, Equipment evaluation/education, Compensatory technique education, Activityengagement          See flowsheet documentation for full assessment, interventions and recommendations.   Outcome: Progressing  Note: Limitation: Decreased ADL status, Decreased Safe judgement during ADL, Decreased cognition, Decreased endurance, Decreased high-level ADLs, Decreased self-care trans  Prognosis: Good  Assessment: Pt seen for Occupational Therapy session with focus on activity tolerance, functional transfers/mob, LB self-care, toilet transfers/clothing management  and standing tolerance and balance for pt engagement in UB/LB self-care tasks.Pt cleared by FREEMAN/Luz for pt participated in OT session. Pt presented   sitting out of bed to bedside chair and agreeable to participate in therapy following pt identifiers confirmed. Pt reported his therapy goal to go to rehab but \"not be there too long\".  Pt would benefit from in-pt rehab OT services as pt currently requires cognitive support for safety awareness and techniques for use of a RW in home environment. Pt wife and daugther present throughout session and reported that pt has small bathroom with no grab bars at home. Pt also required assist for self-care tasks/LB dressing 2* pt visual deficits and coordination.  Pt is appropriate for II (Moderate Resource Intensity). Pt set up to bedside chair post session, and all needs within pt reach     Rehab Resource Intensity Level, OT: II (Moderate Resource Intensity)          "

## 2024-05-25 NOTE — OCCUPATIONAL THERAPY NOTE
"  Occupational Therapy Progress Note     Patient Name: Brian Lal  Today's Date: 5/25/2024  Problem List  Principal Problem:    Hematoma of right thigh  Active Problems:    Acute blood loss anemia    Acute pain due to trauma    Factor V Leiden (HCC)    Fall    Chronic alcohol abuse    Alcohol withdrawal (HCC)    History of depressive symptoms    History of anxiety    Hypertension           Occupational Therapy Treatment Note       05/25/24 1011   OT Last Visit   OT Visit Date 05/25/24   Note Type   Note Type Treatment for insurance authorization   Pain Assessment   Pain Assessment Tool 0-10   Restrictions/Precautions   Weight Bearing Precautions Per Order No   Lifestyle   Autonomy I adls and mobility - shares homemaking with spouse - reports he does not drive 2* visual deficits from prior CVA   Reciprocal Relationships supportive family - wife is able to assist prn   Service to Others retired   Intrinsic Gratification mostly sedentary   ADL   Where Assessed Standing at sink   Grooming Assistance 5  Supervision/Setup   Grooming Deficit Setup;Teeth care   LB Dressing Assistance 4  Minimal Assistance   LB Dressing Deficit Don/doff R sock   Toileting Assistance  4  Minimal Assistance   Toileting Deficit Increased time to complete;Steadying;Setup   Transfers   Sit to Stand 5  Supervision   Additional items Assist x 1   Stand to Sit 5  Supervision   Additional items Assist x 1   Functional Mobility   Functional Mobility 5  Supervision   Additional items Rolling walker   Subjective   Subjective pt stated \"I am going to rehab, [facility] in [place]\"\"   Cognition   Arousal/Participation Arousable;Cooperative   Attention Attends with cues to redirect   Orientation Level Oriented X4   Memory Decreased short term memory;Decreased recall of recent events;Decreased recall of precautions   Following Commands Follows one step commands with increased time or repetition   Activity Tolerance   Activity Tolerance Patient " "tolerated treatment well   Assessment   Assessment Pt seen for Occupational Therapy session with focus on activity tolerance, functional transfers/mob, LB self-care, toilet transfers/clothing management  and standing tolerance and balance for pt engagement in UB/LB self-care tasks.Pt cleared by FREEMAN/Luz for pt participated in OT session. Pt presented   sitting out of bed to bedside chair and agreeable to participate in therapy following pt identifiers confirmed. Pt reported his therapy goal to go to rehab but \"not be there too long\".  Pt would benefit from in-pt rehab OT services as pt currently requires cognitive support for safety awareness and techniques for use of a RW in home environment. Pt wife and daugther present throughout session and reported that pt has small bathroom with no grab bars at home. Pt also required assist for self-care tasks/LB dressing 2* pt visual deficits and coordination.  Pt is appropriate for II (Moderate Resource Intensity). Pt set up to bedside chair post session, and all needs within pt reach   Plan   Treatment Interventions ADL retraining   Goal Expiration Date 06/06/24   OT Treatment Day 1   OT Frequency 2-3x/wk   Discharge Recommendation   Rehab Resource Intensity Level, OT II (Moderate Resource Intensity)   AM-PAC Daily Activity Inpatient   Lower Body Dressing 3   Bathing 3   Toileting 3   Upper Body Dressing 3   Grooming 4   Eating 4   Daily Activity Raw Score 20   Daily Activity Standardized Score (Calc for Raw Score >=11) 42.03   AM-PAC Applied Cognition Inpatient   Following a Speech/Presentation 3   Understanding Ordinary Conversation 4   Taking Medications 3   Remembering Where Things Are Placed or Put Away 3   Remembering List of 4-5 Errands 3   Taking Care of Complicated Tasks 3   Applied Cognition Raw Score 19   Applied Cognition Standardized Score 39.77       Keke GALEANO/MATT             "

## 2024-05-25 NOTE — PLAN OF CARE
Problem: Nutrition/Hydration-ADULT  Goal: Nutrient/Hydration intake appropriate for improving, restoring or maintaining nutritional needs  Description: Monitor and assess patient's nutrition/hydration status for malnutrition. Collaborate with interdisciplinary team and initiate plan and interventions as ordered.  Monitor patient's weight and dietary intake as ordered or per policy. Utilize nutrition screening tool and intervene as necessary. Determine patient's food preferences and provide high-protein, high-caloric foods as appropriate.     INTERVENTIONS:  - Monitor oral intake, urinary output, labs, and treatment plans  - Assess nutrition and hydration status and recommend course of action  - Evaluate amount of meals eaten  - Assist patient with eating if necessary   - Allow adequate time for meals  - Recommend/ encourage appropriate diets, oral nutritional supplements, and vitamin/mineral supplements  - Order, calculate, and assess calorie counts as needed  - Recommend, monitor, and adjust tube feedings and TPN/PPN based on assessed needs  - Assess need for intravenous fluids  - Provide specific nutrition/hydration education as appropriate  - Include patient/family/caregiver in decisions related to nutrition  Outcome: Progressing     Problem: PAIN - ADULT  Goal: Verbalizes/displays adequate comfort level or baseline comfort level  Description: Interventions:  - Encourage patient to monitor pain and request assistance  - Assess pain using appropriate pain scale  - Administer analgesics based on type and severity of pain and evaluate response  - Implement non-pharmacological measures as appropriate and evaluate response  - Consider cultural and social influences on pain and pain management  - Notify physician/advanced practitioner if interventions unsuccessful or patient reports new pain  Outcome: Progressing     Problem: INFECTION - ADULT  Goal: Absence or prevention of progression during  hospitalization  Description: INTERVENTIONS:  - Assess and monitor for signs and symptoms of infection  - Monitor lab/diagnostic results  - Monitor all insertion sites, i.e. indwelling lines, tubes, and drains  - Monitor endotracheal if appropriate and nasal secretions for changes in amount and color  - [place] appropriate cooling/warming therapies per order  - Administer medications as ordered  - Instruct and encourage patient and family to use good hand hygiene technique  - Identify and instruct in appropriate isolation precautions for identified infection/condition  Outcome: Progressing  Goal: Absence of fever/infection during neutropenic period  Description: INTERVENTIONS:  - Monitor WBC    Outcome: Progressing     Problem: SAFETY ADULT  Goal: Patient will remain free of falls  Description: INTERVENTIONS:  - Educate patient/family on patient safety including physical limitations  - Instruct patient to call for assistance with activity   - Consult OT/PT to assist with strengthening/mobility   - Keep Call bell within reach  - Keep bed low and locked with side rails adjusted as appropriate  - Keep care items and personal belongings within reach  - Initiate and maintain comfort rounds  - Make Fall Risk Sign visible to staff  - Offer Toileting   - Apply yellow socks and bracelet for high fall risk patients  - Consider moving patient to room near nurses station  Outcome: Progressing  Goal: Maintain or return to baseline ADL function  Description: INTERVENTIONS:  -  Assess patient's ability to carry out ADLs; assess patient's baseline for ADL function and identify physical deficits which impact ability to perform ADLs (bathing, care of mouth/teeth, toileting, grooming, dressing, etc.)  - Assess/evaluate cause of self-care deficits   - Assess range of motion  - Assess patient's mobility; develop plan if impaired  - Assess patient's need for assistive devices and provide as appropriate  - Encourage maximum independence  but intervene and supervise when necessary  - Involve family in performance of ADLs  - Assess for home care needs following discharge   - Consider OT consult to assist with ADL evaluation and planning for discharge  - Provide patient education as appropriate  Outcome: Progressing  Goal: Maintains/Returns to pre admission functional level  Description: INTERVENTIONS:  - Perform AM-PAC 6 Click Basic Mobility/ Daily Activity assessment daily.  - Set and communicate daily mobility goal to care team and patient/family/caregiver.   - Collaborate with rehabilitation services on mobility goals if consulted  - Reposition patient   - Out of bed for toileting  - Record patient progress and toleration of activity level   Outcome: Progressing     Problem: DISCHARGE PLANNING  Goal: Discharge to home or other facility with appropriate resources  Description: INTERVENTIONS:  - Identify barriers to discharge w/patient and caregiver  - Arrange for needed discharge resources and transportation as appropriate  - Identify discharge learning needs (meds, wound care, etc.)  - Arrange for interpretive services to assist at discharge as needed  - Refer to Case Management Department for coordinating discharge planning if the patient needs post-hospital services based on physician/advanced practitioner order or complex needs related to functional status, cognitive ability, or social support system  Outcome: Progressing     Problem: Knowledge Deficit  Goal: Patient/family/caregiver demonstrates understanding of disease process, treatment plan, medications, and discharge instructions  Description: Complete learning assessment and assess knowledge base.  Interventions:  - Provide teaching at level of understanding  - Provide teaching via preferred learning methods  Outcome: Progressing

## 2024-05-25 NOTE — CASE MANAGEMENT
Case Management Discharge Planning Note    Patient name Brian Lal  Location [facility] 602/[facility] 602-01 MRN [id number]  : 1955 Date 2024       Current Admission Date: 2024  Current Admission Diagnosis:Hematoma of right thigh   Patient Active Problem List    Diagnosis Date Noted Date Diagnosed    Acute blood loss anemia 2024     Acute pain due to trauma 2024     Factor V Leiden (HCC) 2024     Fall 2024     Chronic alcohol abuse 2024     Alcohol withdrawal (HCC) 2024     History of depressive symptoms 2024     History of anxiety 2024     Hypertension 2024     Hematoma of right thigh 2024     History of prostate cancer 2021     Renal cyst 2021     Sleep disturbance 2020     Prostate CA (HCC) 2018     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 localized onset, not intractable, without status epilepticus (HCC) 2018     History of cerebral hemorrhage 2018     Elevated PSA 2018       LOS (days): 3  Geometric Mean LOS (GMLOS) (days): 2.5  Days to GMLOS:-0.6     OBJECTIVE:  Risk of Unplanned Readmission Score: 18.24         Current admission status: Inpatient   Preferred Pharmacy:   [facility]/pharmacy #[id number] [place], [place] - [address]  [address]  [facility] [address]  Phone: [phone] Fax: [phone]    [facility] [address] [place] [address] - [address]  [address]  [address]  [facility] [address]  Phone: [phone] Fax: [phone]    Primary Care Provider: Douglas Charles Shoenberger, MD    Primary Insurance: MEDICARE  Secondary Insurance: [facility]    DISCHARGE DETAILS:            Additional Comments: GIA Duncan informed CM patient leonarda lbe DC ready within 24 hours.  A message was sent to [facility] to see if the patient is accepted.  Awaiting determination for DC needs.  CM to be  available.

## 2024-05-26 LAB
BASOPHILS # BLD AUTO: 0.04 THOUSANDS/ÂΜL (ref 0–0.1)
BASOPHILS NFR BLD AUTO: 1 % (ref 0–1)
EOSINOPHIL # BLD AUTO: 0.19 THOUSAND/ÂΜL (ref 0–0.61)
EOSINOPHIL NFR BLD AUTO: 3 % (ref 0–6)
ERYTHROCYTE [DISTWIDTH] IN BLOOD BY AUTOMATED COUNT: 13.8 % (ref 11.6–15.1)
GLUCOSE SERPL-MCNC: 110 MG/DL (ref 65–140)
GLUCOSE SERPL-MCNC: 129 MG/DL (ref 65–140)
GLUCOSE SERPL-MCNC: 133 MG/DL (ref 65–140)
GLUCOSE SERPL-MCNC: 147 MG/DL (ref 65–140)
HCT VFR BLD AUTO: 31.1 % (ref 36.5–49.3)
HGB BLD-MCNC: 10 G/DL (ref 12–17)
IMM GRANULOCYTES # BLD AUTO: 0.07 THOUSAND/UL (ref 0–0.2)
IMM GRANULOCYTES NFR BLD AUTO: 1 % (ref 0–2)
INR PPP: 1.13 (ref 0.84–1.19)
LYMPHOCYTES # BLD AUTO: 2.2 THOUSANDS/ÂΜL (ref 0.6–4.47)
LYMPHOCYTES NFR BLD AUTO: 33 % (ref 14–44)
MCH RBC QN AUTO: 30 PG (ref 26.8–34.3)
MCHC RBC AUTO-ENTMCNC: 32.2 G/DL (ref 31.4–37.4)
MCV RBC AUTO: 93 FL (ref 82–98)
MONOCYTES # BLD AUTO: 0.46 THOUSAND/ÂΜL (ref 0.17–1.22)
MONOCYTES NFR BLD AUTO: 7 % (ref 4–12)
NEUTROPHILS # BLD AUTO: 3.64 THOUSANDS/ÂΜL (ref 1.85–7.62)
NEUTS SEG NFR BLD AUTO: 55 % (ref 43–75)
NRBC BLD AUTO-RTO: 0 /100 WBCS
PLATELET # BLD AUTO: 230 THOUSANDS/UL (ref 149–390)
PMV BLD AUTO: 9.7 FL (ref 8.9–12.7)
PROTHROMBIN TIME: 14.4 SECONDS (ref 11.6–14.5)
RBC # BLD AUTO: 3.33 MILLION/UL (ref 3.88–5.62)
WBC # BLD AUTO: 6.6 THOUSAND/UL (ref 4.31–10.16)

## 2024-05-26 PROCEDURE — 82948 REAGENT STRIP/BLOOD GLUCOSE: CPT

## 2024-05-26 PROCEDURE — 85610 PROTHROMBIN TIME: CPT | Performed by: PHYSICIAN ASSISTANT

## 2024-05-26 PROCEDURE — 99232 SBSQ HOSP IP/OBS MODERATE 35: CPT | Performed by: PHYSICIAN ASSISTANT

## 2024-05-26 PROCEDURE — 85025 COMPLETE CBC W/AUTO DIFF WBC: CPT | Performed by: PHYSICIAN ASSISTANT

## 2024-05-26 RX ORDER — LORAZEPAM 0.5 MG/1
0.5 TABLET ORAL 3 TIMES DAILY PRN
Status: DISCONTINUED | OUTPATIENT
Start: 2024-05-26 | End: 2024-05-28 | Stop reason: HOSPADM

## 2024-05-26 RX ADMIN — WARFARIN SODIUM 3.5 MG: 2.5 TABLET ORAL at 17:21

## 2024-05-26 RX ADMIN — Medication 1 TABLET: at 08:03

## 2024-05-26 RX ADMIN — ACETAMINOPHEN 650 MG: 325 TABLET, FILM COATED ORAL at 05:46

## 2024-05-26 RX ADMIN — LEVETIRACETAM 500 MG: 500 TABLET, FILM COATED ORAL at 08:03

## 2024-05-26 RX ADMIN — Medication 100 MCG: at 08:03

## 2024-05-26 RX ADMIN — ENOXAPARIN SODIUM 90 MG: 100 INJECTION SUBCUTANEOUS at 21:35

## 2024-05-26 RX ADMIN — CHLORHEXIDINE GLUCONATE 15 ML: 1.2 SOLUTION ORAL at 08:02

## 2024-05-26 RX ADMIN — ENOXAPARIN SODIUM 90 MG: 100 INJECTION SUBCUTANEOUS at 08:02

## 2024-05-26 RX ADMIN — FOLIC ACID 1 MG: 1 TABLET ORAL at 08:02

## 2024-05-26 RX ADMIN — LEVETIRACETAM 1000 MG: 500 TABLET, FILM COATED ORAL at 17:21

## 2024-05-26 RX ADMIN — POLYETHYLENE GLYCOL 3350 17 G: 17 POWDER, FOR SOLUTION ORAL at 08:02

## 2024-05-26 RX ADMIN — FENOFIBRATE 145 MG: 145 TABLET ORAL at 08:03

## 2024-05-26 RX ADMIN — CHLORHEXIDINE GLUCONATE 15 ML: 1.2 SOLUTION ORAL at 21:35

## 2024-05-26 RX ADMIN — LORAZEPAM 0.5 MG: 0.5 TABLET ORAL at 08:03

## 2024-05-26 RX ADMIN — THIAMINE HCL TAB 100 MG 100 MG: 100 TAB at 08:02

## 2024-05-26 RX ADMIN — ATORVASTATIN CALCIUM 10 MG: 10 TABLET, FILM COATED ORAL at 17:22

## 2024-05-26 RX ADMIN — LORAZEPAM 0.5 MG: 0.5 TABLET ORAL at 17:22

## 2024-05-26 RX ADMIN — METOPROLOL TARTRATE 25 MG: 25 TABLET, FILM COATED ORAL at 08:03

## 2024-05-26 RX ADMIN — Medication 2000 UNITS: at 08:03

## 2024-05-26 RX ADMIN — LORAZEPAM 0.5 MG: 0.5 TABLET ORAL at 21:35

## 2024-05-26 NOTE — PLAN OF CARE
Problem: Nutrition/Hydration-ADULT  Goal: Nutrient/Hydration intake appropriate for improving, restoring or maintaining nutritional needs  Description: Monitor and assess patient's nutrition/hydration status for malnutrition. Collaborate with interdisciplinary team and initiate plan and interventions as ordered.  Monitor patient's weight and dietary intake as ordered or per policy. Utilize nutrition screening tool and intervene as necessary. Determine patient's food preferences and provide high-protein, high-caloric foods as appropriate.     INTERVENTIONS:  - Monitor oral intake, urinary output, labs, and treatment plans  - Assess nutrition and hydration status and recommend course of action  - Evaluate amount of meals eaten  - Assist patient with eating if necessary   - Allow adequate time for meals  - Recommend/ encourage appropriate diets, oral nutritional supplements, and vitamin/mineral supplements  - Order, calculate, and assess calorie counts as needed  - Recommend, monitor, and adjust tube feedings and TPN/PPN based on assessed needs  - Assess need for intravenous fluids  - Provide specific nutrition/hydration education as appropriate  - Include patient/family/caregiver in decisions related to nutrition  Outcome: Progressing     Problem: PAIN - ADULT  Goal: Verbalizes/displays adequate comfort level or baseline comfort level  Description: Interventions:  - Encourage patient to monitor pain and request assistance  - Assess pain using appropriate pain scale  - Administer analgesics based on type and severity of pain and evaluate response  - Implement non-pharmacological measures as appropriate and evaluate response  - Consider cultural and social influences on pain and pain management  - Notify physician/advanced practitioner if interventions unsuccessful or patient reports new pain  Outcome: Progressing     Problem: INFECTION - ADULT  Goal: Absence or prevention of progression during  hospitalization  Description: INTERVENTIONS:  - Assess and monitor for signs and symptoms of infection  - Monitor lab/diagnostic results  - Monitor all insertion sites, i.e. indwelling lines, tubes, and drains  - Monitor endotracheal if appropriate and nasal secretions for changes in amount and color  - [place] appropriate cooling/warming therapies per order  - Administer medications as ordered  - Instruct and encourage patient and family to use good hand hygiene technique  - Identify and instruct in appropriate isolation precautions for identified infection/condition  Outcome: Progressing  Goal: Absence of fever/infection during neutropenic period  Description: INTERVENTIONS:  - Monitor WBC    Outcome: Progressing     Problem: SAFETY ADULT  Goal: Patient will remain free of falls  Description: INTERVENTIONS:  - Educate patient/family on patient safety including physical limitations  - Instruct patient to call for assistance with activity   - Consult OT/PT to assist with strengthening/mobility   - Keep Call bell within reach  - Keep bed low and locked with side rails adjusted as appropriate  - Keep care items and personal belongings within reach  - Initiate and maintain comfort rounds  - Make Fall Risk Sign visible to staff  - Offer Toileting   - Apply yellow socks and bracelet for high fall risk patients  - Consider moving patient to room near nurses station  Outcome: Progressing  Goal: Maintain or return to baseline ADL function  Description: INTERVENTIONS:  -  Assess patient's ability to carry out ADLs; assess patient's baseline for ADL function and identify physical deficits which impact ability to perform ADLs (bathing, care of mouth/teeth, toileting, grooming, dressing, etc.)  - Assess/evaluate cause of self-care deficits   - Assess range of motion  - Assess patient's mobility; develop plan if impaired  - Assess patient's need for assistive devices and provide as appropriate  - Encourage maximum independence  but intervene and supervise when necessary  - Involve family in performance of ADLs  - Assess for home care needs following discharge   - Consider OT consult to assist with ADL evaluation and planning for discharge  - Provide patient education as appropriate  Outcome: Progressing  Goal: Maintains/Returns to pre admission functional level  Description: INTERVENTIONS:  - Perform AM-PAC 6 Click Basic Mobility/ Daily Activity assessment daily.  - Set and communicate daily mobility goal to care team and patient/family/caregiver.   - Collaborate with rehabilitation services on mobility goals if consulted  - Reposition patient   - Out of bed for toileting  - Record patient progress and toleration of activity level   Outcome: Progressing     Problem: DISCHARGE PLANNING  Goal: Discharge to home or other facility with appropriate resources  Description: INTERVENTIONS:  - Identify barriers to discharge w/patient and caregiver  - Arrange for needed discharge resources and transportation as appropriate  - Identify discharge learning needs (meds, wound care, etc.)  - Arrange for interpretive services to assist at discharge as needed  - Refer to Case Management Department for coordinating discharge planning if the patient needs post-hospital services based on physician/advanced practitioner order or complex needs related to functional status, cognitive ability, or social support system  Outcome: Progressing     Problem: Knowledge Deficit  Goal: Patient/family/caregiver demonstrates understanding of disease process, treatment plan, medications, and discharge instructions  Description: Complete learning assessment and assess knowledge base.  Interventions:  - Provide teaching at level of understanding  - Provide teaching via preferred learning methods  Outcome: Progressing

## 2024-05-26 NOTE — PLAN OF CARE
Problem: PAIN - ADULT  Goal: Verbalizes/displays adequate comfort level or baseline comfort level  Description: Interventions:  - Encourage patient to monitor pain and request assistance  - Assess pain using appropriate pain scale  - Administer analgesics based on type and severity of pain and evaluate response  - Implement non-pharmacological measures as appropriate and evaluate response  - Consider cultural and social influences on pain and pain management  - Notify physician/advanced practitioner if interventions unsuccessful or patient reports new pain  5/26/2024 0959 by Luz Swartz RN  Outcome: Progressing  5/26/2024 0959 by Luz Swartz RN  Outcome: Progressing     Problem: INFECTION - ADULT  Goal: Absence or prevention of progression during hospitalization  Description: INTERVENTIONS:  - Assess and monitor for signs and symptoms of infection  - Monitor lab/diagnostic results  - Monitor all insertion sites, i.e. indwelling lines, tubes, and drains  - Monitor endotracheal if appropriate and nasal secretions for changes in amount and color  - [place] appropriate cooling/warming therapies per order  - Administer medications as ordered  - Instruct and encourage patient and family to use good hand hygiene technique  - Identify and instruct in appropriate isolation precautions for identified infection/condition  5/26/2024 0959 by Luz Swartz RN  Outcome: Progressing  5/26/2024 0959 by Luz Swartz RN  Outcome: Progressing  Goal: Absence of fever/infection during neutropenic period  Description: INTERVENTIONS:  - Monitor WBC    5/26/2024 0959 by Luz Swartz RN  Outcome: Progressing  5/26/2024 0959 by Luz Swartz RN  Outcome: Progressing

## 2024-05-26 NOTE — ASSESSMENT & PLAN NOTE
- s/p IR embolization on 5/22 with no active bleeding seen in the right thigh, but 2 proximal profunda branches supplying the region of the hematoma embolized with Gelfoam.   - warfarin held and reversed with K-Centra and Vitamin K on admission  - Hgb stabilized at 9.1. He did not require blood transfusion.   - INR on 5/25 is 1.1. Transition to therapeutic Lovenox for h/o Factor V Leiden with coumadin resumed, while awaiting therapeutic INR.    - dressing change daily to right thigh abrasion  - right thigh is soft, NVI distally  - no signs of ongoing bleeding

## 2024-05-26 NOTE — ASSESSMENT & PLAN NOTE
- fall at home due to loss of balance  - PT/OT evaluations recommending inpatient rehab.   - CM for dispo planning. Possible placement at [facility].

## 2024-05-26 NOTE — PLAN OF CARE
Problem: PAIN - ADULT  Goal: Verbalizes/displays adequate comfort level or baseline comfort level  Description: Interventions:  - Encourage patient to monitor pain and request assistance  - Assess pain using appropriate pain scale  - Administer analgesics based on type and severity of pain and evaluate response  - Implement non-pharmacological measures as appropriate and evaluate response  - Consider cultural and social influences on pain and pain management  - Notify physician/advanced practitioner if interventions unsuccessful or patient reports new pain  Outcome: Progressing     Problem: INFECTION - ADULT  Goal: Absence or prevention of progression during hospitalization  Description: INTERVENTIONS:  - Assess and monitor for signs and symptoms of infection  - Monitor lab/diagnostic results  - Monitor all insertion sites, i.e. indwelling lines, tubes, and drains  - Monitor endotracheal if appropriate and nasal secretions for changes in amount and color  - [place] appropriate cooling/warming therapies per order  - Administer medications as ordered  - Instruct and encourage patient and family to use good hand hygiene technique  - Identify and instruct in appropriate isolation precautions for identified infection/condition  Outcome: Progressing  Goal: Absence of fever/infection during neutropenic period  Description: INTERVENTIONS:  - Monitor WBC    Outcome: Progressing

## 2024-05-26 NOTE — ASSESSMENT & PLAN NOTE
- Hgb stable at 9.1  - no active bleeding clinically  - s/p IR embolization of R thigh hematoma which is the source of bleeding  - no need for blood transfusion  - resumed coumadin 5/24 and therapeutic lovenox pending therapeutic INR

## 2024-05-26 NOTE — ASSESSMENT & PLAN NOTE
- continue home metoprolol at 25 mg BID dosing of metoprolol tartrate (takes metoprolol succinate 50 mg once daily at home)  - plan to resume lisinopril 5 mg daily if BP warrants

## 2024-05-26 NOTE — ASSESSMENT & PLAN NOTE
- h/o DVT/PE and stroke  - Coumadin held/reversed on 5/22 with Vitamin K/K-Centra due to thigh hematoma with active extravasation now s/p IR embolization  - resumed Coumadin 5/24. Hgb stable at 9.1 and INR 1.1 on 5/25. Will transition to therapeutic lovenox pending INR 2.0-3.0.

## 2024-05-26 NOTE — ASSESSMENT & PLAN NOTE
- family requesting psychiatric consultation- consult completed and recommending outpatient AA meetings, no medication additions/adjustments

## 2024-05-26 NOTE — PROGRESS NOTES
[facility]  Progress Note  Name: Brian Lal I  MRN: [id number]  Unit/Bed#: [facility] 602-01 I Date of Admission: 5/22/2024   Date of Service: 5/25/2024 I Hospital Day: 3    Assessment & Plan   Fall  Assessment & Plan  - fall at home due to loss of balance  - PT/OT evaluations recommending inpatient rehab.   - CM for dispo planning. Possible placement at [facility].     * Hematoma of right thigh  Assessment & Plan  - s/p IR embolization on 5/22 with no active bleeding seen in the right thigh, but 2 proximal profunda branches supplying the region of the hematoma embolized with Gelfoam.   - warfarin held and reversed with K-Centra and Vitamin K on admission  - Hgb stabilized at 9.1. He did not require blood transfusion.   - INR on 5/25 is 1.1. Transition to therapeutic Lovenox for h/o Factor V Leiden with coumadin resumed, while awaiting therapeutic INR.    - dressing change daily to right thigh abrasion  - right thigh is soft, NVI distally  - no signs of ongoing bleeding    Acute pain due to trauma  Assessment & Plan  - Acute pain secondary to traumatic injuries.  - Continue multimodal analgesic regimen.  - Bowel regimen as long as using opioids.  - Continue to monitor pain and adjust regimen as indicated.      Acute blood loss anemia  Assessment & Plan  - Hgb stable at 9.1  - no active bleeding clinically  - s/p IR embolization of R thigh hematoma which is the source of bleeding  - no need for blood transfusion  - resumed coumadin 5/24 and therapeutic lovenox pending therapeutic INR    Hypertension  Assessment & Plan  - continue home metoprolol at 25 mg BID dosing of metoprolol tartrate (takes metoprolol succinate 50 mg once daily at home)  - plan to resume lisinopril 5 mg daily if BP warrants    History of anxiety  Assessment & Plan  - takes Xanax 0.25 mg TID prn at home  - currently ordered, however he is on CIWA protocol and receiving benzodiazepines per protocol as well for  "intermittent withdrawal symptoms    History of depressive symptoms  Assessment & Plan  - family requesting psychiatric consultation- consult completed and recommending outpatient AA meetings, no medication additions/adjustments    Alcohol withdrawal (HCC)  Assessment & Plan  - history of chronic alcohol abuse, drinking 10 beers per week on average  - positive CIWA scores this admission. Received phenobarbital in ICU and on 5/25. Receiving IV benzodiazepines per CIWA protocol on the floor.   - continue thiamine, folic acid, MVD   - CM for SBIRT evaluation    Chronic alcohol abuse  Assessment & Plan  - history of chronic alcohol abuse, drinking 10 beers per week on average  - positive CIWA scores this admission. Received phenobarbital in ICU and on 5/25. Receiving IV benzodiazepines per CIWA protocol on the floor.   - continue thiamine, folic acid, MVD   - CM for SBIRT evaluation    Factor V Leiden (HCC)  Assessment & Plan  - h/o DVT/PE and stroke  - Coumadin held/reversed on 5/22 with Vitamin K/K-Centra due to thigh hematoma with active extravasation now s/p IR embolization  - resumed Coumadin 5/24. Hgb stable at 9.1 and INR 1.1 on 5/25. Will transition to therapeutic lovenox pending INR 2.0-3.0.              Bowel Regimen: senokot, miralax  VTE Prophylaxis:Sequential compression device (Venodyne) , Enoxaparin (Lovenox), and Warfarin (Coumadin)     Disposition: continue med-surg status, rehab placement pending, therapeutic lovenox bridge to coumadin    Subjective   Chief Complaint: \"I'm feeling well\"    Subjective: Patient is currently feeling well. He did have some anxiety/tremors overnight which are resolved now following phenobarbital. He is agreeable to inpatient rehab. Pain is controlled.      Objective   Vitals:   Temp:  [97.8 °F (36.6 °C)-98.6 °F (37 °C)] 98.5 °F (36.9 °C)  HR:  [43-73] 73  Resp:  [16-18] 18  BP: ()/(46-78) 133/75    I/O         05/24 0701  05/25 0700 05/25 0701  05/26 0700    P.O. 402 " 624    I.V. (mL/kg)      Total Intake(mL/kg) 402 (4.1) 624 (6.3)    Urine (mL/kg/hr) 725 (0.3) 450 (0.3)    Total Output 725 450    Net -323 +174          Unmeasured Urine Occurrence 1 x 1 x    Unmeasured Stool Occurrence 1 x              Physical Exam:   GENERAL APPEARANCE: NAD  NEURO: GCS 15,non-focal  HEENT: NCAT  CV: RRR, no MGR  LUNGS: CTA bilaterally  GI: soft,non-tender,non-distended  : voiding  MSK: + R thigh hematoma soft, stable; + R thigh abrasion clean and dry with dressing in place; no significant peripheral edema  SKIN: pink, warm, dry    Invasive Devices       Peripheral Intravenous Line  Duration             Peripheral IV 05/22/24 Distal;Left;Ventral (anterior) Forearm 3 days    Peripheral IV 05/22/24 Right;Upper Arm 2 days                          Lab Results: Results: I have personally reviewed all pertinent laboratory/tests results, BMP/CMP:   Lab Results   Component Value Date    SODIUM 138 05/25/2024    K 4.0 05/25/2024     05/25/2024    CO2 24 05/25/2024    BUN 14 05/25/2024    CREATININE 1.07 05/25/2024    CALCIUM 8.5 05/25/2024    EGFR 70 05/25/2024   , CBC:   Lab Results   Component Value Date    WBC 7.18 05/25/2024    HGB 9.1 (L) 05/25/2024    HCT 28.3 (L) 05/25/2024    MCV 93 05/25/2024     05/25/2024    RBC 3.06 (L) 05/25/2024    MCH 29.7 05/25/2024    MCHC 32.2 05/25/2024    RDW 13.7 05/25/2024    MPV 10.1 05/25/2024    NRBC 0 05/25/2024   , and Coagulation:   Lab Results   Component Value Date    INR 1.18 05/25/2024     Imaging: I have personally reviewed pertinent reports.     Other Studies: no new

## 2024-05-26 NOTE — ASSESSMENT & PLAN NOTE
- history of chronic alcohol abuse, drinking 10 beers per week on average  - positive CIWA scores this admission. Received phenobarbital in ICU and on 5/25. Receiving IV benzodiazepines per CIWA protocol on the floor.   - continue thiamine, folic acid, MVD   - CM for SBIRT evaluation

## 2024-05-27 LAB
GLUCOSE SERPL-MCNC: 132 MG/DL (ref 65–140)
GLUCOSE SERPL-MCNC: 139 MG/DL (ref 65–140)
GLUCOSE SERPL-MCNC: 146 MG/DL (ref 65–140)
GLUCOSE SERPL-MCNC: 151 MG/DL (ref 65–140)
INR PPP: 1.21 (ref 0.84–1.19)
PROTHROMBIN TIME: 15.1 SECONDS (ref 11.6–14.5)

## 2024-05-27 PROCEDURE — 85610 PROTHROMBIN TIME: CPT | Performed by: PHYSICIAN ASSISTANT

## 2024-05-27 PROCEDURE — 99233 SBSQ HOSP IP/OBS HIGH 50: CPT | Performed by: SURGERY

## 2024-05-27 PROCEDURE — 97116 GAIT TRAINING THERAPY: CPT

## 2024-05-27 PROCEDURE — 97112 NEUROMUSCULAR REEDUCATION: CPT

## 2024-05-27 PROCEDURE — 82948 REAGENT STRIP/BLOOD GLUCOSE: CPT

## 2024-05-27 PROCEDURE — 97530 THERAPEUTIC ACTIVITIES: CPT

## 2024-05-27 RX ADMIN — METOPROLOL TARTRATE 25 MG: 25 TABLET, FILM COATED ORAL at 08:38

## 2024-05-27 RX ADMIN — CHLORHEXIDINE GLUCONATE 15 ML: 1.2 SOLUTION ORAL at 21:42

## 2024-05-27 RX ADMIN — LEVETIRACETAM 1000 MG: 500 TABLET, FILM COATED ORAL at 17:21

## 2024-05-27 RX ADMIN — ACETAMINOPHEN 650 MG: 325 TABLET, FILM COATED ORAL at 05:07

## 2024-05-27 RX ADMIN — INSULIN LISPRO 1 UNITS: 100 INJECTION, SOLUTION INTRAVENOUS; SUBCUTANEOUS at 08:38

## 2024-05-27 RX ADMIN — POLYETHYLENE GLYCOL 3350 17 G: 17 POWDER, FOR SOLUTION ORAL at 08:38

## 2024-05-27 RX ADMIN — ENOXAPARIN SODIUM 90 MG: 100 INJECTION SUBCUTANEOUS at 08:38

## 2024-05-27 RX ADMIN — Medication 1 TABLET: at 08:40

## 2024-05-27 RX ADMIN — CYANOCOBALAMIN TAB 500 MCG 1000 MCG: 500 TAB at 08:40

## 2024-05-27 RX ADMIN — LEVETIRACETAM 500 MG: 500 TABLET, FILM COATED ORAL at 08:39

## 2024-05-27 RX ADMIN — ATORVASTATIN CALCIUM 10 MG: 10 TABLET, FILM COATED ORAL at 17:21

## 2024-05-27 RX ADMIN — CHLORHEXIDINE GLUCONATE 15 ML: 1.2 SOLUTION ORAL at 08:39

## 2024-05-27 RX ADMIN — THIAMINE HCL TAB 100 MG 100 MG: 100 TAB at 08:40

## 2024-05-27 RX ADMIN — LORAZEPAM 0.5 MG: 0.5 TABLET ORAL at 21:45

## 2024-05-27 RX ADMIN — FOLIC ACID 1 MG: 1 TABLET ORAL at 08:39

## 2024-05-27 RX ADMIN — Medication 100 MCG: at 08:37

## 2024-05-27 RX ADMIN — ENOXAPARIN SODIUM 90 MG: 100 INJECTION SUBCUTANEOUS at 21:42

## 2024-05-27 RX ADMIN — FENOFIBRATE 145 MG: 145 TABLET ORAL at 08:39

## 2024-05-27 RX ADMIN — LORAZEPAM 0.5 MG: 0.5 TABLET ORAL at 16:00

## 2024-05-27 RX ADMIN — WARFARIN SODIUM 3.5 MG: 2.5 TABLET ORAL at 17:21

## 2024-05-27 RX ADMIN — Medication 2000 UNITS: at 08:40

## 2024-05-27 NOTE — ASSESSMENT & PLAN NOTE
- takes Xanax 0.5 mg TID prn at home  - currently ordered, however he is on CIWA protocol and receiving benzodiazepines per protocol as well for intermittent withdrawal symptoms

## 2024-05-27 NOTE — ASSESSMENT & PLAN NOTE
- Hgb stable at 10  - no active bleeding clinically  - s/p IR embolization of R thigh hematoma which is the source of bleeding  - no need for blood transfusion  - resumed coumadin 5/24 and therapeutic lovenox pending therapeutic INR

## 2024-05-27 NOTE — PLAN OF CARE
Problem: PHYSICAL THERAPY ADULT  Goal: Performs mobility at highest level of function for planned discharge setting.  See evaluation for individualized goals.  Description: Treatment/Interventions: OT, Spoke to case management, Spoke to nursing, Gait training, Bed mobility, Patient/family training, Endurance training, LE strengthening/ROM, Functional transfer training          See flowsheet documentation for full assessment, interventions and recommendations.  Outcome: Progressing  Note: Prognosis: Good  Problem List: Impaired balance, Decreased mobility, Decreased strength  Assessment: Pt seen for PT treatment session w/ interventions consisting of bed mobility training, t/f training, gait training, stair training, balance training, + pt/family education. Overall pt demonstrated excellent progress w/ physical assistance required for all levels of mobility and increased gait distance to 200' w/o device. Pt scored a 24/28 on the [place], indicating low risk for falls. Pt w/ deficits in the following areas: uses arm to help for t/f, discontinuous steps w/ 360 deg turn, mild path deviation during gait training, and heels apart during gait training. Based on pt's progress, recommend HHPT. Returned at a later time per Trauma AP's request as pt's dtr had concerns re: change in d/c recommendation to home. Pt's dtr concerned that pt's spouse will not be able to assist pt and that pt has had multiple hospital admissions within the past 3 months. All questions answered. Gait + stair training repeated w/ pt w/ pt's dtr present.  Barriers to Discharge: Inaccessible home environment     Rehab Resource Intensity Level, PT: III (Minimum Resource Intensity)    See flowsheet documentation for full assessment.

## 2024-05-27 NOTE — PROGRESS NOTES
[facility]  Progress Note  Name: Brian Lal I  MRN: [id number]  Unit/Bed#: [facility] 602-01 I Date of Admission: 5/22/2024   Date of Service: 5/26/2024 I Hospital Day: 4    Assessment & Plan   Fall  Assessment & Plan  - fall at home due to loss of balance  - PT/OT evaluations recommending inpatient rehab.   -  for dispo planning. Possible placement at [facility] Tuesday if they officially accept.     * Hematoma of right thigh  Assessment & Plan  - s/p IR embolization on 5/22 with no active bleeding seen in the right thigh, but 2 proximal profunda branches supplying the region of the hematoma embolized with Gelfoam.   - warfarin held and reversed with K-Centra and Vitamin K on admission  - Hgb stabilized at 10. He did not require blood transfusion.   - INR on 5/26 is 1.1. Continue therapeutic Lovenox for h/o Factor V Leiden with coumadin resumed, while awaiting therapeutic INR.    - dressing change daily to right thigh abrasion  - right thigh is soft, NVI distally  - no signs of ongoing bleeding    Acute pain due to trauma  Assessment & Plan  - Acute pain secondary to traumatic injuries.  - Continue multimodal analgesic regimen.  - Bowel regimen as long as using opioids.  - Continue to monitor pain and adjust regimen as indicated.      Acute blood loss anemia  Assessment & Plan  - Hgb stable at 10  - no active bleeding clinically  - s/p IR embolization of R thigh hematoma which is the source of bleeding  - no need for blood transfusion  - resumed coumadin 5/24 and therapeutic lovenox pending therapeutic INR    Hypertension  Assessment & Plan  - continue home metoprolol at 25 mg BID dosing of metoprolol tartrate (takes metoprolol succinate 50 mg once daily at home)  - plan to resume lisinopril 5 mg daily if BP warrants    History of anxiety  Assessment & Plan  - takes Xanax 0.25 mg TID prn at home  - currently ordered, however he is on CIWA protocol and receiving benzodiazepines per  "protocol as well for intermittent withdrawal symptoms    History of depressive symptoms  Assessment & Plan  - family requesting psychiatric consultation- consult completed and recommending outpatient AA meetings, no medication additions/adjustments    Alcohol withdrawal (HCC)  Assessment & Plan  - history of chronic alcohol abuse, drinking 10 beers per week on average  - positive CIWA scores this admission. Received phenobarbital in ICU and on 5/25. Receiving IV benzodiazepines per CIWA protocol on the floor. Much improved, no need for medication in the last 24 hours.   - continue thiamine, folic acid, MVD   - CM for SBIRT evaluation    Chronic alcohol abuse  Assessment & Plan  - history of chronic alcohol abuse, drinking 10 beers per week on average  - positive CIWA scores this admission. Received phenobarbital in ICU and on 5/25. Receiving IV benzodiazepines per CIWA protocol on the floor. Much improved, no need for medication in the last 24 hours.   - continue thiamine, folic acid, MVD   - CM for SBIRT evaluation    Factor V Leiden (HCC)  Assessment & Plan  - h/o DVT/PE and stroke  - Coumadin held/reversed on 5/22 with Vitamin K/K-Centra due to thigh hematoma with active extravasation now s/p IR embolization  - resumed Coumadin 5/24. Hgb stable at 9.1 and INR 1.1 on 5/26. Continue bridging with therapeutic lovenox pending INR 2.0-3.0.              Bowel Regimen: senokot, miralax  VTE Prophylaxis:Sequential compression device (Venodyne)  and Enoxaparin (Lovenox)     Disposition: continue med-surg status, rehab placement at [facility] pending acceptance, will f/u with facility on Tuesday per CM    Subjective   Chief Complaint: \"I\"m feeling better\"    Subjective: Pain is adequately controlled. He slept well overnight. No symptoms of withdrawal overnight. He is tolerating a diet. He is motivated to go to rehab.      Objective   Vitals:   Temp:  [97.7 °F (36.5 °C)-98.1 °F (36.7 °C)] 97.7 °F (36.5 °C)  HR:  [38-81] 70  Resp: " " [18] 18  BP: (104-146)/(61-88) 133/67    I/O         05/25 0701  05/26 0700 05/26 0701 05/27 0700    P.O. 624 402    Total Intake(mL/kg) 624 (6.3) 402 (4)    Urine (mL/kg/hr) 1475 (0.6) 351 (0.3)    Stool  0    Total Output 1475 351    Net -851 +51          Unmeasured Urine Occurrence 1 x 3 x    Unmeasured Stool Occurrence  2 x             Physical Exam:   GENERAL APPEARANCE: NAD  NEURO: GCS 15,non-focal  HEENT: NCAT  CV: RRR ,no MGR  LUNGS: CTA bilaterally  GI: soft,non-tender,non-distended  : voiding  MSK: + R thigh hematoma soft, stable, with abrasion laterally clean and dry; NVI distally   SKIN: pink, warm, dry    Invasive Devices       Peripheral Intravenous Line  Duration             Peripheral IV 05/26/24 Left Antecubital <1 day                          Lab Results: Results: I have personally reviewed all pertinent laboratory/tests results, BMP/CMP: No results found for: \"SODIUM\", \"K\", \"CL\", \"CO2\", \"ANIONGAP\", \"BUN\", \"CREATININE\", \"GLUCOSE\", \"CALCIUM\", \"AST\", \"ALT\", \"ALKPHOS\", \"PROT\", \"BILITOT\", \"EGFR\", and CBC:   Lab Results   Component Value Date    WBC 6.60 05/26/2024    HGB 10.0 (L) 05/26/2024    HCT 31.1 (L) 05/26/2024    MCV 93 05/26/2024     05/26/2024    RBC 3.33 (L) 05/26/2024    MCH 30.0 05/26/2024    MCHC 32.2 05/26/2024    RDW 13.8 05/26/2024    MPV 9.7 05/26/2024    NRBC 0 05/26/2024     Imaging: I have personally reviewed pertinent reports.     Other Studies: no new       "

## 2024-05-27 NOTE — ASSESSMENT & PLAN NOTE
- history of chronic alcohol abuse, drinking 10 beers per week on average  - positive CIWA scores this admission. Received phenobarbital in ICU and on 5/25. Receiving IV benzodiazepines per CIWA protocol on the floor. Much improved, no need for medication in the last 24 hours.   - continue thiamine, folic acid, MVD   - CM for SBIRT evaluation

## 2024-05-27 NOTE — ASSESSMENT & PLAN NOTE
- h/o DVT/PE and stroke  - Coumadin held/reversed on 5/22 with Vitamin K/K-Centra due to thigh hematoma with active extravasation now s/p IR embolization  - resumed Coumadin 5/24. Hgb stable at 9.1 and INR 1.1 on 5/26. Continue bridging with therapeutic lovenox pending INR 2.0-3.0.

## 2024-05-27 NOTE — ASSESSMENT & PLAN NOTE
- h/o DVT/PE and stroke  - Coumadin held/reversed on 5/22 with Vitamin K/K-Centra due to thigh hematoma with active extravasation now s/p IR embolization  - resumed Coumadin 5/24. Hgb stable at 9.1 and INR 1.2 on 5/27. Continue bridging with therapeutic lovenox pending INR 2.0-3.0.

## 2024-05-27 NOTE — ASSESSMENT & PLAN NOTE
- s/p IR embolization on 5/22 with no active bleeding seen in the right thigh, but 2 proximal profunda branches supplying the region of the hematoma embolized with Gelfoam.   - warfarin held and reversed with K-Centra and Vitamin K on admission  - Hgb stabilized at 10. He did not require blood transfusion.   - INR on 5/27 is 1.2. Continue therapeutic Lovenox for h/o Factor V Leiden with coumadin resumed, while awaiting therapeutic INR.    - dressing change daily to right thigh abrasion  - right thigh is soft, NVI distally  - no signs of ongoing bleeding

## 2024-05-27 NOTE — ASSESSMENT & PLAN NOTE
- fall at home due to loss of balance  - PT/OT evaluations recommending inpatient rehab.   - CM for dispo planning. Possible placement at [facility] Tuesday if they officially accept.

## 2024-05-27 NOTE — PROGRESS NOTES
[facility]  Progress Note  Name: Brian Lal I  MRN: [id number]  [facility] Date of Admission: 5/22/2024   Date of Service: 5/27/2024 I Hospital Day: 5    Assessment & Plan   Fall  Assessment & Plan  - fall at home due to loss of balance  - PT/OT evaluations recommending inpatient rehab.   -  for dispo planning. Possible placement at [facility] Tuesday if they officially accept.     * Hematoma of right thigh  Assessment & Plan  - s/p IR embolization on 5/22 with no active bleeding seen in the right thigh, but 2 proximal profunda branches supplying the region of the hematoma embolized with Gelfoam.   - warfarin held and reversed with K-Centra and Vitamin K on admission  - Hgb stabilized at 10. He did not require blood transfusion.   - INR on 5/27 is 1.2. Continue therapeutic Lovenox for h/o Factor V Leiden with coumadin resumed, while awaiting therapeutic INR.    - dressing change daily to right thigh abrasion  - right thigh is soft, NVI distally  - no signs of ongoing bleeding    Acute pain due to trauma  Assessment & Plan  - Acute pain secondary to traumatic injuries.  - Continue multimodal analgesic regimen.  - Bowel regimen as long as using opioids.  - Continue to monitor pain and adjust regimen as indicated.      Acute blood loss anemia  Assessment & Plan  - Hgb stable at 10  - no active bleeding clinically  - s/p IR embolization of R thigh hematoma which is the source of bleeding  - no need for blood transfusion  - resumed coumadin 5/24 and therapeutic lovenox pending therapeutic INR    Hypertension  Assessment & Plan  - continue home metoprolol at 25 mg BID dosing of metoprolol tartrate (takes metoprolol succinate 50 mg once daily at home)  - plan to resume lisinopril 5 mg daily if BP warrants    History of anxiety  Assessment & Plan  - takes Xanax 0.5 mg TID prn at home  - currently ordered, however he is on CIWA protocol and receiving benzodiazepines per  "protocol as well for intermittent withdrawal symptoms    History of depressive symptoms  Assessment & Plan  - family requesting psychiatric consultation- consult completed and recommending outpatient AA meetings, no medication additions/adjustments    Alcohol withdrawal (HCC)  Assessment & Plan  - history of chronic alcohol abuse, drinking 10 beers per week on average  - positive CIWA scores this admission. Received phenobarbital in ICU and on 5/25. Receiving IV benzodiazepines per CIWA protocol on the floor. Much improved, no need for medication in the last 48 hours.   - continue thiamine, folic acid, MVD   - CM for SBIRT evaluation    Chronic alcohol abuse  Assessment & Plan  - history of chronic alcohol abuse, drinking 10 beers per week on average  - positive CIWA scores this admission. Received phenobarbital in ICU and on 5/25. Receiving IV benzodiazepines per CIWA protocol on the floor. Much improved, no need for medication in the last 48 hours.   - continue thiamine, folic acid, MVD   - CM for SBIRT evaluation    Factor V Leiden (HCC)  Assessment & Plan  - h/o DVT/PE and stroke  - Coumadin held/reversed on 5/22 with Vitamin K/K-Centra due to thigh hematoma with active extravasation now s/p IR embolization  - resumed Coumadin 5/24. Hgb stable at 9.1 and INR 1.2 on 5/27. Continue bridging with therapeutic lovenox pending INR 2.0-3.0.              Bowel Regimen: senokot, miralax  VTE Prophylaxis:Sequential compression device (Venodyne)  and Enoxaparin (Lovenox) (therapeutic), Coumadin    Disposition: continue med-surg status, rehab placement pending    Subjective   Chief Complaint: \"I'm feeling well\"    Subjective: Patient is in good spirits. He slept well overnight. No further withdrawal symptoms. He is motivated to go to rehab. Understands we need to wait to hear if [facility] accepts tomorrow. Family is at bedside and updated as well.      Objective   Vitals:   Temp:  [97.7 °F (36.5 °C)-97.8 °F (36.6 °C)] 97.8 °F " "(36.6 °C)  HR:  [61-83] 61  Resp:  [18] 18  BP: (102-142)/(64-86) 129/64    I/O         05/25 0701 05/26 0700 05/26 0701 05/27 0700 05/27 0701 05/28 0700    P.O. 624 402     Total Intake(mL/kg) 624 (6.3) 402 (4.2)     Urine (mL/kg/hr) 1475 (0.6) 1076 (0.5)     Stool  0     Total Output 1475 1076     Net -851 -674            Unmeasured Urine Occurrence 1 x 4 x     Unmeasured Stool Occurrence  2 x              Physical Exam:   GENERAL APPEARANCE: NAD  NEURO: GCS 15,non-focal  HEENT: NCAT  CV: RRR, no MGR  LUNGS: CTA bilaterally  GI: soft,non-tender,non-distended  : voiding  MSK: no edema, contusion or deformity  SKIN: pink, warm, dry    Invasive Devices       Peripheral Intravenous Line  Duration             Peripheral IV 05/26/24 Left Antecubital 1 day                          Lab Results: Results: I have personally reviewed all pertinent laboratory/tests results, BMP/CMP: No results found for: \"SODIUM\", \"K\", \"CL\", \"CO2\", \"ANIONGAP\", \"BUN\", \"CREATININE\", \"GLUCOSE\", \"CALCIUM\", \"AST\", \"ALT\", \"ALKPHOS\", \"PROT\", \"BILITOT\", \"EGFR\", and CBC: No results found for: \"WBC\", \"HGB\", \"HCT\", \"MCV\", \"PLT\", \"ADJUSTEDWBC\", \"RBC\", \"MCH\", \"MCHC\", \"RDW\", \"MPV\", \"NRBC\"  Imaging: I have personally reviewed pertinent reports.     Other Studies: no new       "

## 2024-05-27 NOTE — PHYSICAL THERAPY NOTE
Physical Therapy Treatment Note    Patient's Name: Brian Lal  : 24 1515   PT Last Visit   PT Visit Date 24   Note Type   Note Type Treatment   Pain Assessment   Pain Assessment Tool 0-10   Pain Score No Pain   Restrictions/Precautions   Weight Bearing Precautions Per Order No   General   Chart Reviewed Yes   Additional Pertinent History Pt seen 13:30-13:55 for treatment session. Returned 13:00-13:15 to address dtr's concerns.   Response to Previous Treatment Patient with no complaints from previous session.   Family/Caregiver Present Yes  (dtr for portion of session)   Subjective   Subjective Agreeable to mobilize.   Bed Mobility   Supine to Sit 7  Independent   Sit to Supine 7  Independent   Additional Comments no AD   Transfers   Sit to Stand 7  Independent   Stand to Sit 7  Independent   Toilet transfer 7  Independent   Additional items Standard toilet   Additional Comments no AD; pt's dtr asking about tub t/f - pt does have tub t/f bench - recommend sitting on bench and swinging legs over tub   Ambulation/Elevation   Gait pattern Ataxia   Gait Assistance 5  Supervision   Assistive Device None;Rolling walker   Distance 60' w/ RW + 200' w/o AD   Stair Management Assistance 5  Supervision   Stair Management Technique One rail R;One rail L   Number of Stairs 14   Ambulation/Elevation Additional Comments During second portion of session provided gait training w/o AD on unit and pt completed another 14 stairs w/ 1 handrail   Balance   Static Sitting Good   Dynamic Sitting Good   Static Standing Fair +   Dynamic Standing Fair   Ambulatory Fair   Endurance Deficit   Endurance Deficit No   Activity Tolerance   Activity Tolerance Patient tolerated treatment well   Medical Staff Made Aware CM, Trauma   Exercises   Balance training  retrieving small item from the floor, resisting perturbation, turning 360 deg, standing w/ eyes closed   Assessment   Prognosis Good   Problem List Impaired  balance;Decreased mobility;Decreased strength   Assessment Pt seen for PT treatment session w/ interventions consisting of bed mobility training, t/f training, gait training, stair training, balance training, + pt/family education. Overall pt demonstrated excellent progress w/ physical assistance required for all levels of mobility and increased gait distance to 200' w/o device. Pt scored a 24/28 on the Tinetti, indicating low risk for falls. Pt w/ deficits in the following areas: uses arm to help for t/f, discontinuous steps w/ 360 deg turn, mild path deviation during gait training, and heels apart during gait training. Based on pt's progress, recommend HHPT. Returned at a later time per Trauma AP's request as pt's dtr had concerns re: change in d/c recommendation to home. Pt's dtr concerned that pt's spouse will not be able to assist pt and that pt has had multiple hospital admissions within the past 3 months. All questions answered. Gait + stair training repeated w/ pt w/ pt's dtr present.   Goals   Patient Goals go home   PT Treatment Day 2   Plan   Treatment/Interventions LE strengthening/ROM;Elevations;Therapeutic exercise;Endurance training;Equipment eval/education;Patient/family training;Gait training;Spoke to case management;Spoke to advanced practitioner;Family   Progress Progressing toward goals   PT Frequency 3-5x/wk   Discharge Recommendation   Rehab Resource Intensity Level, PT III (Minimum Resource Intensity)   AM-PAC Basic Mobility Inpatient   Turning in Flat Bed Without Bedrails 4   Lying on Back to Sitting on Edge of Flat Bed Without Bedrails 4   Moving Bed to Chair 4   Standing Up From Chair Using Arms 4   Walk in Room 3   Climb 3-5 Stairs With Railing 3   Basic Mobility Inpatient Raw Score 22   Basic Mobility Standardized Score 47.4   [facility] Highest Level Of Mobility   -HLM Goal 7: Walk 25 feet or more   -HLM Achieved 8: Walk 250 feet ot more   Education   Education Provided Mobility  training   Patient Demonstrates acceptance/verbal understanding   End of Consult   Patient Position at End of Consult Supine;All needs within reach       Ashley Lebron, PT, DPT

## 2024-05-27 NOTE — ASSESSMENT & PLAN NOTE
- s/p IR embolization on 5/22 with no active bleeding seen in the right thigh, but 2 proximal profunda branches supplying the region of the hematoma embolized with Gelfoam.   - warfarin held and reversed with K-Centra and Vitamin K on admission  - Hgb stabilized at 10. He did not require blood transfusion.   - INR on 5/26 is 1.1. Continue therapeutic Lovenox for h/o Factor V Leiden with coumadin resumed, while awaiting therapeutic INR.    - dressing change daily to right thigh abrasion  - right thigh is soft, NVI distally  - no signs of ongoing bleeding

## 2024-05-27 NOTE — ASSESSMENT & PLAN NOTE
- history of chronic alcohol abuse, drinking 10 beers per week on average  - positive CIWA scores this admission. Received phenobarbital in ICU and on 5/25. Receiving IV benzodiazepines per CIWA protocol on the floor. Much improved, no need for medication in the last 48 hours.   - continue thiamine, folic acid, MVD   - CM for SBIRT evaluation

## 2024-05-27 NOTE — ASSESSMENT & PLAN NOTE
- fall at home due to loss of balance  - PT/OT evaluations recommending inpatient rehab.   - CM for dispo planning. Possible placement at [facility] Tuesday if they officially accept.

## 2024-05-28 VITALS
HEART RATE: 79 BPM | OXYGEN SATURATION: 93 % | WEIGHT: 212.74 LBS | DIASTOLIC BLOOD PRESSURE: 98 MMHG | TEMPERATURE: 97.3 F | BODY MASS INDEX: 29.67 KG/M2 | SYSTOLIC BLOOD PRESSURE: 115 MMHG | RESPIRATION RATE: 18 BRPM

## 2024-05-28 LAB
GLUCOSE SERPL-MCNC: 142 MG/DL (ref 65–140)
GLUCOSE SERPL-MCNC: 153 MG/DL (ref 65–140)
INR PPP: 1.18 (ref 0.84–1.19)
PROTHROMBIN TIME: 14.9 SECONDS (ref 11.6–14.5)

## 2024-05-28 PROCEDURE — 82948 REAGENT STRIP/BLOOD GLUCOSE: CPT

## 2024-05-28 PROCEDURE — 85610 PROTHROMBIN TIME: CPT | Performed by: PHYSICIAN ASSISTANT

## 2024-05-28 PROCEDURE — 99238 HOSP IP/OBS DSCHRG MGMT 30/<: CPT | Performed by: PHYSICIAN ASSISTANT

## 2024-05-28 PROCEDURE — 04VK3DZ RESTRICTION OF RIGHT FEMORAL ARTERY WITH INTRALUMINAL DEVICE, PERCUTANEOUS APPROACH: ICD-10-PCS | Performed by: RADIOLOGY

## 2024-05-28 PROCEDURE — 97535 SELF CARE MNGMENT TRAINING: CPT

## 2024-05-28 PROCEDURE — 97530 THERAPEUTIC ACTIVITIES: CPT

## 2024-05-28 PROCEDURE — 97116 GAIT TRAINING THERAPY: CPT

## 2024-05-28 RX ORDER — ACETAMINOPHEN 325 MG/1
650 TABLET ORAL EVERY 6 HOURS PRN
Start: 2024-05-28

## 2024-05-28 RX ORDER — ENOXAPARIN SODIUM 100 MG/ML
90 INJECTION SUBCUTANEOUS EVERY 12 HOURS SCHEDULED
Qty: 20 ML | Refills: 0 | Status: SHIPPED | OUTPATIENT
Start: 2024-05-28 | End: 2024-06-07

## 2024-05-28 RX ORDER — WARFARIN SODIUM 1 MG/1
TABLET ORAL
Start: 2024-05-28

## 2024-05-28 RX ADMIN — Medication 1 TABLET: at 08:11

## 2024-05-28 RX ADMIN — ACETAMINOPHEN 650 MG: 325 TABLET, FILM COATED ORAL at 05:47

## 2024-05-28 RX ADMIN — ENOXAPARIN SODIUM 90 MG: 100 INJECTION SUBCUTANEOUS at 08:11

## 2024-05-28 RX ADMIN — INSULIN LISPRO 1 UNITS: 100 INJECTION, SOLUTION INTRAVENOUS; SUBCUTANEOUS at 12:21

## 2024-05-28 RX ADMIN — FOLIC ACID 1 MG: 1 TABLET ORAL at 08:11

## 2024-05-28 RX ADMIN — CHLORHEXIDINE GLUCONATE 15 ML: 1.2 SOLUTION ORAL at 08:10

## 2024-05-28 RX ADMIN — POLYETHYLENE GLYCOL 3350 17 G: 17 POWDER, FOR SOLUTION ORAL at 08:10

## 2024-05-28 RX ADMIN — FENOFIBRATE 145 MG: 145 TABLET ORAL at 08:11

## 2024-05-28 RX ADMIN — THIAMINE HCL TAB 100 MG 100 MG: 100 TAB at 08:11

## 2024-05-28 RX ADMIN — METOPROLOL TARTRATE 25 MG: 25 TABLET, FILM COATED ORAL at 08:11

## 2024-05-28 RX ADMIN — LEVETIRACETAM 500 MG: 500 TABLET, FILM COATED ORAL at 08:11

## 2024-05-28 RX ADMIN — Medication 100 MCG: at 08:11

## 2024-05-28 RX ADMIN — Medication 2000 UNITS: at 08:11

## 2024-05-28 NOTE — DISCHARGE INSTR - AVS FIRST PAGE
Seek medical attn if you develop increased pain, redness or swelling of the right thigh or if you have increased edema/swelling of the legs otherwise, increased drainage from your wound, fevers/chills/sweats, chest pain, dizziness/lightheadedness, confusion, etc.   Change dressing to the right thigh daily with xeroform, gauze and ACE wrap until healed.   Your coumadin was resumed on 5/24 at home dose of 3.5 mg daily. You were given Vitamin K and K-Centra (PCC) on admission, 5/22 for reversal of coumadin and underwent IR embolization of right thigh hematoma. Therapeutic Lovenox was initiated on 5/26 90 mg twice daily. You are to continue Lovenox shots twice daily until you are therapeutic on your coumadin with INR > 2. Please f/u this week with your family doctor regarding coumadin management. All INR checks should be followed up with your family doctor. Recommend checking INR on by 5/30.

## 2024-05-28 NOTE — DISCHARGE SUMMARY
[facility]  Discharge- Brian Lal 1955, 68 y.o. male MRN: [id number]  [facility] Encounter: [id number]  Primary Care Provider: Douglas Charles Shoenberger, MD   Date and time admitted to hospital: 5/22/2024  7:18 AM    Fall  Assessment & Plan  - fall at home due to loss of balance  - PT/OT evaluations recommending discharge home now.   -  for dispo planning. Discharge home today.  [facility] has been arranged for PT/OT and nursing for wound care.     * Hematoma of right thigh  Assessment & Plan  - s/p IR embolization on 5/22 with no active bleeding seen in the right thigh, but 2 proximal profunda branches supplying the region of the hematoma embolized with Gelfoam.   - warfarin held and reversed with K-Centra and Vitamin K on admission  - Hgb stabilized at 10. He did not require blood transfusion.   - INR on 5/28 is 1.1. Continue therapeutic Lovenox for h/o Factor V Leiden with coumadin resumed, while awaiting therapeutic INR.    - dressing change daily to right thigh abrasion  - right thigh is soft, NVI distally  - no signs of ongoing bleeding    Acute pain due to trauma  Assessment & Plan  - Acute pain secondary to traumatic injuries.  - Continue multimodal analgesic regimen.  - Bowel regimen as long as using opioids.  - Continue to monitor pain and adjust regimen as indicated.      Acute blood loss anemia  Assessment & Plan  - Hgb stable at 10  - no active bleeding clinically  - s/p IR embolization of R thigh hematoma which is the source of bleeding  - no need for blood transfusion  - resumed coumadin 5/24 and therapeutic lovenox pending therapeutic INR    Hypertension  Assessment & Plan  - resume home metoprolol at home  - plan to resume lisinopril 5 mg daily if BP warrants - recommend outpatient f/u with PCP to discuss resumption    History of anxiety  Assessment & Plan  - continue home Xanax 0.5 mg TID prn     History of depressive symptoms  Assessment &  Plan  - family requested psychiatric consultation- consult completed and recommending outpatient AA meetings, no medication additions/adjustments    Alcohol withdrawal (HCC)  Assessment & Plan  - history of chronic alcohol abuse, drinking 10 beers per week on average  - positive CIWA scores this admission. Received phenobarbital in ICU and on 5/25. Receiving IV benzodiazepines per CIWA protocol on the floor. Much improved, no need for medication in the last 72 hours.   - continue thiamine, folic acid, MVD     Chronic alcohol abuse  Assessment & Plan  - history of chronic alcohol abuse, drinking 10 beers per week on average  - positive CIWA scores this admission. Received phenobarbital in ICU and on 5/25. Receiving IV benzodiazepines per CIWA protocol on the floor. Much improved, no need for medication in the last 72 hours.   - continue thiamine, folic acid, MVD     Factor V Leiden (HCC)  Assessment & Plan  - h/o DVT/PE and stroke  - Coumadin held/reversed on 5/22 with Vitamin K/K-Centra due to thigh hematoma with active extravasation now s/p IR embolization  - resumed Coumadin 5/24. Hgb stable at 10.  INR 1.1 on 5/28. Continue bridging with therapeutic lovenox pending INR 2.0-3.0.               Medical Problems       Resolved Problems  Date Reviewed: 5/27/2024   None         Admission Date:   Admission Orders (From admission, onward)       Ordered        05/22/24 1000  Inpatient Admission  Once                            Admitting Diagnosis: Altered mental status [R41.82]  Subcutaneous hematoma [T14.8XXA]    HPI: As documented by Dr. Kelley who evaluated the patient on admission, Mathew Lal is a 68 y.o. male who presents with right thigh hematoma with active extravasation secondary to a fall yesterday.  He states that he was drinking and that he was leaning over by the pool when he fell striking his thigh on the edge of the pool falling into the pool.  He states that he does remember this.  He states that he did  "not strike his head did not lose consciousness.  He originally presented for AMS with systolics in the 70s and got a fluid bolus but then was systolics in the 90s.  He states that he drinks 5+ beers a day.  He says that he was a recovering EtOH user but since his relapse.  He states that he takes Coumadin and Ativan has been taking it as scheduled.  He states that he also has factor V Leiden deficiency.  Patient currently denies any headache, dizziness, chest pain, shortness of breath, N/V/D, abdominal pain, motor or sensory deficits.  Complains only of pain on the right lateral thigh.  He states that he has been getting more black and blue and tense.  He presents with his wife who states that it is looking worse today..  Emergency department given bolus fluids for a blood pressure of 95/52.  As well as Kcentra since his INR was 4.5 with signs of active extravasation. \"    Procedures Performed:   Orders Placed This Encounter   Procedures    Critical Care       Summary of Hospital Course: Patient was admitted to the trauma service due to right thigh hematoma s/p fall on coumadin for Factor V Leiden. His coumadin was held and reversed with K-Central and Vitamin K on admission. Due to active extravasation, he was taken to IR for emobolization and had gelfoam embolization of 2 branches of the proximal profunda artery feeding the hematoma. He had stabilization of his Hemoglobin. On 5/24 his coumadin was resumed. Due to anticipation of delayed response in becoming therapeutic again due to Vitamin K administration, therapeutic lovenox was initiated. He will remain on therapeutic lovenox until he is therapeutic on his coumadin with goal INR 2-3. He is to f/u closely with his PCP who managed his coumadin. He was up with PT/OT who initially recommended rehab. This was during the time when he was experiencing symptoms of alcohol withdrawal and receiving phenobarbital and ativan per [facility] protocol. This resolved and he was then " re-evaluated and recommended for home discharge with [facility]. Family was in agreement. He and family were instructed on daily dressing changes of an abrasion on the R thigh overlying the hematoma and on Lovenox administration as well. He will f/u with trauma in 2 weeks. He will f/u with his PCP in 1-2 weeks as well. He is to have an INR check on 5/29 and as directed thereafter per his PCP who he will f/u with regarding coumadin/lovenox management.     Today he is feeling well. He has no complaints. Family is at bedside. All questions were answered. He will d/c home today.    Exam:  Vitals:    05/28/24 0648   BP: 115/98   Pulse: 79   Resp: 18   Temp: (!) 97.3 °F (36.3 °C)   SpO2: 93%     GEN: NAD  HEENT: NCAT  NEURO: GCS 15,non-focal  CV: RRR, no MGR  PULM: CTA bilaterally  GI: soft,non-tender,non-distended  : voiding  MSK: + R thigh hematoma with overlying abrasion; xeroform, gauze and ACE bandage dressing placed; no edema, contusion or deformity  SKIN: pink, warm, dry      Significant Findings, Care, Treatment and Services Provided:   IR embolization (specify vessel or site)    Result Date: 5/28/2024  Impression: Impression: No active bleeding seen in the right thigh.  2 proximal profunda branches supplying the region of a hematoma embolized with Gelfoam. Workstation performed: [id number]     XR chest 2 views    Result Date: 5/22/2024  Impression: No active pulmonary disease. Workstation performed: [id number]     CT head wo contrast    Result Date: 5/22/2024  Impression: No evidence of acute intracranial process. Chronic microangiopathy. Chronic right PCA territory infarction. Workstation performed: [id number]     CTA lower extremity right w wo contrast    Result Date: 5/22/2024  Impression: 13 cm hematoma extends through the posterolateral proximal right thigh. There are 2 separate foci of active bleeding. The study was marked in EPIC for immediate notification. Workstation performed: [id number]         Complications: none    Condition at Discharge: good         Discharge instructions/Information to patient and family:   See after visit summary for information provided to patient and family.      Provisions for Follow-Up Care:  See after visit summary for information related to follow-up care and any pertinent home health orders.      PCP: Douglas Charles Shoenberger, MD    Disposition: Home    Planned Readmission: No    Discharge Statement   I spent 25 minutes discharging the patient. This time was spent on the day of discharge. I had direct contact with the patient on the day of discharge. Additional documentation is required if more than 30 minutes were spent on discharge.     Discharge Medications:  See after visit summary for reconciled discharge medications provided to patient and family.

## 2024-05-28 NOTE — INCIDENTAL FINDINGS
The following findings require follow up:  Radiographic finding   Finding: Chronic right PCA territory infarction.     Small right fat-containing inguinal hernia.    Follow up required: family doctor   Follow up should be done within 2 week(s)    Patient and family informed of incidental finding and recommended follow up. He verbalized understanding of this.

## 2024-05-28 NOTE — ASSESSMENT & PLAN NOTE
- family requested psychiatric consultation- consult completed and recommending outpatient AA meetings, no medication additions/adjustments

## 2024-05-28 NOTE — ASSESSMENT & PLAN NOTE
- s/p IR embolization on 5/22 with no active bleeding seen in the right thigh, but 2 proximal profunda branches supplying the region of the hematoma embolized with Gelfoam.   - warfarin held and reversed with K-Centra and Vitamin K on admission  - Hgb stabilized at 10. He did not require blood transfusion.   - INR on 5/28 is 1.1. Continue therapeutic Lovenox for h/o Factor V Leiden with coumadin resumed, while awaiting therapeutic INR.    - dressing change daily to right thigh abrasion  - right thigh is soft, NVI distally  - no signs of ongoing bleeding

## 2024-05-28 NOTE — PLAN OF CARE
Problem: PHYSICAL THERAPY ADULT  Goal: Performs mobility at highest level of function for planned discharge setting.  See evaluation for individualized goals.  Description: Treatment/Interventions: OT, Spoke to case management, Spoke to nursing, Gait training, Bed mobility, Patient/family training, Endurance training, LE strengthening/ROM, Functional transfer training          See flowsheet documentation for full assessment, interventions and recommendations.  5/28/2024 1620 by Burt Woodward PTA  Outcome: Progressing  Note: Prognosis: Good  Problem List: Impaired balance, Decreased mobility, Decreased strength  Assessment: pt continues to perform all transfers & gait tasks on levels & steps without need for assist.  he ambulated without use of AD, and despite chronic gait deviations as result of chronic stroke with residual L side wekness, he demosntrated no LOB despite performing dynamic reaching to floor to fix sock, dynamic turns, head movements & carrying object as noted above at various points during session.  Pt performed stair trianing as noted above, with focus primarily on doing so without use of railings that family reports are not available through garage entrance of home.  pt intially did so without UE support, but did appear more stable when doing so with use of SPC for stability while descending.  Upon return to room, discussion held with pt & family regarding his progress & d/c plan.  All parties in agreeement to plan at this time.  SPC given to pt to take home with instuctions to use on steps & in community prn.  Cautioned against ETOH consumption given reason for admit & likely worsening effects given his chronic conditions at this point.  Barriers to Discharge: Inaccessible home environment     Rehab Resource Intensity Level, PT: III (Minimum Resource Intensity)    See flowsheet documentation for full assessment.     5/28/2024 1620 by Burt Woodward PTA  Reactivated

## 2024-05-28 NOTE — ASSESSMENT & PLAN NOTE
- resume home metoprolol at home  - plan to resume lisinopril 5 mg daily if BP warrants - recommend outpatient f/u with PCP to discuss resumption

## 2024-05-28 NOTE — OCCUPATIONAL THERAPY NOTE
Occupational Therapy Treatment Note:      05/28/24 1110   OT Last Visit   OT Visit Date 05/28/24   Note Type   Note Type Treatment   Pain Assessment   Pain Assessment Tool 0-10   Pain Score No Pain   Restrictions/Precautions   Other Precautions Chair Alarm;Bed Alarm;Fall Risk   ADL   LB Dressing Assistance 7  Independent   Toileting Assistance  7  Independent   Transfers   Sit to Stand 7  Independent   Functional Mobility   Functional Mobility 7  Independent   Additional items   (no device. pt was able to carry folding files with handle through halls without lob. when in stance pt was able to bend inorder to fix sock and pant leg)   Cognition   Attention Within functional limits   Orientation Level Oriented X4   Memory Decreased recall of precautions;Decreased short term memory   Following Commands Follows multistep commands with increased time or repetition   Activity Tolerance   Activity Tolerance Patient tolerated treatment well   Assessment   Assessment pt participated in am ot session and was seen focusing on sneaker and sock donning, sock adjustment while in stance, functional mobility while carring a handled box through halls. pt tolerated session well. no lob or difficulty noted during ot session. pt with improvement with r le dressing ie sock this session. pts wife and dtr were present this session and request home ot for tub shower transfers/  per pts dtr he enjoys walks but has a steep hill of a driveway. pt was talkative and motivated this session. pt has achieved goals from ie and plan to d/c acute ot services.   Plan   Treatment Interventions ADL retraining;Functional transfer training;Endurance training;Patient/family training;Equipment evaluation/education;Activityengagement   Goal Expiration Date 06/06/24   OT Treatment Day 2   OT Frequency 2-3x/wk   Discharge Recommendation   Rehab Resource Intensity Level, OT III (Minimum Resource Intensity)   AM-PAC Daily Activity Inpatient   Lower Body  Dressing 4   Bathing 4   Toileting 4   Upper Body Dressing 4   Grooming 4   Eating 4   Daily Activity Raw Score 24   Daily Activity Standardized Score (Calc for Raw Score >=11) 57.54   AM-PAC Applied Cognition Inpatient   Following a Speech/Presentation 3   Understanding Ordinary Conversation 4   Taking Medications 3   Remembering Where Things Are Placed or Put Away 3   Remembering List of 4-5 Errands 3   Taking Care of Complicated Tasks 3   Applied Cognition Raw Score 19   Applied Cognition Standardized Score 39.77     April A Storm

## 2024-05-28 NOTE — ASSESSMENT & PLAN NOTE
- fall at home due to loss of balance  - PT/OT evaluations recommending discharge home now.   - CM for dispo planning. Discharge home today.  [facility] has been arranged for PT/OT and nursing for wound care.

## 2024-05-28 NOTE — ASSESSMENT & PLAN NOTE
- history of chronic alcohol abuse, drinking 10 beers per week on average  - positive CIWA scores this admission. Received phenobarbital in ICU and on 5/25. Receiving IV benzodiazepines per CIWA protocol on the floor. Much improved, no need for medication in the last 72 hours.   - continue thiamine, folic acid, MVD

## 2024-05-28 NOTE — CASE MANAGEMENT
Case Management Discharge Planning Note    Patient name Brian Lal  Location [facility] 602/[facility] 602-01 MRN [id number]  : 1955 Date 2024       Current Admission Date: 2024  Current Admission Diagnosis:Hematoma of right thigh   Patient Active Problem List    Diagnosis Date Noted Date Diagnosed    Acute blood loss anemia 2024     Acute pain due to trauma 2024     Factor V Leiden (HCC) 2024     Fall 2024     Chronic alcohol abuse 2024     Alcohol withdrawal (HCC) 2024     History of depressive symptoms 2024     History of anxiety 2024     Hypertension 2024     Hematoma of right thigh 2024     History of prostate cancer 2021     Renal cyst 2021     Sleep disturbance 2020     Prostate CA (HCC) 2018     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 localized onset, not intractable, without status epilepticus (HCC) 2018     History of cerebral hemorrhage 2018     Elevated PSA 2018       LOS (days): 6  Geometric Mean LOS (GMLOS) (days): 2.5  Days to GMLOS:-3.6     OBJECTIVE:  Risk of Unplanned Readmission Score: 16.84         Current admission status: Inpatient   Preferred Pharmacy:   [facility]/pharmacy #[id number] [place], [place] - [address]  [address]  [facility] [address]  Phone: [phone] Fax: [phone]    [facility] [address] [place] [address] - [address]  [address]  [address]  [facility] [address]  Phone: [phone] Fax: [phone]    Primary Care Provider: Douglas Charles Shoenberger, MD    Primary Insurance: MEDICARE  Secondary Insurance: [facility]    DISCHARGE DETAILS:    Requested Home Health Care         Is the patient interested in HHC at discharge?: Yes  Home Health Discipline requested:: Physical Therapy, Occupational Therapy, Nursing  Home Health Agency Name:: [facility] External Referral Reason  (only applicable if external HHA name selected): Patient has established relationship with provider  Home Health Follow-Up Provider:: PCP  Home Health Services Needed:: Strengthening/Theraputic Exercises to Improve Function, Gait/ADL Training, Evaluate Functional Status and Safety  Homebound Criteria Met:: Requires the Assistance of Another Person for Safe Ambulation or to Leave the Home  Supporting Clincal Findings:: Fatigues Easliy in Short Distances, Limited Endurance    DME Referral Provided  Referral made for DME?: No    Other Referral/Resources/Interventions Provided:  Interventions: [facility]    Pt cleared for a home d/c  Pt's family in agreement but would like A  Pt accepted by [facility]

## 2024-05-28 NOTE — PHYSICAL THERAPY NOTE
Physical Therapy Progress Note     05/28/24 1040   PT Last Visit   PT Visit Date 05/28/24   Note Type   Note Type Treatment   Pain Assessment   Pain Score No Pain   Restrictions/Precautions   Other Precautions Cognitive;Chair Alarm;Bed Alarm;Fall Risk;Pain   Subjective   Subjective Pt encountered seated in recliner, pleasant and motivated to participate in PT.  Reports feeling well overall.  Continues to require instrucitons for pathing in hallway, but executed tasks consistantly without significant delay for processing.   Transfers   Sit to Stand 7  Independent   Stand to Sit 7  Independent   Ambulation/Elevation   Gait pattern Ataxia;Short stride;Decreased L stance;Decreased foot clearance;Improper Weight shift   Gait Assistance 5  Supervision   Additional items Assist x 1   Assistive Device None   Distance 150', then steps, follwed by 120' while carrying box with handle on R side, then remaining 30' back to room normally   Stair Management Assistance 5  Supervision   Additional items Assist x 1   Stair Management Technique One rail L;Reciprocal;No rails;With cane;Alternating pattern   Number of Stairs 13  (x7 steps LHR reciprocal pattern, then x3 steps no rails, followed by x3 steps x2 trials no rails with SPC)   Balance   Static Sitting Good   Static Standing Fair +   Ambulatory Fair   Activity Tolerance   Activity Tolerance Patient tolerated treatment well   Assessment   Prognosis Good   Problem List Impaired balance;Decreased mobility;Decreased strength   Assessment pt continues to perform all transfers & gait tasks on levels & steps without need for assist.  he ambulated without use of AD, and despite chronic gait deviations as result of chronic stroke with residual L side wekness, he demosntrated no LOB despite performing dynamic reaching to floor to fix sock, dynamic turns, head movements & carrying object as noted above at various points during session.  Pt performed stair trianing as noted above, with  focus primarily on doing so without use of railings that family reports are not available through garage entrance of home.  pt intially did so without UE support, but did appear more stable when doing so with use of SPC for stability while descending.  Upon return to room, discussion held with pt & family regarding his progress & d/c plan.  All parties in agreeement to plan at this time.  SPC given to pt to take home with instuctions to use on steps & in community prn.  Cautioned against ETOH consumption given reason for admit & likely worsening effects given his chronic conditions at this point.   Goals   Patient Goals to go home   STG Expiration Date 06/04/24   PT Treatment Day 3   Plan   Treatment/Interventions Functional transfer training;LE strengthening/ROM;Elevations;Therapeutic exercise;Endurance training;Patient/family training;Equipment eval/education;Bed mobility;Gait training   Progress Progressing toward goals   PT Frequency 3-5x/wk   Discharge Recommendation   Rehab Resource Intensity Level, PT III (Minimum Resource Intensity)   AM-PAC Basic Mobility Inpatient   Turning in Flat Bed Without Bedrails 4   Lying on Back to Sitting on Edge of Flat Bed Without Bedrails 4   Moving Bed to Chair 4   Standing Up From Chair Using Arms 4   Walk in Room 3   Climb 3-5 Stairs With Railing 3   Basic Mobility Inpatient Raw Score 22   Basic Mobility Standardized Score 47.4   [facility] Highest Level Of Mobility   -HLM Goal 7: Walk 25 feet or more   JH-HLM Achieved 8: Walk 250 feet ot more       Burt Woodward PTA    [facility] Basic Mobility Raw Score less than 17 suggests pt would benefit from post acute rehab.  Please also refer to the recommendation of the Physical Therapist for safe discharge planning.

## 2024-05-28 NOTE — ASSESSMENT & PLAN NOTE
- h/o DVT/PE and stroke  - Coumadin held/reversed on 5/22 with Vitamin K/K-Centra due to thigh hematoma with active extravasation now s/p IR embolization  - resumed Coumadin 5/24. Hgb stable at 10.  INR 1.1 on 5/28. Continue bridging with therapeutic lovenox pending INR 2.0-3.0.

## 2024-06-06 LAB
BASE EXCESS BLDA CALC-SCNC: -8 MMOL/L (ref -2–3)
CA-I BLD-SCNC: 1.21 MMOL/L (ref 1.12–1.32)
GLUCOSE SERPL-MCNC: 89 MG/DL (ref 65–140)
HCO3 BLDA-SCNC: 17.9 MMOL/L (ref 22–28)
HCT VFR BLD CALC: 25 % (ref 36.5–49.3)
HGB BLDA-MCNC: 8.5 G/DL (ref 12–17)
PCO2 BLD: 19 MMOL/L (ref 21–32)
PCO2 BLD: 34.9 MM HG (ref 36–44)
PH BLD: 7.32 [PH] (ref 7.35–7.45)
PO2 BLD: 130 MM HG (ref 75–129)
POTASSIUM BLD-SCNC: 4.2 MMOL/L (ref 3.5–5.3)
SAO2 % BLD FROM PO2: 99 % (ref 60–85)
SODIUM BLD-SCNC: 131 MMOL/L (ref 136–145)
SPECIMEN SOURCE: ABNORMAL

## 2024-06-07 ENCOUNTER — TELEPHONE (OUTPATIENT)
Dept: NEUROLOGY | Facility: CLINIC | Age: 69
End: 2024-06-07

## 2024-06-07 NOTE — TELEPHONE ENCOUNTER
First Attempt:  LM regarding pt appt  with  on 11/21/2024 that needs to be RS.    P[employer]! message sent.

## 2024-06-10 NOTE — TELEPHONE ENCOUNTER
provider not in office on 11/21- pt needed to be r/s. Jennifer pts wife called accepted next open slot on 12/18 at 2pm w/ Dr Montemayor.

## 2024-08-14 ENCOUNTER — HOSPITAL ENCOUNTER (OUTPATIENT)
Dept: GASTROENTEROLOGY | Facility: HOSPITAL | Age: 69
Setting detail: OUTPATIENT SURGERY
Discharge: HOME | End: 2024-08-14
Attending: INTERNAL MEDICINE
Payer: MEDICARE

## 2024-08-14 ENCOUNTER — ANESTHESIA EVENT (OUTPATIENT)
Dept: GASTROENTEROLOGY | Facility: HOSPITAL | Age: 69
End: 2024-08-14

## 2024-08-14 ENCOUNTER — ANESTHESIA (OUTPATIENT)
Dept: GASTROENTEROLOGY | Facility: HOSPITAL | Age: 69
End: 2024-08-14

## 2024-08-14 VITALS
OXYGEN SATURATION: 95 % | RESPIRATION RATE: 20 BRPM | TEMPERATURE: 98.7 F | DIASTOLIC BLOOD PRESSURE: 67 MMHG | HEART RATE: 61 BPM | SYSTOLIC BLOOD PRESSURE: 99 MMHG

## 2024-08-14 DIAGNOSIS — K62.5 HEMORRHAGE OF ANUS AND RECTUM: ICD-10-CM

## 2024-08-14 RX ORDER — PROPOFOL 10 MG/ML
INJECTION, EMULSION INTRAVENOUS AS NEEDED
Status: DISCONTINUED | OUTPATIENT
Start: 2024-08-14 | End: 2024-08-14

## 2024-08-14 RX ORDER — LIDOCAINE HYDROCHLORIDE 10 MG/ML
INJECTION, SOLUTION EPIDURAL; INFILTRATION; INTRACAUDAL; PERINEURAL AS NEEDED
Status: DISCONTINUED | OUTPATIENT
Start: 2024-08-14 | End: 2024-08-14

## 2024-08-14 RX ORDER — LIDOCAINE HYDROCHLORIDE 10 MG/ML
0.5 INJECTION, SOLUTION EPIDURAL; INFILTRATION; INTRACAUDAL; PERINEURAL ONCE AS NEEDED
Status: CANCELLED | OUTPATIENT
Start: 2024-08-14

## 2024-08-14 RX ORDER — LIDOCAINE HYDROCHLORIDE 10 MG/ML
0.5 INJECTION, SOLUTION EPIDURAL; INFILTRATION; INTRACAUDAL; PERINEURAL ONCE AS NEEDED
Status: DISCONTINUED | OUTPATIENT
Start: 2024-08-14 | End: 2024-08-18 | Stop reason: HOSPADM

## 2024-08-14 RX ORDER — SODIUM CHLORIDE 9 MG/ML
25 INJECTION, SOLUTION INTRAVENOUS CONTINUOUS
Status: CANCELLED | OUTPATIENT
Start: 2024-08-14

## 2024-08-14 RX ORDER — SODIUM CHLORIDE 9 MG/ML
25 INJECTION, SOLUTION INTRAVENOUS CONTINUOUS
Status: DISCONTINUED | OUTPATIENT
Start: 2024-08-14 | End: 2024-08-18 | Stop reason: HOSPADM

## 2024-08-14 RX ORDER — SODIUM CHLORIDE 9 MG/ML
INJECTION, SOLUTION INTRAVENOUS CONTINUOUS PRN
Status: DISCONTINUED | OUTPATIENT
Start: 2024-08-14 | End: 2024-08-14

## 2024-08-14 RX ADMIN — LIDOCAINE HYDROCHLORIDE 50 MG: 10 INJECTION, SOLUTION EPIDURAL; INFILTRATION; INTRACAUDAL; PERINEURAL at 12:58

## 2024-08-14 RX ADMIN — PROPOFOL 80 MCG/KG/MIN: 10 INJECTION, EMULSION INTRAVENOUS at 13:03

## 2024-08-14 RX ADMIN — SODIUM CHLORIDE: 0.9 INJECTION, SOLUTION INTRAVENOUS at 12:55

## 2024-08-14 RX ADMIN — PROPOFOL 100 MG: 10 INJECTION, EMULSION INTRAVENOUS at 12:58

## 2024-08-14 RX ADMIN — PROPOFOL 50 MG: 10 INJECTION, EMULSION INTRAVENOUS at 13:00

## 2024-08-14 NOTE — ANESTHESIA PREPROCEDURE EVALUATION
Procedure:  COLONOSCOPY    Relevant Problems   CARDIO   (+) Hypertension      /RENAL   (+) Prostate CA (HCC)   (+) Renal cyst      NEURO/PSYCH   (+) 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 localized onset, not intractable, without status epilepticus (HCC)      Blood   (+) Factor V Leiden (HCC)      Denies recent fever, cough or other symptom of upper respiratory tract infection.    Confirmed NPO appropriate    Physical Exam    Airway    Mallampati score: III  TM Distance: >3 FB  Neck ROM: full     Dental   Comment: Poor remaining dentition     Cardiovascular      Pulmonary      Other Findings        2016 TTE:  Normal left ventricular chamber size. Normal left ventricular systolic     function. No obvious regional wall motion abnormalities but poor     endocardial definition limits evaluation. Normal left ventricular wall     thickness. Estimated left ventricular ejection fraction is 55-60%.      Normal right ventricular size and function.      Normal left atrial size.      The aortic valve is not well visualized. No significant aortic stenosis or     regurgitation.     Structurally normal mitral valve without significant stenosis or     regurgitation.     Trace tricuspid regurgitation.       Anesthesia Plan  ASA Score- 3     Anesthesia Type- IV sedation with anesthesia with ASA Monitors.         Additional Monitors:     Airway Plan:            Plan Factors-Exercise tolerance (METS): >4 METS.    Chart reviewed.   Existing labs reviewed.                   Induction- intravenous.    Postoperative Plan-         Informed Consent- Anesthetic plan and risks discussed with patient.

## 2024-08-14 NOTE — ANESTHESIA POSTPROCEDURE EVALUATION
Post-Op Assessment Note    CV Status:  Stable    Pain management: satisfactory to patient       Mental Status:  Alert and awake   Hydration Status:  Euvolemic   PONV Controlled:  Controlled   Airway Patency:  Patent     Post Op Vitals Reviewed: Yes    No anethesia notable event occurred.    Staff: CRNA               BP 92/62 (08/14/24 1315)    Temp 98.7 °F (37.1 °C) (08/14/24 1315)    Pulse 55 (08/14/24 1315)   Resp 16 (08/14/24 1315)    SpO2 95 % (08/14/24 1315)

## 2024-08-14 NOTE — H&P
History and Physical -  [facility]  Brian Lal 68 y.o. male MRN: [id number]                  HPI: Brian Lal is a 68 y.o. year old male who presents for colonoscopy.  Indications for the procedure: Positive Cologuard.      REVIEW OF SYSTEMS: Per the HPI, and otherwise unremarkable.    Historical Information   Past Medical History:   Diagnosis Date    Arrhythmia     Diabetes (HCC)     Diabetes mellitus (HCC)     DVT (deep venous thrombosis) (HCC)     Dyslipidemia     Factor V deficiency (HCC)     Hypertension     Kidney disease     Lobar cerebral hemorrhage (HCC)     Multiple pulmonary emboli (HCC)     Seizures (HCC) 2017    Stroke (HCC) 2016     Past Surgical History:   Procedure Laterality Date    IR EMBOLIZATION (SPECIFY VESSEL OR SITE)  2024    IVC FILTER INSERTION      IVC umbrella    PROSTATE BIOPSY Bilateral 2018    Dr. Riley      Social History   Social History     Substance and Sexual Activity   Alcohol Use Not Currently    Comment: not since May     Social History     Substance and Sexual Activity   Drug Use Not Currently    Types: Marijuana    Comment: medical marijuana card - does not use (gummy)     Social History     Tobacco Use   Smoking Status Former    Current packs/day: 0.00    Types: Cigarettes    Quit date: 1989    Years since quittin.6   Smokeless Tobacco Never     Family History   Problem Relation Age of Onset    Kidney disease Father     Hypertension Father     Diabetes Father     Heart attack Father     Stroke Father     Hypertension Mother     Heart attack Mother     Hypertension Brother        Meds/Allergies       Current Outpatient Medications:     atorvastatin (LIPITOR) 10 mg tablet    Cholecalciferol (VITAMIN D3) 1000 units CAPS    fenofibrate 160 MG tablet    levETIRAcetam (KEPPRA) 1000 MG tablet    LORazepam (ATIVAN) 0.5 mg tablet    thiamine (VITAMIN B1) 100 mg tablet    acetaminophen (TYLENOL) 325 mg tablet    aspirin 81 MG  tablet    Cyanocobalamin (VITAMIN B-12 PO)    enoxaparin (LOVENOX) 100 mg/mL    fenofibrate (TRICOR) 54 MG tablet    glipiZIDE (GLUCOTROL XL) 10 mg 24 hr tablet    Jardiance 25 MG TABS    metoprolol succinate (TOPROL-XL) 50 mg 24 hr tablet    warfarin (COUMADIN) 1 mg tablet    Current Facility-Administered Medications:     lidocaine (PF) (XYLOCAINE-MPF) 1 % injection 0.5 mL, 0.5 mL, Infiltration, Once PRN    sodium chloride 0.9 % infusion, 25 mL/hr, Intravenous, Continuous    Allergies   Allergen Reactions    Penicillins Hives     hives, swelling- from youth         Objective     /83   Pulse 71   Temp 98.7 °F (37.1 °C) (Tympanic)   Resp 20   SpO2 96%       PHYSICAL EXAM    Gen: NAD  Head: NCAT  CV: RRR  CHEST: Clear  ABD: soft, NT/ND  EXT: no edema      ASSESSMENT/PLAN:  This is a 68 y.o. year old male here for colonoscopy, and he is stable and optimized for his procedure.

## 2024-12-18 ENCOUNTER — OFFICE VISIT (OUTPATIENT)
Dept: NEUROLOGY | Facility: CLINIC | Age: 69
End: 2024-12-18
Payer: MEDICARE

## 2024-12-18 VITALS
SYSTOLIC BLOOD PRESSURE: 108 MMHG | OXYGEN SATURATION: 95 % | DIASTOLIC BLOOD PRESSURE: 70 MMHG | BODY MASS INDEX: 29.51 KG/M2 | WEIGHT: 210.8 LBS | HEART RATE: 75 BPM | HEIGHT: 71 IN | TEMPERATURE: 97.5 F

## 2024-12-18 DIAGNOSIS — G40.009 LOCALIZATION-RELATED (FOCAL) (PARTIAL) IDIOPATHIC EPILEPSY AND EPILEPTIC SYNDROMES WITH SEIZURES OF LOCALIZED ONSET, NOT INTRACTABLE, WITHOUT STATUS EPILEPTICUS (HCC): Primary | ICD-10-CM

## 2024-12-18 PROCEDURE — 99213 OFFICE O/P EST LOW 20 MIN: CPT | Performed by: PSYCHIATRY & NEUROLOGY

## 2024-12-18 PROCEDURE — G2211 COMPLEX E/M VISIT ADD ON: HCPCS | Performed by: PSYCHIATRY & NEUROLOGY

## 2024-12-18 RX ORDER — ACETAMINOPHEN 325 MG/1
650 TABLET ORAL AS NEEDED
COMMUNITY

## 2024-12-18 RX ORDER — LEVETIRACETAM 1000 MG/1
TABLET ORAL
Qty: 135 TABLET | Refills: 3 | Status: SHIPPED | OUTPATIENT
Start: 2024-12-18

## 2024-12-18 RX ORDER — WARFARIN SODIUM 3 MG/1
TABLET ORAL
COMMUNITY

## 2024-12-18 RX ORDER — METOPROLOL TARTRATE 25 MG/1
25 TABLET, FILM COATED ORAL DAILY
COMMUNITY
Start: 2024-01-27

## 2024-12-19 NOTE — ASSESSMENT & PLAN NOTE
Overall, he continues to be seizure-free on levetiracetam monotherapy which she is tolerating well without any significant side effects.  Given his prior intracerebral hemorrhage and residual neurologic deficits, I will be important for him to continue to stay on levetiracetam going forward.    He will continue left unchanged.  If he would have additional seizures, his dose could be increased

## 2025-06-27 NOTE — TELEPHONE ENCOUNTER
I called and left a voicemail message about patients upcoming appointment with Patrice Weinsetin on 5/25/21 @ 12:45 pm  I requested a call back to our office if the patient has any issues or concerns or cannot keep this appointment 
DISPLAY PLAN FREE TEXT